# Patient Record
Sex: MALE | Employment: UNEMPLOYED | URBAN - METROPOLITAN AREA
[De-identification: names, ages, dates, MRNs, and addresses within clinical notes are randomized per-mention and may not be internally consistent; named-entity substitution may affect disease eponyms.]

---

## 2017-10-16 ENCOUNTER — TRANSFERRED RECORDS (OUTPATIENT)
Dept: HEALTH INFORMATION MANAGEMENT | Facility: CLINIC | Age: 62
End: 2017-10-16

## 2017-10-23 ENCOUNTER — TELEPHONE (OUTPATIENT)
Dept: ONCOLOGY | Facility: CLINIC | Age: 62
End: 2017-10-23

## 2017-10-23 NOTE — TELEPHONE ENCOUNTER
Called patient regarding his appointment with Dr. Bueno tomorrow. Patient stated that he thought our clinic was in [place]. Patient was given the directions and phone number of our clinic. Patient thought he also had an appointment in [place], he will call clinic if he is going to cancel. Awilda Mendoza RN

## 2017-10-24 ENCOUNTER — ONCOLOGY VISIT (OUTPATIENT)
Dept: ONCOLOGY | Facility: CLINIC | Age: 62
End: 2017-10-24
Attending: INTERNAL MEDICINE
Payer: COMMERCIAL

## 2017-10-24 ENCOUNTER — HOSPITAL ENCOUNTER (OUTPATIENT)
Facility: CLINIC | Age: 62
Setting detail: SPECIMEN
Discharge: HOME | End: 2017-10-24
Attending: INTERNAL MEDICINE | Admitting: INTERNAL MEDICINE
Payer: COMMERCIAL

## 2017-10-24 VITALS
WEIGHT: 229 LBS | OXYGEN SATURATION: 98 % | SYSTOLIC BLOOD PRESSURE: 121 MMHG | HEART RATE: 73 BPM | TEMPERATURE: 98.8 F | DIASTOLIC BLOOD PRESSURE: 78 MMHG

## 2017-10-24 DIAGNOSIS — D50.8 OTHER IRON DEFICIENCY ANEMIA: Primary | ICD-10-CM

## 2017-10-24 PROBLEM — D50.9 ANEMIA, IRON DEFICIENCY: Status: ACTIVE | Noted: 2017-10-24

## 2017-10-24 LAB
ALBUMIN SERPL-MCNC: 3.4 G/DL (ref 3.4–5)
ALP SERPL-CCNC: 88 U/L (ref 40–150)
ALT SERPL W P-5'-P-CCNC: 71 U/L (ref 0–70)
ANION GAP SERPL CALCULATED.3IONS-SCNC: 11 MMOL/L (ref 3–14)
AST SERPL W P-5'-P-CCNC: 240 U/L (ref 0–45)
BASOPHILS # BLD AUTO: 0.1 10E9/L (ref 0–0.2)
BASOPHILS NFR BLD AUTO: 1.3 %
BILIRUB SERPL-MCNC: 0.3 MG/DL (ref 0.2–1.3)
BUN SERPL-MCNC: 6 MG/DL (ref 7–30)
CALCIUM SERPL-MCNC: 8.4 MG/DL (ref 8.5–10.1)
CHLORIDE SERPL-SCNC: 102 MMOL/L (ref 94–109)
CO2 SERPL-SCNC: 22 MMOL/L (ref 20–32)
CREAT SERPL-MCNC: 0.72 MG/DL (ref 0.66–1.25)
DIFFERENTIAL METHOD BLD: ABNORMAL
EOSINOPHIL # BLD AUTO: 0.1 10E9/L (ref 0–0.7)
EOSINOPHIL NFR BLD AUTO: 1.9 %
ERYTHROCYTE [DISTWIDTH] IN BLOOD BY AUTOMATED COUNT: 19.5 % (ref 10–15)
FERRITIN SERPL-MCNC: 56 NG/ML (ref 26–388)
FOLATE SERPL-MCNC: 16.2 NG/ML
GFR SERPL CREATININE-BSD FRML MDRD: >90 ML/MIN/1.7M2
GLUCOSE SERPL-MCNC: 91 MG/DL (ref 70–99)
HCT VFR BLD AUTO: 29.9 % (ref 40–53)
HGB BLD-MCNC: 9.4 G/DL (ref 13.3–17.7)
IMM GRANULOCYTES # BLD: 0 10E9/L (ref 0–0.4)
IMM GRANULOCYTES NFR BLD: 0.3 %
IRON SATN MFR SERPL: 4 % (ref 15–46)
IRON SERPL-MCNC: 15 UG/DL (ref 35–180)
LYMPHOCYTES # BLD AUTO: 2.4 10E9/L (ref 0.8–5.3)
LYMPHOCYTES NFR BLD AUTO: 37.8 %
MCH RBC QN AUTO: 23.4 PG (ref 26.5–33)
MCHC RBC AUTO-ENTMCNC: 31.4 G/DL (ref 31.5–36.5)
MCV RBC AUTO: 74 FL (ref 78–100)
MONOCYTES # BLD AUTO: 0.4 10E9/L (ref 0–1.3)
MONOCYTES NFR BLD AUTO: 6.5 %
NEUTROPHILS # BLD AUTO: 3.3 10E9/L (ref 1.6–8.3)
NEUTROPHILS NFR BLD AUTO: 52.2 %
NRBC # BLD AUTO: 0 10*3/UL
NRBC BLD AUTO-RTO: 0 /100
PLATELET # BLD AUTO: 267 10E9/L (ref 150–450)
POTASSIUM SERPL-SCNC: 4.2 MMOL/L (ref 3.4–5.3)
PROT SERPL-MCNC: 7.9 G/DL (ref 6.8–8.8)
RBC # BLD AUTO: 4.02 10E12/L (ref 4.4–5.9)
RETICS # AUTO: 46.6 10E9/L (ref 25–95)
RETICS/RBC NFR AUTO: 1.2 % (ref 0.5–2)
SODIUM SERPL-SCNC: 135 MMOL/L (ref 133–144)
TIBC SERPL-MCNC: 413 UG/DL (ref 240–430)
VIT B12 SERPL-MCNC: 602 PG/ML (ref 193–986)
WBC # BLD AUTO: 6.4 10E9/L (ref 4–11)

## 2017-10-24 PROCEDURE — 82607 VITAMIN B-12: CPT | Performed by: INTERNAL MEDICINE

## 2017-10-24 PROCEDURE — 84165 PROTEIN E-PHORESIS SERUM: CPT | Performed by: INTERNAL MEDICINE

## 2017-10-24 PROCEDURE — 85060 BLOOD SMEAR INTERPRETATION: CPT | Performed by: INTERNAL MEDICINE

## 2017-10-24 PROCEDURE — 82746 ASSAY OF FOLIC ACID SERUM: CPT | Performed by: INTERNAL MEDICINE

## 2017-10-24 PROCEDURE — 82728 ASSAY OF FERRITIN: CPT | Performed by: INTERNAL MEDICINE

## 2017-10-24 PROCEDURE — 85045 AUTOMATED RETICULOCYTE COUNT: CPT | Performed by: INTERNAL MEDICINE

## 2017-10-24 PROCEDURE — 80053 COMPREHEN METABOLIC PANEL: CPT | Performed by: INTERNAL MEDICINE

## 2017-10-24 PROCEDURE — 36415 COLL VENOUS BLD VENIPUNCTURE: CPT

## 2017-10-24 PROCEDURE — 99211 OFF/OP EST MAY X REQ PHY/QHP: CPT

## 2017-10-24 PROCEDURE — 83540 ASSAY OF IRON: CPT | Performed by: INTERNAL MEDICINE

## 2017-10-24 PROCEDURE — 40000847 ZZHCL STATISTIC MORPHOLOGY W/INTERP HISTOLOGY TC 85060: Performed by: INTERNAL MEDICINE

## 2017-10-24 PROCEDURE — 00000402 ZZHCL STATISTIC TOTAL PROTEIN: Performed by: INTERNAL MEDICINE

## 2017-10-24 PROCEDURE — 99203 OFFICE O/P NEW LOW 30 MIN: CPT | Performed by: INTERNAL MEDICINE

## 2017-10-24 PROCEDURE — 83550 IRON BINDING TEST: CPT | Performed by: INTERNAL MEDICINE

## 2017-10-24 PROCEDURE — 85025 COMPLETE CBC W/AUTO DIFF WBC: CPT | Performed by: INTERNAL MEDICINE

## 2017-10-24 RX ORDER — CITALOPRAM HYDROBROMIDE 20 MG/1
20 TABLET ORAL DAILY
Status: ON HOLD | COMMUNITY
End: 2019-06-28

## 2017-10-24 ASSESSMENT — PAIN SCALES - GENERAL: PAINLEVEL: NO PAIN (0)

## 2017-10-24 NOTE — PATIENT INSTRUCTIONS
-labs today  -schedule colonoscopy at [facility]  will call you. [phone]  -take oral iron pills 325 mg twice a day  -return to clinic in 2 months with labs a few days prior - at the [facility] Scheduled/diana Begum printed & given to patient/diana

## 2017-10-24 NOTE — PROGRESS NOTES
[facility]    Hematology/Oncology New Patient Note      Today's Date: 10/24/17    Reason for Consult: iron-deficiency anemia      HISTORY OF PRESENT ILLNESS: Pramod Harris is a 62 year old male who presents with anemia.  On 10/3/17, hemoglobin was 10.4.  On 10/16/17, hemoglobin was 9.3.  WBC and platelets are normal.  Iron saturation was 6%.  TIBC was 414.  Ferritin level is 21.  He currently denies any bleeding symptoms or dizziness, lightheadedness, or fatigue.  He says that he feels fine.  He denies pain.  He notes history of bleeding ulcer in June 2017, and underwent EGD at the time.  He does not take NSAID's.  He has never had a colonoscopy.  He started taking oral iron a couple of days ago, but only takes it every other day.          REVIEW OF SYSTEMS:   14 point ROS was reviewed and is negative other than as noted above in HPI.       HOME MEDICATIONS:  No current outpatient prescriptions on file.         ALLERGIES:  No Known Allergies      PAST MEDICAL HISTORY:  GERD  Anemia  HLD  Impaired fasting glucose  Osteoarthritis      PAST SURGICAL HISTORY:  Tonsillectomy/adenoidectomy      SOCIAL HISTORY:  Social History     Social History     Marital status:      Spouse name: N/A     Number of children: N/A     Years of education: N/A     Occupational History     Not on file.     Social History Main Topics     Smoking status: Not on file     Smokeless tobacco: Not on file     Alcohol use Not on file     Drug use: Not on file     Sexual activity: Not on file     Other Topics Concern     Not on file     Social History Narrative   He lives in [place], [place].  He is retired.  He has 4 children.  He does not smoke.  He drinks 3-4 times a week, but drinks a 6-pack of beer each time.        FAMILY HISTORY:  He denies family history of cancer.  Mother had seizure disorder.        PHYSICAL EXAM:  Vital signs:  /78 (BP Location: Right arm, Patient Position: Chair, Cuff Size: Adult  Large)  Pulse 73  Temp 98.8  F (37.1  C) (Oral)  Wt 103.9 kg (229 lb)  SpO2 98%   GENERAL/CONSTITUTIONAL: No acute distress. Accompanied by girlfriend.  EYES: No scleral icterus.  RESPIRATORY: Clear to auscultation bilaterally. No crackles or wheezing.   CARDIOVASCULAR: Regular rate and rhythm without murmurs, gallops, or rubs.  GASTROINTESTINAL: No hepatosplenomegaly, masses, or tenderness. The patient has normal bowel sounds. No guarding.  No distention.  MUSCULOSKELETAL: Warm and well-perfused.  NEUROLOGIC: Alert, oriented, answers questions appropriately.      LABS:  Outside labs reviewed.      ASSESSMENT/PLAN:  Pramod Harris is a 62 year old male with:    1) Iron-deficiency anemia: He has history of bleeding ulcer, but currently has no symptoms.  He had an EGD done in June 2017.  He has never had a colonoscopy, so I advised him to have that done.  He will take oral iron.  If he gets intolerable side effects or if not effective, he can try IV iron.  He agrees to try oral iron first.    -he know not to take NSAID's  -labs today: CBC, CMP, vitamin B12, folate, reticulocyte count, ferritin, iron panel, peripheral smear, SPEP  -start oral iron ferrous sulfate 325 mg po BID  -referral for colonoscopy made  -RTC in 2 months with repeat labs - he prefers the [place] location      I spent a total of 30 minutes with the patient, with over >50% of the time in counseling and/or coordination of care.       Mariama Bueno MD  Hematology/Oncology  [facility]

## 2017-10-24 NOTE — MR AVS SNAPSHOT
After Visit Summary   10/24/2017    Pramod Harris    MRN: [id number]           Patient Information     Date Of Birth          1955        Visit Information        Provider Department      10/24/2017 2:30 PM Mariama Bueno MD [facility]        Today's Diagnoses     Other iron deficiency anemia    -  1      Care Instructions    -labs today  -schedule colonoscopy at [facility]  will call you. [phone]  -take oral iron pills 325 mg twice a day  -return to clinic in 2 months with labs a few days prior - at the [facility]          Follow-ups after your visit        Additional Services     GASTROENTEROLOGY ADULT REF PROCEDURE ONLY       Last Lab Result: No results found for: CR  There is no height or weight on file to calculate BMI.     Needed:  No  Language:  English    Patient will be contacted to schedule procedure.     Please be aware that coverage of these services is subject to the terms and limitations of your health insurance plan.  Call member services at your health plan with any benefit or coverage questions.  Any procedures must be performed at a [place] facility OR coordinated by your clinic's referral office.    Please bring the following with you to your appointment:    (1) Any X-Rays, CTs or MRIs which have been performed.  Contact the facility where they were done to arrange for  prior to your scheduled appointment.    (2) List of current medications   (3) This referral request   (4) Any documents/labs given to you for this referral                  Your next 10 appointments already scheduled     Dec 19, 2017  1:30 PM CST   LAB with  [facility]   [facility]    [address] [place] [address], [address]  [facility] [address] [phone]           Patient must bring picture ID. Patient should be prepared to give a urine specimen  Please do not eat 10-12 hours before your  appointment if you are coming in fasting for labs on lipids, cholesterol, or glucose (sugar). Pregnant women should follow their Care Team instructions. Water with medications is okay. Do not drink coffee or other fluids. If you have concerns about taking  your medications, please ask at office or if scheduling via BlazeMeter, send a message by clicking on Secure Messaging, Message Your Care Team.            Dec 21, 2017  2:15 PM CST   Return Visit with Mariama Bueno MD   [facility]    [facility]  [address]  [address]  [facility] [id number] [phone]              Future tests that were ordered for you today     Open Future Orders        Priority Expected Expires Ordered    CBC with platelets differential Routine 12/24/2017 10/24/2018 10/24/2017    Ferritin Routine 12/24/2017 10/24/2018 10/24/2017    Iron and iron binding capacity Routine 12/24/2017 10/24/2018 10/24/2017            Who to contact     If you have questions or need follow up information about today's clinic visit or your schedule please contact [facility] directly at [phone].  Normal or non-critical lab and imaging results will be communicated to you by ReferralCandyhart, letter or phone within 4 business days after the clinic has received the results. If you do not hear from us within 7 days, please contact the clinic through ReferralCandyhart or phone. If you have a critical or abnormal lab result, we will notify you by phone as soon as possible.  Submit refill requests through BlazeMeter or call your pharmacy and they will forward the refill request to us. Please allow 3 business days for your refill to be completed.          Additional Information About Your Visit        BlazeMeter Information     BlazeMeter lets you send messages to your doctor, view your test results, renew your prescriptions, schedule appointments and more. To sign up, go to  [email or web address] . Click on \"Log in\" on the left side of the screen, which will take you to the Welcome page. Then click on \"Sign up Now\" on the right side of the page.     You will be asked to enter the access code listed below, as well as some personal information. Please follow the directions to create your username and password.     Your access code is: [email or web address]  Expires: 2018  3:20 PM     Your access code will  in 90 days. If you need help or a new code, please call your [place] clinic or [phone].        Care EveryWhere ID     This is your Care EveryWhere ID. This could be used by other organizations to access your [facility] medical records  [id number]        Your Vitals Were     Pulse Temperature Pulse Oximetry             73 98.8  F (37.1  C) (Oral) 98%          Blood Pressure from Last 3 Encounters:   10/24/17 121/78    Weight from Last 3 Encounters:   10/24/17 103.9 kg (229 lb)              We Performed the Following     Blood Morphology Pathologist Review     CBC with platelets differential     Comprehensive metabolic panel     Ferritin     Folate     GASTROENTEROLOGY ADULT REF PROCEDURE ONLY     Iron and iron binding capacity     Protein electrophoresis     Reticulocyte count     Vitamin B12        Primary Care Provider Office Phone # Fax #    Pierce Govea [phone]       [facility] [address] [facility] [address]        Equal Access to Services     THANIA MEZA : Hadii maxx coleo Sokerri, waaxda luqadaha, qaybta kaalmada adecedricda, nunu vaca . So [facility] [phone].    ATENCIÓN: Si habla español, tiene a martini disposición servicios gratuitos de asistencia lingüística. Michael al [phone].    We comply with applicable federal civil rights laws and [place] laws. We do not discriminate on the basis of race, color, national origin, age, disability, sex, sexual orientation, or gender " identity.            Thank you!     Thank you for choosing [facility]  for your care. Our goal is always to provide you with excellent care. Hearing back from our patients is one way we can continue to improve our services. Please take a few minutes to complete the written survey that you may receive in the mail after your visit with us. Thank you!             Your Updated Medication List - Protect others around you: Learn how to safely use, store and throw away your medicines at www.disposemymeds.org.          This list is accurate as of: 10/24/17  3:22 PM.  Always use your most recent med list.                   Brand Name Dispense Instructions for use Diagnosis    celeXA 20 MG tablet   Generic drug:  citalopram      Take 20 mg by mouth daily    Other iron deficiency anemia       metFORMIN 500 MG tablet    GLUCOPHAGE     Take 500 mg by mouth 2 times daily (with meals)    Other iron deficiency anemia       OMEPRAZOLE PO       Other iron deficiency anemia

## 2017-10-24 NOTE — LETTER
10/24/2017         RE: Pramod Harris  [address]  [facility][1] [address]        Dear Colleague,    Thank you for referring your patient, Pramod Harris, to the [facility]. Please see a copy of my visit note below.    [facility]    Hematology/Oncology New Patient Note      Today's Date: 10/24/17    Reason for Consult: iron-deficiency anemia      HISTORY OF PRESENT ILLNESS: Pramod Harris is a 62 year old male who presents with anemia.  On 10/3/17, hemoglobin was 10.4.  On 10/16/17, hemoglobin was 9.3.  WBC and platelets are normal.  Iron saturation was 6%.  TIBC was 414.  Ferritin level is 21.  He currently denies any bleeding symptoms or dizziness, lightheadedness, or fatigue.  He says that he feels fine.  He denies pain.  He notes history of bleeding ulcer in June 2017, and underwent EGD at the time.  He does not take NSAID's.  He has never had a colonoscopy.  He started taking oral iron a couple of days ago, but only takes it every other day.          REVIEW OF SYSTEMS:   14 point ROS was reviewed and is negative other than as noted above in HPI.       HOME MEDICATIONS:  No current outpatient prescriptions on file.         ALLERGIES:  No Known Allergies      PAST MEDICAL HISTORY:  GERD  Anemia  HLD  Impaired fasting glucose  Osteoarthritis      PAST SURGICAL HISTORY:  Tonsillectomy/adenoidectomy      SOCIAL HISTORY:  Social History     Social History     Marital status:      Spouse name: N/A     Number of children: N/A     Years of education: N/A     Occupational History     Not on file.     Social History Main Topics     Smoking status: Not on file     Smokeless tobacco: Not on file     Alcohol use Not on file     Drug use: Not on file     Sexual activity: Not on file     Other Topics Concern     Not on file     Social History Narrative   He lives in [place], [place].  He is retired.  He has 4 children.  He does not smoke.  He drinks 3-4 times  a week, but drinks a 6-pack of beer each time.        FAMILY HISTORY:  He denies family history of cancer.  Mother had seizure disorder.        PHYSICAL EXAM:  Vital signs:  /78 (BP Location: Right arm, Patient Position: Chair, Cuff Size: Adult Large)  Pulse 73  Temp 98.8  F (37.1  C) (Oral)  Wt 103.9 kg (229 lb)  SpO2 98%   GENERAL/CONSTITUTIONAL: No acute distress. Accompanied by girlfriend.  EYES: No scleral icterus.  RESPIRATORY: Clear to auscultation bilaterally. No crackles or wheezing.   CARDIOVASCULAR: Regular rate and rhythm without murmurs, gallops, or rubs.  GASTROINTESTINAL: No hepatosplenomegaly, masses, or tenderness. The patient has normal bowel sounds. No guarding.  No distention.  MUSCULOSKELETAL: Warm and well-perfused.  NEUROLOGIC: Alert, oriented, answers questions appropriately.      LABS:  Outside labs reviewed.      ASSESSMENT/PLAN:  Pramod Harris is a 62 year old male with:    1) Iron-deficiency anemia: He has history of bleeding ulcer, but currently has no symptoms.  He had an EGD done in June 2017.  He has never had a colonoscopy, so I advised him to have that done.  He will take oral iron.  If he gets intolerable side effects or if not effective, he can try IV iron.  He agrees to try oral iron first.    -he know not to take NSAID's  -labs today: CBC, CMP, vitamin B12, folate, reticulocyte count, ferritin, iron panel, peripheral smear, SPEP  -start oral iron ferrous sulfate 325 mg po BID  -referral for colonoscopy made  -RTC in 2 months with repeat labs - he prefers the [place] location      I spent a total of 30 minutes with the patient, with over >50% of the time in counseling and/or coordination of care.       Mariama Bueno MD  Hematology/Oncology  [facility]      Oncology Rooming Note    October 24, 2017 2:35 PM   Pramod Harris is a 62 year old male who presents for:    Chief Complaint   Patient presents with     Oncology Clinic  Visit     Initial Vitals: /78 (BP Location: Right arm, Patient Position: Chair, Cuff Size: Adult Large)  Pulse 73  Temp 98.8  F (37.1  C) (Oral)  Wt 103.9 kg (229 lb)  SpO2 98% There is no height or weight on file to calculate BMI. There is no height or weight on file to calculate BSA.  No Pain (0) Comment: Data Unavailable   No LMP for male patient.  Allergies reviewed: Yes  Medications reviewed: Yes    Medications: Medication refills not needed today.  Pharmacy name entered into EPIC: Data Unavailable    Clinical concerns: None     5 minutes for nursing intake (face to face time)     Abida Giron CMA          Medical Assistant Note:  Pramod Guzmankedar Harris presents today for labs.    Patient seen by provider today: Yes: .   present during visit today: Not Applicable.    Concerns: No Concerns.    Procedure:  Lab draw site: RAC, Needle type: BF, Gauge: 21.    Post Assessment:  Labs drawn without difficulty: Yes.    Discharge Plan:  Departure Mode: Ambulatory.    Face to Face Time: 5 minutes.    Abida Giron MA          DISCHARGE PLAN:  Next appointments: See patient instruction section. Pt instructions reviewed with pt. Labs drawn by Abida. [facility]. Pt brought to lobby to schedule lab and follow up at [facility].   Departure Mode: Ambulatory  Accompanied by: self  5 minutes for nursing discharge (face to face time)   Abida Giron CMA                  Again, thank you for allowing me to participate in the care of your patient.        Sincerely,        Mariama Bueno MD

## 2017-10-24 NOTE — PROGRESS NOTES
Oncology Rooming Note    October 24, 2017 2:35 PM   Pramod Harris is a 62 year old male who presents for:    Chief Complaint   Patient presents with     Oncology Clinic Visit     Initial Vitals: /78 (BP Location: Right arm, Patient Position: Chair, Cuff Size: Adult Large)  Pulse 73  Temp 98.8  F (37.1  C) (Oral)  Wt 103.9 kg (229 lb)  SpO2 98% There is no height or weight on file to calculate BMI. There is no height or weight on file to calculate BSA.  No Pain (0) Comment: Data Unavailable   No LMP for male patient.  Allergies reviewed: Yes  Medications reviewed: Yes    Medications: Medication refills not needed today.  Pharmacy name entered into EPIC: Data Unavailable    Clinical concerns: None     5 minutes for nursing intake (face to face time)     Abida Giron CMA          Medical Assistant Note:  Pramod Harris presents today for labs.    Patient seen by provider today: Yes: .   present during visit today: Not Applicable.    Concerns: No Concerns.    Procedure:  Lab draw site: RAC, Needle type: BF, Gauge: 21.    Post Assessment:  Labs drawn without difficulty: Yes.    Discharge Plan:  Departure Mode: Ambulatory.    Face to Face Time: 5 minutes.    Abida Giron MA          DISCHARGE PLAN:  Next appointments: See patient instruction section. Pt instructions reviewed with pt. Labs drawn by Abida. Pt notified that Naveen López will call him to set up colonoscopy. Pt brought to [facility] to schedule lab and follow up at [facility].   Departure Mode: Ambulatory  Accompanied by: self  5 minutes for nursing discharge (face to face time)   Abida Giron CMA

## 2017-10-25 LAB
ALBUMIN SERPL ELPH-MCNC: 4.1 G/DL (ref 3.7–5.1)
ALPHA1 GLOB SERPL ELPH-MCNC: 0.3 G/DL (ref 0.2–0.4)
ALPHA2 GLOB SERPL ELPH-MCNC: 0.7 G/DL (ref 0.5–0.9)
B-GLOBULIN SERPL ELPH-MCNC: 1 G/DL (ref 0.6–1)
COPATH REPORT: NORMAL
GAMMA GLOB SERPL ELPH-MCNC: 1.8 G/DL (ref 0.7–1.6)
M PROTEIN SERPL ELPH-MCNC: 0 G/DL
PROT PATTERN SERPL ELPH-IMP: ABNORMAL

## 2017-11-06 ENCOUNTER — HOSPITAL ENCOUNTER (OUTPATIENT)
Facility: CLINIC | Age: 62
Discharge: HOME | End: 2017-11-06
Attending: INTERNAL MEDICINE | Admitting: INTERNAL MEDICINE
Payer: COMMERCIAL

## 2017-11-06 VITALS
WEIGHT: 229 LBS | SYSTOLIC BLOOD PRESSURE: 121 MMHG | OXYGEN SATURATION: 98 % | HEART RATE: 81 BPM | BODY MASS INDEX: 36.8 KG/M2 | HEIGHT: 66 IN | RESPIRATION RATE: 16 BRPM | DIASTOLIC BLOOD PRESSURE: 89 MMHG

## 2017-11-06 LAB — COLONOSCOPY: NORMAL

## 2017-11-06 PROCEDURE — 88305 TISSUE EXAM BY PATHOLOGIST: CPT | Mod: 26 | Performed by: INTERNAL MEDICINE

## 2017-11-06 PROCEDURE — G0500 MOD SEDAT ENDO SERVICE >5YRS: HCPCS | Performed by: INTERNAL MEDICINE

## 2017-11-06 PROCEDURE — 45385 COLONOSCOPY W/LESION REMOVAL: CPT | Performed by: INTERNAL MEDICINE

## 2017-11-06 PROCEDURE — 25000128 H RX IP 250 OP 636: Performed by: INTERNAL MEDICINE

## 2017-11-06 PROCEDURE — 88305 TISSUE EXAM BY PATHOLOGIST: CPT | Performed by: INTERNAL MEDICINE

## 2017-11-06 RX ORDER — ONDANSETRON 2 MG/ML
4 INJECTION INTRAMUSCULAR; INTRAVENOUS EVERY 6 HOURS PRN
Status: DISCONTINUED | OUTPATIENT
Start: 2017-11-06 | End: 2017-11-06 | Stop reason: HOSPADM

## 2017-11-06 RX ORDER — LIDOCAINE 40 MG/G
CREAM TOPICAL
Status: DISCONTINUED | OUTPATIENT
Start: 2017-11-06 | End: 2017-11-06 | Stop reason: HOSPADM

## 2017-11-06 RX ORDER — ONDANSETRON 4 MG/1
4 TABLET, ORALLY DISINTEGRATING ORAL EVERY 6 HOURS PRN
Status: DISCONTINUED | OUTPATIENT
Start: 2017-11-06 | End: 2017-11-06 | Stop reason: HOSPADM

## 2017-11-06 RX ORDER — FLUMAZENIL 0.1 MG/ML
0.2 INJECTION, SOLUTION INTRAVENOUS
Status: DISCONTINUED | OUTPATIENT
Start: 2017-11-06 | End: 2017-11-06 | Stop reason: HOSPADM

## 2017-11-06 RX ORDER — ONDANSETRON 2 MG/ML
4 INJECTION INTRAMUSCULAR; INTRAVENOUS
Status: DISCONTINUED | OUTPATIENT
Start: 2017-11-06 | End: 2017-11-06 | Stop reason: HOSPADM

## 2017-11-06 RX ORDER — FENTANYL CITRATE 50 UG/ML
INJECTION, SOLUTION INTRAMUSCULAR; INTRAVENOUS PRN
Status: DISCONTINUED | OUTPATIENT
Start: 2017-11-06 | End: 2017-11-06 | Stop reason: HOSPADM

## 2017-11-06 RX ORDER — NALOXONE HYDROCHLORIDE 0.4 MG/ML
.1-.4 INJECTION, SOLUTION INTRAMUSCULAR; INTRAVENOUS; SUBCUTANEOUS
Status: DISCONTINUED | OUTPATIENT
Start: 2017-11-06 | End: 2017-11-06 | Stop reason: HOSPADM

## 2017-11-06 NOTE — H&P
Pre-Endoscopy History and Physical     Pramod Harris MRN# [id number]   YOB: 1955 Age: 62 year old     Date of Procedure: 11/6/2017  Primary care provider: Pierce Govea  Type of Endoscopy: Colonoscopy with possible biopsy, possible polypectomy  Reason for Procedure: anemia  Type of Anesthesia Anticipated: Conscious Sedation    HPI:    Pramod is a 62 year old male who will be undergoing the above procedure.      A history and physical has been performed. The patient's medications and allergies have been reviewed. The risks and benefits of the procedure and the sedation options and risks were discussed with the patient.  All questions were answered and informed consent was obtained.      He denies a personal or family history of anesthesia complications or bleeding disorders.     Patient Active Problem List   Diagnosis     Anemia, iron deficiency        History reviewed. No pertinent past medical history.     Past Surgical History:   Procedure Laterality Date     COLONOSCOPY  11/06/2017    Dr. Mick TAO     ENT SURGERY      T&A as a child     ESOPHAGOSCOPY, GASTROSCOPY, DUODENOSCOPY (EGD), COMBINED  06/2017    [facility]       Social History   Substance Use Topics     Smoking status: Never Smoker     Smokeless tobacco: Never Used     Alcohol use Yes      Comment: 4-5 beers/ 2-3 times per week       Family History   Problem Relation Age of Onset     Colon Cancer No family hx of        Prior to Admission medications    Medication Sig Start Date End Date Taking? Authorizing Provider   Ferrous Sulfate (IRON SUPPLEMENT PO)    Yes Reported, Patient   metFORMIN (GLUCOPHAGE) 500 MG tablet Take 500 mg by mouth 2 times daily (with meals)   Yes Reported, Patient   citalopram (CELEXA) 20 MG tablet Take 20 mg by mouth daily   Yes Reported, Patient   OMEPRAZOLE PO    Yes Reported, Patient       No Known Allergies     REVIEW OF SYSTEMS:   5 point ROS negative except as noted above in HPI, including  "Gen., Resp., CV, GI &  system review.    PHYSICAL EXAM:   Ht 1.676 m (5' 6\")  Wt 103.9 kg (229 lb)  BMI 36.96 kg/m2 Estimated body mass index is 36.96 kg/(m^2) as calculated from the following:    Height as of this encounter: 1.676 m (5' 6\").    Weight as of this encounter: 103.9 kg (229 lb).   GENERAL APPEARANCE: alert, and oriented  MENTAL STATUS: alert  AIRWAY EXAM: Mallampatti Class I (visualization of the soft palate, fauces, uvula, anterior and posterior pillars)  RESP: lungs clear to auscultation - no rales, rhonchi or wheezes  CV: regular rates and rhythm  DIAGNOSTICS:    Not indicated    IMPRESSION   ASA Class 2 - Mild systemic disease    PLAN:   Plan for Colonoscopy with possible biopsy, possible polypectomy. We discussed the risks, benefits and alternatives and the patient wished to proceed.    The above has been forwarded to the consulting provider.      Signed Electronically by: Austyn Barton  November 6, 2017          "

## 2017-11-06 NOTE — DISCHARGE INSTRUCTIONS
Understanding Colon and Rectal Polyps     The colon has a smooth lining composed of millions of cells.     The colon (also called the large intestine) is a muscular tube that forms the last part of the digestive tract. It absorbs water and stores food waste. The colon is about 4 to 6 feet long. The rectum is the last 6 inches of the colon. The colon and rectum have a smooth lining composed of millions of cells. Changes in these cells can lead to growths in the colon that can become cancerous and should be removed.     When the Colon Lining Changes  Changes that occur in the cells that line the colon or rectum can lead to growths called polyps. Over a period of years, polyps can turn cancerous. Removing polyps early may prevent cancer from ever forming.      Polyps  Polyps are fleshy clumps of tissue that form on the lining of the colon or rectum. Small polyps are usually benign (not cancerous). However, over time, cells in a polyp can change and become cancerous. The larger a polyp grows, the more likely this is to happen. Also, certain types of polyps known as adenomatous polyps are considered premalignant. This means that they will almost always become cancerous if they re not removed.          Cancer  Almost all colorectal cancers start when polyp cells begin growing abnormally. As a cancerous tumor grows, it may involve more and more of the colon or rectum. In time, cancer can also grow beyond the colon or rectum and spread to nearby organs or to glands called lymph nodes. The cells can also travel to other parts of the body. This is known as metastasis. The earlier a cancerous tumor is removed, the better the chance of preventing its spread.        0653-3067 [facility], [address] [place] [address], [place] [address]. All rights reserved. This information is not intended as a substitute for professional medical care. Always follow your healthcare professional's instructions.

## 2017-11-06 NOTE — IP AVS SNAPSHOT
MRN:[id number]                      After Visit Summary   11/6/2017    Pramod Harris    MRN: [id number]           Thank you!     Thank you for choosing [facility] for your care. Our goal is always to provide you with excellent care. Hearing back from our patients is one way we can continue to improve our services. Please take a few minutes to complete the written survey that you may receive in the mail after you visit. If you would like to speak to someone directly about your visit please contact Patient Relations at [phone]. Thank you!          Patient Information     Date Of Birth          1955        About your hospital stay     You were admitted on:  November 6, 2017 You last received care in the:  [facility]    You were discharged on:  November 6, 2017       Who to Call     For medical emergencies, please call 911.  For non-urgent questions about your medical care, please call your primary care provider or clinic, [phone]  For questions related to your surgery, please call your surgery clinic        Attending Provider     Provider Specialty    Austyn Barton MD Gastroenterology       Primary Care Provider Office Phone # Fax #    Pierce Govea [phone]      Your next 10 appointments already scheduled     Dec 19, 2017  1:30 PM CST   LAB with  LAB   [facility]    [address] [place] [address], [address]  [facility] [address] [phone]           Please do not eat 10-12 hours before your appointment if you are coming in fasting for labs on lipids, cholesterol, or glucose (sugar). This does not apply to pregnant women. Water, hot tea and black coffee (with nothing added) are okay. Do not drink other fluids, diet soda or chew gum.            Dec 21, 2017  2:15 PM CST   Return Visit with Mariama Bueno MD   [facility] ([facility]  [facility])    [facility]  [address] [place] [address]   [phone]              Further instructions from your care team         Understanding Colon and Rectal Polyps     The colon has a smooth lining composed of millions of cells.     The colon (also called the large intestine) is a muscular tube that forms the last part of the digestive tract. It absorbs water and stores food waste. The colon is about 4 to 6 feet long. The rectum is the last 6 inches of the colon. The colon and rectum have a smooth lining composed of millions of cells. Changes in these cells can lead to growths in the colon that can become cancerous and should be removed.     When the Colon Lining Changes  Changes that occur in the cells that line the colon or rectum can lead to growths called polyps. Over a period of years, polyps can turn cancerous. Removing polyps early may prevent cancer from ever forming.      Polyps  Polyps are fleshy clumps of tissue that form on the lining of the colon or rectum. Small polyps are usually benign (not cancerous). However, over time, cells in a polyp can change and become cancerous. The larger a polyp grows, the more likely this is to happen. Also, certain types of polyps known as adenomatous polyps are considered premalignant. This means that they will almost always become cancerous if they re not removed.          Cancer  Almost all colorectal cancers start when polyp cells begin growing abnormally. As a cancerous tumor grows, it may involve more and more of the colon or rectum. In time, cancer can also grow beyond the colon or rectum and spread to nearby organs or to glands called lymph nodes. The cells can also travel to other parts of the body. This is known as metastasis. The earlier a cancerous tumor is removed, the better the chance of preventing its spread.        4841-8655 Gillian Henderson, [address] [place] [address], [place] [address]. All rights reserved.  "This information is not intended as a substitute for professional medical care. Always follow your healthcare professional's instructions.    Pending Results     No orders found from 2017 to 2017.            Admission Information     Date & Time Provider Department Dept. Phone    2017 Austyn Barton MD [facility] [phone]      Your Vitals Were     Blood Pressure Pulse Respirations Height Weight Pulse Oximetry    115/83 81 15 1.676 m (5' 6\") 103.9 kg (229 lb) 95%    BMI (Body Mass Index)                   36.96 kg/m2           My[employer]enda Information     [employer] lets you send messages to your doctor, view your test results, renew your prescriptions, schedule appointments and more. To sign up, go to [email or web address] . Click on \"Log in\" on the left side of the screen, which will take you to the Welcome page. Then click on \"Sign up Now\" on the right side of the page.     You will be asked to enter the access code listed below, as well as some personal information. Please follow the directions to create your username and password.     Your access code is: [email or web address]  Expires: 2018  2:20 PM     Your access code will  in 90 days. If you need help or a new code, please call your [place] clinic or [phone].        Care EveryWhere ID     This is your Care EveryWhere ID. This could be used by other organizations to access your [place] medical records  [id number]        Equal Access to Services     [facility] : Hadii aad ku hadasho Soomaali, waaxda luqadaha, qaybta kaalmada adeegyada, nunu vaca . So [facility] [phone].    ATENCIÓN: Si habla español, tiene a martini disposición servicios gratuitos de asistencia lingüística. Llame al [phone].    We comply with applicable federal civil rights laws and [place] laws. We do not discriminate on the basis of race, color, national origin, age, disability, sex, sexual orientation, or gender " identity.               Review of your medicines      CONTINUE these medicines which have NOT CHANGED        Dose / Directions    celeXA 20 MG tablet   Used for:  Other iron deficiency anemia   Generic drug:  citalopram        Dose:  20 mg   Take 20 mg by mouth daily   Refills:  0       IRON SUPPLEMENT PO        Refills:  0       metFORMIN 500 MG tablet   Commonly known as:  GLUCOPHAGE   Used for:  Other iron deficiency anemia        Dose:  500 mg   Take 500 mg by mouth 2 times daily (with meals)   Refills:  0       OMEPRAZOLE PO   Used for:  Other iron deficiency anemia        Refills:  0                Protect others around you: Learn how to safely use, store and throw away your medicines at www.disposemymeds.org.             Medication List: This is a list of all your medications and when to take them. Check marks below indicate your daily home schedule. Keep this list as a reference.      Medications           Morning Afternoon Evening Bedtime As Needed    celeXA 20 MG tablet   Take 20 mg by mouth daily   Generic drug:  citalopram                                IRON SUPPLEMENT PO                                metFORMIN 500 MG tablet   Commonly known as:  GLUCOPHAGE   Take 500 mg by mouth 2 times daily (with meals)                                OMEPRAZOLE PO

## 2017-11-06 NOTE — LETTER
October 31, 2017      Pramodrosy Guzmankedar Harris  [address] [facility] [address]        Thank you for choosing [facility]. You are scheduled for the following service.     Date:  11/17/2017 Friday             Procedure:  COLONOSCOPY  Doctor:        Dr. Austyn Barton   Arrival Time:  7:30 AM  *check in at Emergency/Endoscopy desk*  Procedure Time:  8:00 AM    Location:   [facility]        Endoscopy Department, First Floor (Enter through ER Doors) *        [address] [place], [place] [address]      [phone] or [phone] () to reschedule      MIRALAX -GATORADE  PREP  Colonoscopy is the most accurate test to detect colon polyps and colon cancer; and the only test where polyps can be removed. During this procedure, a doctor examines the lining of your large intestine and rectum through a flexible tube.       Transportation  Arrange for a ride for the day of your procedure with a responsible adult.  A taxi ride is not an option unless you are accompanied by a responsible adult. If you fail to arrange transportation with a responsible adult, your procedure will be cancelled and rescheduled.    Purchase the  following supplies at your local pharmacy:  - 2 (two) bisacodyl tablets: each tablet contains 5 mg.  (Dulcolax  laxative NOT Dulcolax  stool softener)   - 1 (one) 8.3 oz bottle of Polyethylene Glycol (PEG) 3350 Powder   (MiraLAX , Smooth LAX , ClearLAX  or equivalent)  - 64 oz Gatorade    Regular Gatorade, Gatorade G2 , Powerade , Powerade Zero  or Pedialyte  is acceptable. Red colored flavors are not allowed; all other colors (yellow, green, orange, purple and blue) are okay. It is also okay to buy two 2.12 oz packets of powdered Gatorade that can be mixed with water to a total volume of 64 oz of liquid.  - 1 (one) 10 oz bottle of Magnesium Citrate (Red colored flavors are not allowed)  It is also okay for you to use a 0.5 oz package of  powdered magnesium citrate (17 g) mixed with 10 oz of water.    PREPARATION FOR COLONOSCOPY    7 days before:    Discontinue fiber supplements and medications containing iron. This includes Metamucil  and Fibercon ; and multivitamins with iron.  3 days before:    Begin a low-fiber diet. A low-fiber diet helps making the cleanout more effective.     Examples of a low-fiber diet include (but are not limited to): white bread, white rice, pasta, crackers, fish, chicken, eggs, ground beef, creamy peanut butter, cooked/steamed/boiled vegetables, canned fruit, bananas, melons, milk, plain yogurt cheese, salad dressing and other condiments.     The following are not allowed on a low-fiber diet: seeds, nuts, popcorn, bran, whole wheat, corn, quinoa, raw fruits and vegetables, berries and dried fruit, beans and lentils.    For additional details on low-fiber diet, please refer to the table on the last page.  2 days before:    Continue the low-fiber diet.     Drink at least 8 glasses of water throughout the day.     Stop eating solid foods at 11:45 pm.  1 day before:    In the morning: begin a clear liquid diet (liquids you can see through).     Examples of a clear liquid diet include: water, clear broth or bouillon, Gatorade, Pedialyte or Powerade, carbonated and non-carbonated soft drinks (Sprite , 7-Up , ginger ale), strained fruit juices without pulp (apple, white grape, white cranberry), Jell-O  and popsicles.     The following are not allowed on a clear liquid diet: red liquids, alcoholic beverages, coffee, dairy products (milk, creamer, and yogurt), protein shakes, creamy broths, juice with pulp and chewing tobacco.    At noon: take 2 (two) bisacodyl tablets     At 4 (and no later than 6pm): start drinking the Miralax-Gatorade preparation (8.3 oz of Miralax mixed with 64 oz of Gatorade in a large pitcher). Drink 1(one) 8 oz glass every 15 minutes thereafter, until the mixture is gone.    COLON CLEANSING TIPS: drink  adequate amounts of fluids before and after your colon cleansing to prevent dehydration. Stay near a toilet because you will have diarrhea. Even if you are sitting on the toilet, continue to drink the cleansing solution every 15 minutes. If you feel nauseous or vomit, rinse your mouth with water, take a 15 to 30-minute-break and then continue drinking the solution. You will be uncomfortable until the stool has flushed from your colon (in about 2 to 4 hours). You may feel chilled.              Day of your procedure  You may take all of your morning medications including blood pressure medications, blood thinners (if you have not been instructed to stop these by our office), methadone, anti-seizure medications with sips of water 3 hours prior to your procedure or earlier. Do not take insulin or vitamins prior to your procedure. Continue the clear liquid diet.   4 hours prior: drink 10 oz of magnesium citrate. It may be easier to drink it with a straw.    STOP consuming all liquids after that.     Do not take anything by mouth during this time.     Allow extra time to travel to your procedure as you may need to stop and use a restroom along the way.  You are ready for the procedure, if you followed all instructions and your stool is no longer formed, but clear or yellow liquid. If you are unsure whether your colon is clean, please call our office at [phone] before you leave for your appointment.  Bring the following to your procedure:  - Insurance Card/Photo ID.   - List of current medications including over-the-counter medications and supplements.   - Your rescue inhaler if you currently use one to control asthma.      Canceling or rescheduling your appointment:   If you must cancel or reschedule your appointment, please call [phone] as soon as possible.      COLONOSCOPY PRE-PROCEDURE CHECKLIST  If you have diabetes, ask your regular doctor for diet and medication restrictions.  If you take an anticoagulant  or anti-platelet medication (such as Coumadin , Lovenox , Pradaxa , Xarelto , Eliquis , etc.), please call your primary doctor for advice on holding this medication.  If you take aspirin you may continue to do so.  If you are or may be pregnant, please discuss the risks and benefits of this procedure with your doctor.          What happens during a colonoscopy?    Plan to spend up to two hours, starting at registration time, at the endoscopy center the day of your procedure. The colonoscopy takes an average of 15 to 30 minutes. Recovery time is about 30 minutes.    Before the exam:    You will change into a gown.    Your medical history and medication list will be reviewed with you, unless that has been done over the phone prior to the procedure.     A nurse will insert an intravenous (IV) line into your hand or arm.    The doctor will meet with you and will give you a consent form to sign.    During the exam:     Medicine will be given through the IV line to help you relax.     Your heart rate and oxygen levels will be monitored. If your blood pressure is low, you may be given fluids through the IV line.     The doctor will insert a flexible hollow tube, called a colonoscope, into your rectum. The scope will be advanced slowly through the large intestine (colon).    You may have a feeling of fullness or pressure.     If an abnormal tissue or a polyp is found, the doctor may remove it through the endoscope for closer examination, or biopsy. Tissue removal is painless    After the exam:           Any tissue samples removed during the exam will be sent to a lab for evaluation. It may take 5-7 working days for you to be notified of the results.     A nurse will provide you with complete discharge instructions before you leave the endoscopy center. Be sure to ask the nurse for specific instructions if you take blood thinners such as Aspirin, Coumadin or Plavix.     The doctor will prepare a full report for you and for  the physician who referred you for the procedure.     Your doctor will talk with you about the initial results of your exam.      Medication given during the exam will prohibit you from driving for the rest of the day.     Following the exam, you may resume your normal diet. Your first meal should be light, no greasy foods. Avoid alcohol until the next day.     You may resume your regular activities the day after the procedure.     LOW-FIBER DIET    Foods RECOMMENDED Foods to AVOID   Breads, Cereal, Rice and Pasta:   White bread, rolls, biscuits, croissant and france toast.   Waffles, Slovenian toast and pancakes.   White rice, noodles, pasta, macaroni and peeled cooked potatoes.   Plain crackers and saltines.   Cooked cereals: farina, cream of rice.   Cold cereals: Puffed Rice , Rice Krispies , Corn Flakes  and Special K    Breads, Cereal, Rice and Pasta:   Breads or rolls with nuts, seeds or fruit.   Whole wheat, pumpernickel, rye breads and cornbread.   Potatoes with skin, brown or wild rice, and kasha (buckwheat).     Vegetables:   Tender cooked and canned vegetables without seeds: carrots, asparagus tips, green or wax beans, pumpkin, spinach, lima beans. Vegetables:   Raw or steamed vegetables.   Vegetables with seeds.   Sauerkraut.   Winter squash, peas, broccoli, Brussel sprouts, cabbage, onions, cauliflower, baked beans, peas and corn.   Fruits:   Strained fruit juice.   Canned fruit, except pineapple.   Ripe bananas and melon. Fruits:   Prunes and prune juice.   Raw fruits.   Dried fruits: figs, dates and raisins.   Milk/Dairy:   Milk: plain or flavored.   Yogurt, custard and ice cream.   Cheese and cottage cheese Milk/Dairy:     Meat and other proteins:   ground, well-cooked tender beef, lamb, ham, veal, pork, fish, poultry and organ meats.   Eggs.   Peanut butter without nuts. Meat and other proteins:   Tough, fibrous meats with gristle.   Dry beans, peas and lentils.   Peanut butter with nuts.   Tofu.    Fats, Snack, Sweets, Condiments and Beverages:   Margarine, butter, oils, mayonnaise, sour cream and salad dressing, plain gravy.   Sugar, hard candy, clear jelly, honey and syrup.   Spices, cooked herbs, bouillon, broth and soups made with allowed vegetable, ketchup and mustard.   Coffee, tea and carbonated drinks.   Plain cakes, cookies and pretzels.   Gelatin, plain puddings, custard, ice cream, sherbet and popsicles. Fats, Snack, Sweets, Condiments and Beverages:   Nuts, seeds and coconut.   Jam, marmalade and preserves.   Pickles, olives, relish and horseradish.   All desserts containing nuts, seeds, dried fruit and coconut; or made from whole grains or bran.   Candy made with nuts or seeds.   Popcorn.                     DIRECTIONS TO THE ENDOSCOPY DEPARTMENT     From the north ([facility])  Take [address] South, exit on [address] [address]. Get into the left hand pema, turn left (east), go one-half mile to [address] and turn left. Go north to the first stoplight, take a right on [address] and follow it to the Emergency entrance.    From the south ([facility])  Take [address]N to the [address] split and exit on [address] [address]. On [address] [address], turn left (west) to [address]. Turn right (north) on [address]. Go north to the first stoplight, take a right on [address] and follow it to the Emergency entrance.    From the east via 35E ([facility])  Take [address] south to [address] [address] exit. Turn right on [address] . Go west to [address]. Turn right (north) on [address]. Go to the first stoplight, take a right and follow on [address] to the Emergency entrance.    From the east via [address] ([facility])  Take [address] to [address]. Turn left (south) on [address] to [address]. Turn left (east) on [address] and follow it to the Emergency entrance.    From the west via [address] ([place], [place])  Take [address] east  to [address]. Turn right (south) on [address] to [address]. Turn left (east) on [address] and follow it to the Emergency entrance.

## 2017-11-06 NOTE — IP AVS SNAPSHOT
[facility]    [address]    [facility] [address]    Phone:  [phone]    Fax:  [phone]                                       After Visit Summary   11/6/2017    Pramod Harris    MRN: [id number]           After Visit Summary Signature Page     I have received my discharge instructions, and my questions have been answered. I have discussed any challenges I see with this plan with the nurse or doctor.    ..........................................................................................................................................  Patient/Patient Representative Signature      ..........................................................................................................................................  Patient Representative Print Name and Relationship to Patient    ..................................................               ................................................  Date                                            Time    ..........................................................................................................................................  Reviewed by Signature/Title    ...................................................              ..............................................  Date                                                            Time

## 2017-11-07 LAB — COPATH REPORT: NORMAL

## 2017-12-12 DIAGNOSIS — D50.8 OTHER IRON DEFICIENCY ANEMIA: ICD-10-CM

## 2017-12-12 LAB
ANISOCYTOSIS BLD QL SMEAR: SLIGHT
BASOPHILS # BLD AUTO: 0.1 10E9/L (ref 0–0.2)
BASOPHILS NFR BLD AUTO: 1 %
DIFFERENTIAL METHOD BLD: ABNORMAL
EOSINOPHIL # BLD AUTO: 0.2 10E9/L (ref 0–0.7)
EOSINOPHIL NFR BLD AUTO: 4 %
ERYTHROCYTE [DISTWIDTH] IN BLOOD BY AUTOMATED COUNT: 24.2 % (ref 10–15)
FERRITIN SERPL-MCNC: 73 NG/ML (ref 26–388)
HCT VFR BLD AUTO: 39.5 % (ref 40–53)
HGB BLD-MCNC: 12.9 G/DL (ref 13.3–17.7)
IRON SATN MFR SERPL: 73 % (ref 15–46)
IRON SERPL-MCNC: 247 UG/DL (ref 35–180)
LYMPHOCYTES # BLD AUTO: 3.1 10E9/L (ref 0.8–5.3)
LYMPHOCYTES NFR BLD AUTO: 55 %
MCH RBC QN AUTO: 26.7 PG (ref 26.5–33)
MCHC RBC AUTO-ENTMCNC: 32.7 G/DL (ref 31.5–36.5)
MCV RBC AUTO: 82 FL (ref 78–100)
MONOCYTES # BLD AUTO: 0.4 10E9/L (ref 0–1.3)
MONOCYTES NFR BLD AUTO: 8 %
NEUTROPHILS # BLD AUTO: 1.8 10E9/L (ref 1.6–8.3)
NEUTROPHILS NFR BLD AUTO: 32 %
PLATELET # BLD AUTO: 266 10E9/L (ref 150–450)
RBC # BLD AUTO: 4.83 10E12/L (ref 4.4–5.9)
TIBC SERPL-MCNC: 339 UG/DL (ref 240–430)
WBC # BLD AUTO: 5.6 10E9/L (ref 4–11)

## 2017-12-12 PROCEDURE — 85025 COMPLETE CBC W/AUTO DIFF WBC: CPT | Performed by: INTERNAL MEDICINE

## 2017-12-12 PROCEDURE — 83540 ASSAY OF IRON: CPT | Performed by: INTERNAL MEDICINE

## 2017-12-12 PROCEDURE — 36415 COLL VENOUS BLD VENIPUNCTURE: CPT | Performed by: INTERNAL MEDICINE

## 2017-12-12 PROCEDURE — 83550 IRON BINDING TEST: CPT | Performed by: INTERNAL MEDICINE

## 2017-12-12 PROCEDURE — 82728 ASSAY OF FERRITIN: CPT | Performed by: INTERNAL MEDICINE

## 2017-12-21 ENCOUNTER — ONCOLOGY VISIT (OUTPATIENT)
Dept: ONCOLOGY | Facility: CLINIC | Age: 62
End: 2017-12-21
Attending: INTERNAL MEDICINE
Payer: COMMERCIAL

## 2017-12-21 VITALS
RESPIRATION RATE: 16 BRPM | HEIGHT: 66 IN | TEMPERATURE: 98.4 F | BODY MASS INDEX: 35.55 KG/M2 | OXYGEN SATURATION: 94 % | DIASTOLIC BLOOD PRESSURE: 85 MMHG | WEIGHT: 221.2 LBS | SYSTOLIC BLOOD PRESSURE: 138 MMHG | HEART RATE: 85 BPM

## 2017-12-21 DIAGNOSIS — D50.8 OTHER IRON DEFICIENCY ANEMIA: Primary | ICD-10-CM

## 2017-12-21 PROCEDURE — 99211 OFF/OP EST MAY X REQ PHY/QHP: CPT

## 2017-12-21 PROCEDURE — 99213 OFFICE O/P EST LOW 20 MIN: CPT | Performed by: INTERNAL MEDICINE

## 2017-12-21 NOTE — PROGRESS NOTES
[facility]    Hematology/Oncology Established Patient Note      Today's Date: 12/21/17    Reason for Follow-up: iron-deficiency anemia      HISTORY OF PRESENT ILLNESS: Pramod Harris is a 62 year old male who presents with anemia.  On 10/3/17, hemoglobin was 10.4.  On 10/16/17, hemoglobin was 9.3.  WBC and platelets are normal.  Iron saturation was 6%.  TIBC was 414.  Ferritin level is 21.  He currently denies any bleeding symptoms or dizziness, lightheadedness, or fatigue.  He says that he feels fine.  He denies pain.  He notes history of bleeding ulcer in June 2017, and underwent EGD at the time.  He does not take NSAID's.      INTERIM HISTORY: Pramod comes in for follow-up today.  He says that he feels well, better than the last visit.  He takes oral iron at least once a day now.  Sometimes he takes it twice a day if he remembers, but often forgets to take the second dose.  He says that it gives him constipation sometimes.  He denies bleeding symptoms.        REVIEW OF SYSTEMS:   14 point ROS was reviewed and is negative other than as noted above in HPI.       HOME MEDICATIONS:  Current Outpatient Prescriptions   Medication Sig Dispense Refill     Ferrous Sulfate (IRON SUPPLEMENT PO)        metFORMIN (GLUCOPHAGE) 500 MG tablet Take 500 mg by mouth 2 times daily (with meals)       citalopram (CELEXA) 20 MG tablet Take 20 mg by mouth daily       OMEPRAZOLE PO            ALLERGIES:  No Known Allergies      PAST MEDICAL HISTORY:  GERD  Anemia  HLD  Impaired fasting glucose  Osteoarthritis      PAST SURGICAL HISTORY:  Tonsillectomy/adenoidectomy      SOCIAL HISTORY:  Social History     Social History     Marital status:      Spouse name: N/A     Number of children: N/A     Years of education: N/A     Occupational History     Not on file.     Social History Main Topics     Smoking status: Never Smoker     Smokeless tobacco: Never Used     Alcohol use Yes      Comment: 4-5 beers/  "2-3 times per week     Drug use: No     Sexual activity: Not on file     Other Topics Concern     Not on file     Social History Narrative   He lives in [place], [place].  He is retired.  He has 4 children.  He does not smoke.  He drinks 3-4 times a week, but drinks a 6-pack of beer each time.        FAMILY HISTORY:  He denies family history of cancer.  Mother had seizure disorder.        PHYSICAL EXAM:  Vital signs:  /85  Pulse 85  Temp 98.4  F (36.9  C) (Tympanic)  Resp 16  Ht 1.676 m (5' 6\")  Wt 100.3 kg (221 lb 3.2 oz)  SpO2 94%  BMI 35.7 kg/m2   GENERAL/CONSTITUTIONAL: No acute distress. Accompanied by girlfriend.  EYES: No scleral icterus.  MUSCULOSKELETAL: Warm and well-perfused.  NEUROLOGIC: Alert, oriented, answers questions appropriately.      LABS:  CBC RESULTS:   Recent Labs   Lab Test  12/12/17   1230   WBC  5.6   RBC  4.83   HGB  12.9*   HCT  39.5*   MCV  82   MCH  26.7   MCHC  32.7   RDW  24.2*   PLT  266     Component      Latest Ref Rng & Units 10/24/2017 10/24/2017 10/24/2017 10/24/2017           3:07 PM  3:07 PM  3:07 PM  3:07 PM   Iron      35 - 180 ug/dL  15 (L)     Iron Binding Cap      240 - 430 ug/dL  413     Iron Saturation Index      15 - 46 %  4 (L)     % Retic      0.5 - 2.0 %       Absolute Retic      25 - 95 10e9/L       Ferritin      26 - 388 ng/mL 56      Folate      >5.4 ng/mL    16.2   Vitamin B12      193 - 986 pg/mL   602      Component      Latest Ref Rng & Units 12/12/2017 12/12/2017          12:30 PM 12:30 PM   Iron      35 - 180 ug/dL  247 (H)   Iron Binding Cap      240 - 430 ug/dL  339   Iron Saturation Index      15 - 46 %  73 (H)   % Retic      0.5 - 2.0 %     Absolute Retic      25 - 95 10e9/L     Ferritin      26 - 388 ng/mL 73    Folate      >5.4 ng/mL     Vitamin B12      193 - 986 pg/mL           ASSESSMENT/PLAN:  Pramod Harris is a 62 year old male with:    1) Iron-deficiency anemia: He has history of bleeding ulcer, but currently has no symptoms.  " He had an EGD done in June 2017.  He is now taking oral iron more consistently, at least once a day.  He gets constipation sometimes if he takes it more often.    Labs reviewed with the patient.  His hemoglobin is improved to 12.9 and ferritin has improved as well.     -he know not to take NSAID's  -continue taking oral iron 325 mg po once a day.  He seems to tolerate once a day.  He can try stool softeners if he gets constipation.  If he cannot tolerate oral iron, can try IV iron if he is still iron deficient.  However, patient prefers to continue oral iron.    -he had a colonoscopy on 11/6/17, which found a polyp in the sigmoid colon; pathology showed a tubular adenoma.  Next colonoscopy was recommended to be in 5 years from then.  -RTC in 4 months with repeat labs       I spent a total of 15 minutes with the patient, with over >50% of the time in counseling and/or coordination of care.       Mariama Bueno MD  Hematology/Oncology  [facility]

## 2017-12-21 NOTE — NURSING NOTE
"Oncology Rooming Note    December 21, 2017 2:31 PM   Pramod Harris is a 62 year old male who presents for:    Chief Complaint   Patient presents with     Oncology Clinic Visit     Follow up     Initial Vitals: /85  Pulse 85  Temp 98.4  F (36.9  C) (Tympanic)  Resp 16  Ht 1.676 m (5' 6\")  Wt 100.3 kg (221 lb 3.2 oz)  SpO2 94%  BMI 35.7 kg/m2 Estimated body mass index is 35.7 kg/(m^2) as calculated from the following:    Height as of this encounter: 1.676 m (5' 6\").    Weight as of this encounter: 100.3 kg (221 lb 3.2 oz). Body surface area is 2.16 meters squared.  Data Unavailable Comment: Data Unavailable   No LMP for male patient.  Allergies reviewed: Yes  Medications reviewed: Yes    Medications: Medication refills not needed today.  Pharmacy name entered into [email or web address]: [id number] - [place], [place] - [address]  [address]    Clinical concerns: Follow up  8 minutes for nursing intake (face to face time)     Makayla Cornell CMA      DISCHARGE PLAN:  Next appointments: See patient instruction section  Departure Mode: Ambulatory  Accompanied by: wife  0 minutes for nursing discharge (face to face time)   Makayla Cornell CMA                "

## 2017-12-21 NOTE — LETTER
12/21/2017         RE: Pramod Harris  [address] [facility] [address]        Dear Colleague,    Thank you for referring your patient, Pramod Harris, to the [facility]. Please see a copy of my visit note below.    [facility]    Hematology/Oncology Established Patient Note      Today's Date: 12/21/17    Reason for Follow-up: iron-deficiency anemia      HISTORY OF PRESENT ILLNESS: Pramod Harris is a 62 year old male who presents with anemia.  On 10/3/17, hemoglobin was 10.4.  On 10/16/17, hemoglobin was 9.3.  WBC and platelets are normal.  Iron saturation was 6%.  TIBC was 414.  Ferritin level is 21.  He currently denies any bleeding symptoms or dizziness, lightheadedness, or fatigue.  He says that he feels fine.  He denies pain.  He notes history of bleeding ulcer in June 2017, and underwent EGD at the time.  He does not take NSAID's.      INTERIM HISTORY: Pramod comes in for follow-up today.  He says that he feels well, better than the last visit.  He takes oral iron at least once a day now.  Sometimes he takes it twice a day if he remembers, but often forgets to take the second dose.  He says that it gives him constipation sometimes.  He denies bleeding symptoms.        REVIEW OF SYSTEMS:   14 point ROS was reviewed and is negative other than as noted above in HPI.       HOME MEDICATIONS:  Current Outpatient Prescriptions   Medication Sig Dispense Refill     Ferrous Sulfate (IRON SUPPLEMENT PO)        metFORMIN (GLUCOPHAGE) 500 MG tablet Take 500 mg by mouth 2 times daily (with meals)       citalopram (CELEXA) 20 MG tablet Take 20 mg by mouth daily       OMEPRAZOLE PO            ALLERGIES:  No Known Allergies      PAST MEDICAL HISTORY:  GERD  Anemia  HLD  Impaired fasting glucose  Osteoarthritis      PAST SURGICAL HISTORY:  Tonsillectomy/adenoidectomy      SOCIAL HISTORY:  Social History     Social History     Marital status:   "    Spouse name: N/A     Number of children: N/A     Years of education: N/A     Occupational History     Not on file.     Social History Main Topics     Smoking status: Never Smoker     Smokeless tobacco: Never Used     Alcohol use Yes      Comment: 4-5 beers/ 2-3 times per week     Drug use: No     Sexual activity: Not on file     Other Topics Concern     Not on file     Social History Narrative   He lives in [place], [place].  He is retired.  He has 4 children.  He does not smoke.  He drinks 3-4 times a week, but drinks a 6-pack of beer each time.        FAMILY HISTORY:  He denies family history of cancer.  Mother had seizure disorder.        PHYSICAL EXAM:  Vital signs:  /85  Pulse 85  Temp 98.4  F (36.9  C) (Tympanic)  Resp 16  Ht 1.676 m (5' 6\")  Wt 100.3 kg (221 lb 3.2 oz)  SpO2 94%  BMI 35.7 kg/m2   GENERAL/CONSTITUTIONAL: No acute distress. Accompanied by girlfriend.  EYES: No scleral icterus.  MUSCULOSKELETAL: Warm and well-perfused.  NEUROLOGIC: Alert, oriented, answers questions appropriately.      LABS:  CBC RESULTS:   Recent Labs   Lab Test  12/12/17   1230   WBC  5.6   RBC  4.83   HGB  12.9*   HCT  39.5*   MCV  82   MCH  26.7   MCHC  32.7   RDW  24.2*   PLT  266     Component      Latest Ref Rng & Units 10/24/2017 10/24/2017 10/24/2017 10/24/2017           3:07 PM  3:07 PM  3:07 PM  3:07 PM   Iron      35 - 180 ug/dL  15 (L)     Iron Binding Cap      240 - 430 ug/dL  413     Iron Saturation Index      15 - 46 %  4 (L)     % Retic      0.5 - 2.0 %       Absolute Retic      25 - 95 10e9/L       Ferritin      26 - 388 ng/mL 56      Folate      >5.4 ng/mL    16.2   Vitamin B12      193 - 986 pg/mL   602      Component      Latest Ref Rng & Units 12/12/2017 12/12/2017          12:30 PM 12:30 PM   Iron      35 - 180 ug/dL  247 (H)   Iron Binding Cap      240 - 430 ug/dL  339   Iron Saturation Index      15 - 46 %  73 (H)   % Retic      0.5 - 2.0 %     Absolute Retic      25 - 95 10e9/L   "   Ferritin      26 - 388 ng/mL 73    Folate      >5.4 ng/mL     Vitamin B12      193 - 986 pg/mL           ASSESSMENT/PLAN:  Pramod Harris is a 62 year old male with:    1) Iron-deficiency anemia: He has history of bleeding ulcer, but currently has no symptoms.  He had an EGD done in June 2017.  He is now taking oral iron more consistently, at least once a day.  He gets constipation sometimes if he takes it more often.    Labs reviewed with the patient.  His hemoglobin is improved to 12.9 and ferritin has improved as well.     -he know not to take NSAID's  -continue taking oral iron 325 mg po once a day.  He seems to tolerate once a day.  He can try stool softeners if he gets constipation.  If he cannot tolerate oral iron, can try IV iron if he is still iron deficient.  However, patient prefers to continue oral iron.    -he had a colonoscopy on 11/6/17, which found a polyp in the sigmoid colon; pathology showed a tubular adenoma.  Next colonoscopy was recommended to be in 5 years from then.  -RTC in 4 months with repeat labs       I spent a total of 15 minutes with the patient, with over >50% of the time in counseling and/or coordination of care.       Mariama Bueno MD  Hematology/Oncology  [facility]      Again, thank you for allowing me to participate in the care of your patient.        Sincerely,        Mariama Bueno MD

## 2017-12-21 NOTE — MR AVS SNAPSHOT
After Visit Summary   12/21/2017    Pramod Harris    MRN: [id number]           Patient Information     Date Of Birth          1955        Visit Information        Provider Department      12/21/2017 2:15 PM Mariama Bueno MD [facility]        Today's Diagnoses     Other iron deficiency anemia    -  1      Care Instructions          -return to clinic in 4 months with labs a few days prior (okay to do labs at your home [facility] in [place]) Scheduled  Adri SORIANO given to patient            Follow-ups after your visit        Your next 10 appointments already scheduled     Apr 09, 2018 11:00 AM CDT   LAB with FM LAB   [facility]    [address] [place] [address], [address]  [facility] [address] [phone]           Please do not eat 10-12 hours before your appointment if you are coming in fasting for labs on lipids, cholesterol, or glucose (sugar). This does not apply to pregnant women. Water, hot tea and black coffee (with nothing added) are okay. Do not drink other fluids, diet soda or chew gum.            Apr 12, 2018  3:15 PM CDT   Return Visit with Mariama Bueno MD   [facility] ([facility])    [facility]  [address]  [address] [id number] [phone]              Future tests that were ordered for you today     Open Future Orders        Priority Expected Expires Ordered    CBC with platelets differential Routine 4/21/2018 12/21/2018 12/21/2017    Iron and iron binding capacity Routine 4/21/2018 12/21/2018 12/21/2017    Ferritin Routine 4/21/2018 12/21/2018 12/21/2017            Who to contact     If you have questions or need follow up information about today's clinic visit or your schedule please contact [facility] directly at [phone].  Normal or non-critical lab and imaging  "results will be communicated to you by MyChart, letter or phone within 4 business days after the clinic has received the results. If you do not hear from us within 7 days, please contact the clinic through [employer] or phone. If you have a critical or abnormal lab result, we will notify you by phone as soon as possible.  Submit refill requests through [facility] or call your pharmacy and they will forward the refill request to us. Please allow 3 business days for your refill to be completed.          Additional Information About Your Visit        [facility] Information     [facility] lets you send messages to your doctor, view your test results, renew your prescriptions, schedule appointments and more. To sign up, go to [email or web address] . Click on \"Log in\" on the left side of the screen, which will take you to the Welcome page. Then click on \"Sign up Now\" on the right side of the page.     You will be asked to enter the access code listed below, as well as some personal information. Please follow the directions to create your username and password.     Your access code is: [email or web address]  Expires: 2018  2:20 PM     Your access code will  in 90 days. If you need help or a new code, please call your [facility] clinic or [phone].        Care EveryWhere ID     This is your Care EveryWhere ID. This could be used by other organizations to access your [facility] medical records  [id number]        Your Vitals Were     Pulse Temperature Respirations Height Pulse Oximetry BMI (Body Mass Index)    85 98.4  F (36.9  C) (Tympanic) 16 1.676 m (5' 6\") 94% 35.7 kg/m2       Blood Pressure from Last 3 Encounters:   17 138/85   17 121/89   10/24/17 121/78    Weight from Last 3 Encounters:   17 100.3 kg (221 lb 3.2 oz)   17 103.9 kg (229 lb)   10/24/17 103.9 kg (229 lb)               Primary Care Provider Office Phone # Fax #    Pierec Govea [phone]       [facility] " [address] [facility] [address]        Equal Access to Services     OCHIEN SUSANA : Hadii aad ku hadmargaritatania Nevarez, ghazalada nirmala, qakasiefallon jeongsandovalmars neal, nunu griffinlynettechelly rose. So [facility] [phone].    ATENCIÓN: Si habla español, tiene a martini disposición servicios gratuitos de asistencia lingüística. Llame al [phone].    We comply with applicable federal civil rights laws and [place] laws. We do not discriminate on the basis of race, color, national origin, age, disability, sex, sexual orientation, or gender identity.            Thank you!     Thank you for choosing [facility]  for your care. Our goal is always to provide you with excellent care. Hearing back from our patients is one way we can continue to improve our services. Please take a few minutes to complete the written survey that you may receive in the mail after your visit with us. Thank you!             Your Updated Medication List - Protect others around you: Learn how to safely use, store and throw away your medicines at www.disposemymeds.org.          This list is accurate as of: 12/21/17  2:57 PM.  Always use your most recent med list.                   Brand Name Dispense Instructions for use Diagnosis    celeXA 20 MG tablet   Generic drug:  citalopram      Take 20 mg by mouth daily    Other iron deficiency anemia       IRON SUPPLEMENT PO           metFORMIN 500 MG tablet    GLUCOPHAGE     Take 500 mg by mouth 2 times daily (with meals)    Other iron deficiency anemia       OMEPRAZOLE PO       Other iron deficiency anemia

## 2017-12-21 NOTE — PATIENT INSTRUCTIONS
-return to clinic in 4 months with labs a few days prior (okay to do labs at your home [facility] in [place]) Scheduled  Adri SORIANO given to patient

## 2019-06-27 ENCOUNTER — APPOINTMENT (OUTPATIENT)
Dept: GENERAL RADIOLOGY | Facility: CLINIC | Age: 64
DRG: 087 | End: 2019-06-27
Attending: EMERGENCY MEDICINE
Payer: MEDICARE

## 2019-06-27 ENCOUNTER — APPOINTMENT (OUTPATIENT)
Dept: CT IMAGING | Facility: CLINIC | Age: 64
End: 2019-06-27
Attending: EMERGENCY MEDICINE
Payer: MEDICARE

## 2019-06-27 ENCOUNTER — HOSPITAL ENCOUNTER (INPATIENT)
Facility: CLINIC | Age: 64
LOS: 1 days | Discharge: HOME | DRG: 087 | End: 2019-06-28
Attending: EMERGENCY MEDICINE | Admitting: SURGERY
Payer: MEDICARE

## 2019-06-27 ENCOUNTER — HOSPITAL ENCOUNTER (EMERGENCY)
Facility: CLINIC | Age: 64
Discharge: ANOTHER HOSPITAL | End: 2019-06-27
Attending: EMERGENCY MEDICINE | Admitting: EMERGENCY MEDICINE
Payer: MEDICARE

## 2019-06-27 VITALS
OXYGEN SATURATION: 98 % | HEART RATE: 134 BPM | DIASTOLIC BLOOD PRESSURE: 97 MMHG | TEMPERATURE: 99.1 F | SYSTOLIC BLOOD PRESSURE: 132 MMHG | RESPIRATION RATE: 16 BRPM

## 2019-06-27 DIAGNOSIS — S01.81XA FACIAL LACERATION, INITIAL ENCOUNTER: ICD-10-CM

## 2019-06-27 DIAGNOSIS — S06.5XAA SUBDURAL HEMATOMA, ACUTE (H): ICD-10-CM

## 2019-06-27 DIAGNOSIS — F10.929 ALCOHOLIC INTOXICATION WITH COMPLICATION (H): ICD-10-CM

## 2019-06-27 DIAGNOSIS — S06.5XAA SUBDURAL HEMATOMA (H): ICD-10-CM

## 2019-06-27 LAB
ANION GAP SERPL CALCULATED.3IONS-SCNC: 9 MMOL/L (ref 3–14)
APTT PPP: 30 SEC (ref 22–37)
BASOPHILS # BLD AUTO: 0.1 10E9/L (ref 0–0.2)
BASOPHILS NFR BLD AUTO: 1.4 %
BUN SERPL-MCNC: 3 MG/DL (ref 7–30)
CALCIUM SERPL-MCNC: 8.1 MG/DL (ref 8.5–10.1)
CHLORIDE SERPL-SCNC: 100 MMOL/L (ref 94–109)
CO2 SERPL-SCNC: 24 MMOL/L (ref 20–32)
CREAT SERPL-MCNC: 0.62 MG/DL (ref 0.66–1.25)
DIFFERENTIAL METHOD BLD: ABNORMAL
EOSINOPHIL # BLD AUTO: 0 10E9/L (ref 0–0.7)
EOSINOPHIL NFR BLD AUTO: 0.2 %
ERYTHROCYTE [DISTWIDTH] IN BLOOD BY AUTOMATED COUNT: 13.1 % (ref 10–15)
ETHANOL SERPL-MCNC: 0.31 G/DL
GFR SERPL CREATININE-BSD FRML MDRD: >90 ML/MIN/{1.73_M2}
GLUCOSE SERPL-MCNC: 130 MG/DL (ref 70–99)
HCT VFR BLD AUTO: 40.1 % (ref 40–53)
HGB BLD-MCNC: 13.9 G/DL (ref 13.3–17.7)
IMM GRANULOCYTES # BLD: 0 10E9/L (ref 0–0.4)
IMM GRANULOCYTES NFR BLD: 0.2 %
INR PPP: 1.15 (ref 0.86–1.14)
LYMPHOCYTES # BLD AUTO: 1.3 10E9/L (ref 0.8–5.3)
LYMPHOCYTES NFR BLD AUTO: 30 %
MCH RBC QN AUTO: 34.2 PG (ref 26.5–33)
MCHC RBC AUTO-ENTMCNC: 34.7 G/DL (ref 31.5–36.5)
MCV RBC AUTO: 99 FL (ref 78–100)
MONOCYTES # BLD AUTO: 0.3 10E9/L (ref 0–1.3)
MONOCYTES NFR BLD AUTO: 7.1 %
NEUTROPHILS # BLD AUTO: 2.6 10E9/L (ref 1.6–8.3)
NEUTROPHILS NFR BLD AUTO: 61.1 %
NRBC # BLD AUTO: 0 10*3/UL
NRBC BLD AUTO-RTO: 0 /100
PLATELET # BLD AUTO: 173 10E9/L (ref 150–450)
POTASSIUM SERPL-SCNC: 3.9 MMOL/L (ref 3.4–5.3)
RADIOLOGIST FLAGS: ABNORMAL
RBC # BLD AUTO: 4.06 10E12/L (ref 4.4–5.9)
SODIUM SERPL-SCNC: 133 MMOL/L (ref 133–144)
WBC # BLD AUTO: 4.2 10E9/L (ref 4–11)

## 2019-06-27 PROCEDURE — 80320 DRUG SCREEN QUANTALCOHOLS: CPT | Performed by: EMERGENCY MEDICINE

## 2019-06-27 PROCEDURE — 99285 EMERGENCY DEPT VISIT HI MDM: CPT | Mod: 25 | Performed by: EMERGENCY MEDICINE

## 2019-06-27 PROCEDURE — 70486 CT MAXILLOFACIAL W/O DYE: CPT

## 2019-06-27 PROCEDURE — 85018 HEMOGLOBIN: CPT | Performed by: EMERGENCY MEDICINE

## 2019-06-27 PROCEDURE — 99285 EMERGENCY DEPT VISIT HI MDM: CPT | Mod: 25

## 2019-06-27 PROCEDURE — 72125 CT NECK SPINE W/O DYE: CPT

## 2019-06-27 PROCEDURE — HZ2ZZZZ DETOXIFICATION SERVICES FOR SUBSTANCE ABUSE TREATMENT: ICD-10-PCS | Performed by: NEUROLOGICAL SURGERY

## 2019-06-27 PROCEDURE — 85025 COMPLETE CBC W/AUTO DIFF WBC: CPT | Performed by: EMERGENCY MEDICINE

## 2019-06-27 PROCEDURE — 70450 CT HEAD/BRAIN W/O DYE: CPT

## 2019-06-27 PROCEDURE — 85610 PROTHROMBIN TIME: CPT | Performed by: EMERGENCY MEDICINE

## 2019-06-27 PROCEDURE — 71045 X-RAY EXAM CHEST 1 VIEW: CPT

## 2019-06-27 PROCEDURE — 12000001 ZZH R&B MED SURG/OB UMMC

## 2019-06-27 PROCEDURE — 68200001 ZZH PARTIAL TRAUMA W/O CC LEVEL II

## 2019-06-27 PROCEDURE — 80048 BASIC METABOLIC PNL TOTAL CA: CPT | Performed by: EMERGENCY MEDICINE

## 2019-06-27 PROCEDURE — 85730 THROMBOPLASTIN TIME PARTIAL: CPT | Performed by: EMERGENCY MEDICINE

## 2019-06-27 PROCEDURE — 76705 ECHO EXAM OF ABDOMEN: CPT | Mod: 26 | Performed by: EMERGENCY MEDICINE

## 2019-06-27 PROCEDURE — 76705 ECHO EXAM OF ABDOMEN: CPT | Performed by: EMERGENCY MEDICINE

## 2019-06-27 RX ORDER — OXYCODONE HYDROCHLORIDE 5 MG/1
5-10 TABLET ORAL
Status: DISCONTINUED | OUTPATIENT
Start: 2019-06-27 | End: 2019-06-28

## 2019-06-27 RX ORDER — HYDROMORPHONE HCL/0.9% NACL/PF 0.2MG/0.2
0.2 SYRINGE (ML) INTRAVENOUS
Status: DISCONTINUED | OUTPATIENT
Start: 2019-06-27 | End: 2019-06-28

## 2019-06-27 RX ORDER — ONDANSETRON 2 MG/ML
4 INJECTION INTRAMUSCULAR; INTRAVENOUS EVERY 6 HOURS PRN
Status: DISCONTINUED | OUTPATIENT
Start: 2019-06-27 | End: 2019-06-28

## 2019-06-27 RX ORDER — ONDANSETRON 4 MG/1
4 TABLET, ORALLY DISINTEGRATING ORAL EVERY 6 HOURS PRN
Status: DISCONTINUED | OUTPATIENT
Start: 2019-06-27 | End: 2019-06-28

## 2019-06-27 RX ORDER — NALOXONE HYDROCHLORIDE 0.4 MG/ML
.1-.4 INJECTION, SOLUTION INTRAMUSCULAR; INTRAVENOUS; SUBCUTANEOUS
Status: DISCONTINUED | OUTPATIENT
Start: 2019-06-27 | End: 2019-06-28

## 2019-06-27 RX ORDER — LEVETIRACETAM 100 MG/ML
2000 SOLUTION ORAL ONCE
Status: DISCONTINUED | OUTPATIENT
Start: 2019-06-27 | End: 2019-06-27

## 2019-06-27 RX ORDER — SODIUM CHLORIDE, SODIUM LACTATE, POTASSIUM CHLORIDE, CALCIUM CHLORIDE 600; 310; 30; 20 MG/100ML; MG/100ML; MG/100ML; MG/100ML
1000 INJECTION, SOLUTION INTRAVENOUS CONTINUOUS
Status: DISCONTINUED | OUTPATIENT
Start: 2019-06-28 | End: 2019-06-28

## 2019-06-27 ASSESSMENT — ENCOUNTER SYMPTOMS
WOUND: 1
FACIAL SWELLING: 1

## 2019-06-28 ENCOUNTER — APPOINTMENT (OUTPATIENT)
Dept: CT IMAGING | Facility: CLINIC | Age: 64
DRG: 087 | End: 2019-06-28
Attending: STUDENT IN AN ORGANIZED HEALTH CARE EDUCATION/TRAINING PROGRAM
Payer: MEDICARE

## 2019-06-28 VITALS
HEIGHT: 66 IN | BODY MASS INDEX: 32.17 KG/M2 | OXYGEN SATURATION: 98 % | DIASTOLIC BLOOD PRESSURE: 84 MMHG | WEIGHT: 200.2 LBS | RESPIRATION RATE: 16 BRPM | SYSTOLIC BLOOD PRESSURE: 142 MMHG | TEMPERATURE: 98 F | HEART RATE: 92 BPM

## 2019-06-28 LAB
ETHANOL SERPL-MCNC: 0.25 G/DL
GLUCOSE BLDC GLUCOMTR-MCNC: 107 MG/DL (ref 70–99)
HGB BLD-MCNC: 13.5 G/DL (ref 13.3–17.7)

## 2019-06-28 PROCEDURE — 25000132 ZZH RX MED GY IP 250 OP 250 PS 637: Mod: GY | Performed by: NURSE PRACTITIONER

## 2019-06-28 PROCEDURE — 25800030 ZZH RX IP 258 OP 636: Performed by: EMERGENCY MEDICINE

## 2019-06-28 PROCEDURE — 25000132 ZZH RX MED GY IP 250 OP 250 PS 637: Mod: GY | Performed by: STUDENT IN AN ORGANIZED HEALTH CARE EDUCATION/TRAINING PROGRAM

## 2019-06-28 PROCEDURE — 25800030 ZZH RX IP 258 OP 636: Performed by: STUDENT IN AN ORGANIZED HEALTH CARE EDUCATION/TRAINING PROGRAM

## 2019-06-28 PROCEDURE — 12000001 ZZH R&B MED SURG/OB UMMC

## 2019-06-28 PROCEDURE — 96365 THER/PROPH/DIAG IV INF INIT: CPT | Performed by: EMERGENCY MEDICINE

## 2019-06-28 PROCEDURE — 25000128 H RX IP 250 OP 636: Performed by: EMERGENCY MEDICINE

## 2019-06-28 PROCEDURE — 00000146 ZZHCL STATISTIC GLUCOSE BY METER IP

## 2019-06-28 PROCEDURE — 70450 CT HEAD/BRAIN W/O DYE: CPT

## 2019-06-28 RX ORDER — LEVETIRACETAM 500 MG/1
500 TABLET ORAL 2 TIMES DAILY
Qty: 14 TABLET | Refills: 0 | Status: SHIPPED | OUTPATIENT
Start: 2019-06-28 | End: 2019-08-01

## 2019-06-28 RX ORDER — LANOLIN ALCOHOL/MO/W.PET/CERES
100 CREAM (GRAM) TOPICAL DAILY
Status: DISCONTINUED | OUTPATIENT
Start: 2019-06-28 | End: 2019-06-28 | Stop reason: HOSPADM

## 2019-06-28 RX ORDER — DEXTROSE MONOHYDRATE 25 G/50ML
25-50 INJECTION, SOLUTION INTRAVENOUS
Status: DISCONTINUED | OUTPATIENT
Start: 2019-06-28 | End: 2019-06-28 | Stop reason: HOSPADM

## 2019-06-28 RX ORDER — ESCITALOPRAM OXALATE 10 MG/1
10-20 TABLET ORAL DAILY
Refills: 1 | COMMUNITY
Start: 2019-06-11

## 2019-06-28 RX ORDER — FOLIC ACID 1 MG/1
1 TABLET ORAL DAILY
Status: DISCONTINUED | OUTPATIENT
Start: 2019-06-28 | End: 2019-06-28 | Stop reason: HOSPADM

## 2019-06-28 RX ORDER — HYDRALAZINE HYDROCHLORIDE 20 MG/ML
10 INJECTION INTRAMUSCULAR; INTRAVENOUS EVERY 4 HOURS PRN
Status: DISCONTINUED | OUTPATIENT
Start: 2019-06-28 | End: 2019-06-28 | Stop reason: HOSPADM

## 2019-06-28 RX ORDER — ACETAMINOPHEN 325 MG/1
650 TABLET ORAL EVERY 4 HOURS PRN
Status: DISCONTINUED | OUTPATIENT
Start: 2019-06-28 | End: 2019-06-28 | Stop reason: HOSPADM

## 2019-06-28 RX ORDER — MULTIPLE VITAMINS W/ MINERALS TAB 9MG-400MCG
1 TAB ORAL DAILY
Status: DISCONTINUED | OUTPATIENT
Start: 2019-06-28 | End: 2019-06-28 | Stop reason: HOSPADM

## 2019-06-28 RX ORDER — LABETALOL 20 MG/4 ML (5 MG/ML) INTRAVENOUS SYRINGE
10-20 EVERY 4 HOURS PRN
Status: DISCONTINUED | OUTPATIENT
Start: 2019-06-28 | End: 2019-06-28 | Stop reason: HOSPADM

## 2019-06-28 RX ORDER — LEVETIRACETAM 500 MG/1
500 TABLET ORAL 2 TIMES DAILY
Status: DISCONTINUED | OUTPATIENT
Start: 2019-06-28 | End: 2019-06-28 | Stop reason: HOSPADM

## 2019-06-28 RX ORDER — ONDANSETRON 2 MG/ML
4-8 INJECTION INTRAMUSCULAR; INTRAVENOUS EVERY 6 HOURS PRN
Status: DISCONTINUED | OUTPATIENT
Start: 2019-06-28 | End: 2019-06-28 | Stop reason: HOSPADM

## 2019-06-28 RX ORDER — NICOTINE POLACRILEX 4 MG
15-30 LOZENGE BUCCAL
Status: DISCONTINUED | OUTPATIENT
Start: 2019-06-28 | End: 2019-06-28 | Stop reason: HOSPADM

## 2019-06-28 RX ADMIN — FOLIC ACID 1 MG: 1 TABLET ORAL at 08:16

## 2019-06-28 RX ADMIN — Medication 100 MG: at 08:17

## 2019-06-28 RX ADMIN — SODIUM CHLORIDE 2000 MG: 9 INJECTION, SOLUTION INTRAVENOUS at 00:41

## 2019-06-28 RX ADMIN — MULTIPLE VITAMINS W/ MINERALS TAB 1 TABLET: TAB at 08:17

## 2019-06-28 RX ADMIN — SODIUM CHLORIDE, POTASSIUM CHLORIDE, SODIUM LACTATE AND CALCIUM CHLORIDE 1000 ML: 600; 310; 30; 20 INJECTION, SOLUTION INTRAVENOUS at 00:07

## 2019-06-28 RX ADMIN — LEVETIRACETAM 500 MG: 500 TABLET ORAL at 08:17

## 2019-06-28 ASSESSMENT — ACTIVITIES OF DAILY LIVING (ADL)
BATHING: 0-->INDEPENDENT
TOILETING: 0-->INDEPENDENT
DRESS: 0-->INDEPENDENT
TRANSFERRING: 0-->INDEPENDENT
RETIRED_EATING: 0-->INDEPENDENT
SWALLOWING: 0-->SWALLOWS FOODS/LIQUIDS WITHOUT DIFFICULTY
FALL_HISTORY_WITHIN_LAST_SIX_MONTHS: NO
RETIRED_COMMUNICATION: 0-->UNDERSTANDS/COMMUNICATES WITHOUT DIFFICULTY
ADLS_ACUITY_SCORE: 12
AMBULATION: 0-->INDEPENDENT
ADLS_ACUITY_SCORE: 12
COGNITION: 0 - NO COGNITION ISSUES REPORTED

## 2019-06-28 ASSESSMENT — MIFFLIN-ST. JEOR: SCORE: 1645.85
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
## 2019-06-28 NOTE — PROGRESS NOTES
"Consult [id number]    Full note to follow     Recommendations:   1. Repeat head ct in 6 hours, 03:30 6/28/19 (ordered for you)   2. keppra bid  3. q2h neuro checks with floor admission  4. Admission to trauma service  5. ciwa protocol without benzodiazepine orders   6. normonatremia  7. Folate and thiamine  8. Cervical spine cleared  9. No neurosurgical intervention at this time  10. Npo at midnight until stability demonstrated on repeat scan   11. SBP < 140   12. Hematologic goals: Platelets > 100,000, INR < 1.5  SCDs while in bed; no chemoprophylaxis for DVT     Marinelli \"Garuav\" MD [place]   Neurosurgery, PGY-2    Please contact neurosurgery resident on call with questions.    Dial * * *[phone], enter [phone] when prompted.     "
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
## 2019-06-28 NOTE — ED NOTES
Bed: ED04  Expected date: 6/27/19  Expected time: 7:37 PM  Means of arrival: Ambulance  Comments:  Jhonathan Champagne
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
## 2019-06-28 NOTE — PLAN OF CARE
Status: Pt admitted to 6A d/t subdural hematoma multiple cranial fractures to right side of face.  Vitals: VSS on RA, Continuous on pulse ox.   Neuros: Intact. 5/5 strengths, PERRLA. Denies N/V, HA. CIWAA 2  IV: PIV SL  Resp/trach: LS clear, RA  Diet: NPO   Bowel status: Pt reported one BM this shift   : Voiding without difficulty   Skin: Intact, swelling and bruising to right side of face and eye. Right eye red.   Pain: Denies pain  Activity: SBA; seizure precautions   Social: significant other at bedside.   Plan: CT completed at 0330 Continue to monitor and follow POC
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
## 2019-07-26 DIAGNOSIS — S06.5XAA SUBDURAL HEMATOMA (H): Primary | ICD-10-CM

## 2019-08-01 ENCOUNTER — ANCILLARY PROCEDURE (OUTPATIENT)
Dept: CT IMAGING | Facility: CLINIC | Age: 64
End: 2019-08-01
Attending: NURSE PRACTITIONER
Payer: MEDICARE

## 2019-08-01 ENCOUNTER — OFFICE VISIT (OUTPATIENT)
Dept: NEUROSURGERY | Facility: CLINIC | Age: 64
End: 2019-08-01
Payer: MEDICARE

## 2019-08-01 ENCOUNTER — TELEPHONE (OUTPATIENT)
Dept: NEUROSURGERY | Facility: CLINIC | Age: 64
End: 2019-08-01

## 2019-08-01 VITALS
TEMPERATURE: 98 F | HEIGHT: 66 IN | HEART RATE: 86 BPM | OXYGEN SATURATION: 96 % | RESPIRATION RATE: 15 BRPM | SYSTOLIC BLOOD PRESSURE: 126 MMHG | DIASTOLIC BLOOD PRESSURE: 87 MMHG | WEIGHT: 201 LBS | BODY MASS INDEX: 32.3 KG/M2

## 2019-08-01 DIAGNOSIS — S06.5XAA SUBDURAL HEMATOMA (H): ICD-10-CM

## 2019-08-01 DIAGNOSIS — S06.9X0D TRAUMATIC BRAIN INJURY, WITHOUT LOSS OF CONSCIOUSNESS, SUBSEQUENT ENCOUNTER: ICD-10-CM

## 2019-08-01 PROBLEM — I60.9 SAH (SUBARACHNOID HEMORRHAGE) (H): Status: ACTIVE | Noted: 2019-05-04

## 2019-08-01 LAB — RADIOLOGIST FLAGS: ABNORMAL

## 2019-08-01 ASSESSMENT — MIFFLIN-ST. JEOR: SCORE: 1649.48

## 2019-08-01 ASSESSMENT — PAIN SCALES - GENERAL: PAINLEVEL: NO PAIN (0)

## 2019-08-01 NOTE — PATIENT INSTRUCTIONS
Follow up in clinic with a head CT prior in 2-3 weeks.    Call [phone] for concerns or questions.

## 2019-08-01 NOTE — TELEPHONE ENCOUNTER
[facility] calling with abnormal CT Head without contrast results from 8/1/19.  Patient seen by provider post imaging and told of phone call.

## 2019-08-01 NOTE — NURSING NOTE
Chief Complaint   Patient presents with     RECHECK     [facility] RETURN 2 WK F/U CT PRIOR        Johnathon Harry, EMT
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
## 2019-08-16 ENCOUNTER — OFFICE VISIT (OUTPATIENT)
Dept: NEUROSURGERY | Facility: CLINIC | Age: 64
End: 2019-08-16
Payer: MEDICARE

## 2019-08-16 ENCOUNTER — ANCILLARY PROCEDURE (OUTPATIENT)
Dept: CT IMAGING | Facility: CLINIC | Age: 64
End: 2019-08-16
Attending: NEUROLOGICAL SURGERY
Payer: MEDICARE

## 2019-08-16 VITALS
SYSTOLIC BLOOD PRESSURE: 142 MMHG | DIASTOLIC BLOOD PRESSURE: 96 MMHG | WEIGHT: 201 LBS | BODY MASS INDEX: 32.44 KG/M2 | OXYGEN SATURATION: 95 % | HEART RATE: 94 BPM

## 2019-08-16 DIAGNOSIS — S06.5XAA SUBDURAL HEMATOMA (H): ICD-10-CM

## 2019-08-16 DIAGNOSIS — S06.9X0D TRAUMATIC BRAIN INJURY, WITHOUT LOSS OF CONSCIOUSNESS, SUBSEQUENT ENCOUNTER: ICD-10-CM

## 2019-08-16 ASSESSMENT — PAIN SCALES - GENERAL: PAINLEVEL: NO PAIN (0)
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
## 2019-08-16 NOTE — NURSING NOTE
Chief Complaint   Patient presents with     RECHECK     [facility] RETURN 2-3 WK FOLLOW UP WITH CT       Nikole Quach, EMT

## 2019-08-16 NOTE — PATIENT INSTRUCTIONS
Call [phone] or go to the ED if you develop unusual headache, seizures, memory change, vision change, balance disturbance and/or any other symptoms that raise your concerns.    Follow up with Neurosurgery as needed.
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
## 2019-08-29 ENCOUNTER — TELEPHONE (OUTPATIENT)
Dept: NEUROSURGERY | Facility: CLINIC | Age: 64
End: 2019-08-29

## 2019-08-29 NOTE — TELEPHONE ENCOUNTER
Phone call to patient to relay information as below.  Patint greetful for call.  Patient verbalized understanding/agreement with current plan.     ----- Message from Ivory Zabala RN sent at 8/29/2019  9:11 AM CDT -----  Regarding: FW: No further f/u is needed for his subdural and subarachonid hematoma  FYI    ----- Message -----    Ivory,    I saw him this am. Please let him know that I reviewed his head CT today with Dr. Grigsby. His head CT shows slight improvement overall.  We will see him on PRN basis.    Thanks,  ALANNAH Daily

## 2020-01-01 ENCOUNTER — APPOINTMENT (OUTPATIENT)
Dept: GENERAL RADIOLOGY | Facility: CLINIC | Age: 65
End: 2020-01-01
Attending: EMERGENCY MEDICINE
Payer: MEDICARE

## 2020-01-01 ENCOUNTER — HOSPITAL ENCOUNTER (EMERGENCY)
Facility: CLINIC | Age: 65
Discharge: HOME | End: 2020-07-27
Attending: EMERGENCY MEDICINE | Admitting: EMERGENCY MEDICINE
Payer: MEDICARE

## 2020-01-01 VITALS
RESPIRATION RATE: 18 BRPM | DIASTOLIC BLOOD PRESSURE: 79 MMHG | OXYGEN SATURATION: 100 % | HEART RATE: 76 BPM | SYSTOLIC BLOOD PRESSURE: 114 MMHG | TEMPERATURE: 98.2 F

## 2020-01-01 DIAGNOSIS — S50.812A ABRASION OF LEFT FOREARM, INITIAL ENCOUNTER: ICD-10-CM

## 2020-01-01 DIAGNOSIS — S42.202A CLOSED FRACTURE OF PROXIMAL END OF LEFT HUMERUS, UNSPECIFIED FRACTURE MORPHOLOGY, INITIAL ENCOUNTER: ICD-10-CM

## 2020-01-01 PROCEDURE — 73060 X-RAY EXAM OF HUMERUS: CPT | Mod: LT

## 2020-01-01 PROCEDURE — 25000132 ZZH RX MED GY IP 250 OP 250 PS 637: Mod: GY | Performed by: EMERGENCY MEDICINE

## 2020-01-01 PROCEDURE — 29105 APPLICATION LONG ARM SPLINT: CPT | Mod: LT

## 2020-01-01 PROCEDURE — 73030 X-RAY EXAM OF SHOULDER: CPT | Mod: LT

## 2020-01-01 PROCEDURE — 99284 EMERGENCY DEPT VISIT MOD MDM: CPT | Mod: 25

## 2020-01-01 RX ORDER — ACETAMINOPHEN 500 MG
1000 TABLET ORAL ONCE
Status: COMPLETED | OUTPATIENT
Start: 2020-01-01 | End: 2020-01-01

## 2020-01-01 RX ORDER — OXYCODONE HYDROCHLORIDE 5 MG/1
10 TABLET ORAL ONCE
Status: COMPLETED | OUTPATIENT
Start: 2020-01-01 | End: 2020-01-01

## 2020-01-01 RX ORDER — SENNA AND DOCUSATE SODIUM 50; 8.6 MG/1; MG/1
1-2 TABLET, FILM COATED ORAL 2 TIMES DAILY PRN
Qty: 30 TABLET | Refills: 0 | Status: SHIPPED | OUTPATIENT
Start: 2020-01-01 | End: 2020-01-01

## 2020-01-01 RX ORDER — OXYCODONE HYDROCHLORIDE 5 MG/1
5-10 TABLET ORAL EVERY 6 HOURS PRN
Qty: 20 TABLET | Refills: 0 | Status: SHIPPED | OUTPATIENT
Start: 2020-01-01 | End: 2021-01-01

## 2020-01-01 RX ADMIN — ACETAMINOPHEN 1000 MG: 500 TABLET, FILM COATED ORAL at 14:19

## 2020-01-01 RX ADMIN — OXYCODONE HYDROCHLORIDE 10 MG: 5 TABLET ORAL at 14:19

## 2020-01-01 ASSESSMENT — ENCOUNTER SYMPTOMS
HEADACHES: 0
ARTHRALGIAS: 1
SHORTNESS OF BREATH: 0
BACK PAIN: 0

## 2020-07-27 NOTE — ED AVS SNAPSHOT
[facility]  [address]  [facility] [address]  Phone:  [phone]  Fax:  [phone]                                    Pramod Harris   MRN: [id number]    Department:  [facility]   Date of Visit:  7/27/2020           After Visit Summary Signature Page    I have received my discharge instructions, and my questions have been answered. I have discussed any challenges I see with this plan with the nurse or doctor.    ..........................................................................................................................................  Patient/Patient Representative Signature      ..........................................................................................................................................  Patient Representative Print Name and Relationship to Patient    ..................................................               ................................................  Date                                   Time    ..........................................................................................................................................  Reviewed by Signature/Title    ...................................................              ..............................................  Date                                               Time          22EPIC Rev 08/18

## 2020-07-27 NOTE — DISCHARGE INSTRUCTIONS
1. -Take acetaminophen 500 to 1000 mg by mouth every 4 to 6 hours as needed for pain or fever.  Do not take more than 4000 mg in 24 hours.  Do not take within 6 hours of another acetaminophen containing medication such as norco (vicodin) or percocet.  - Take ibuprofen 600 to 800 mg by mouth every 6 to 8 hours as needed for pain or fever  2. Use ice as needed for swelling.  3. Follow-up with [facility].  4. You may return to the ED as needed for new or worsening symptoms such as reinjury, severe and uncontrollable pain, focal weakness, severe numbness and tingling, any other concerning symptoms.

## 2021-01-01 ENCOUNTER — APPOINTMENT (OUTPATIENT)
Dept: OCCUPATIONAL THERAPY | Facility: CLINIC | Age: 66
DRG: 432 | End: 2021-01-01
Payer: MEDICARE

## 2021-01-01 ENCOUNTER — APPOINTMENT (OUTPATIENT)
Dept: ULTRASOUND IMAGING | Facility: CLINIC | Age: 66
DRG: 432 | End: 2021-01-01
Attending: EMERGENCY MEDICINE
Payer: MEDICARE

## 2021-01-01 ENCOUNTER — APPOINTMENT (OUTPATIENT)
Dept: CT IMAGING | Facility: CLINIC | Age: 66
DRG: 432 | End: 2021-01-01
Attending: EMERGENCY MEDICINE
Payer: MEDICARE

## 2021-01-01 ENCOUNTER — APPOINTMENT (OUTPATIENT)
Dept: SPEECH THERAPY | Facility: CLINIC | Age: 66
DRG: 432 | End: 2021-01-01
Payer: MEDICARE

## 2021-01-01 ENCOUNTER — APPOINTMENT (OUTPATIENT)
Dept: GENERAL RADIOLOGY | Facility: CLINIC | Age: 66
DRG: 432 | End: 2021-01-01
Attending: INTERNAL MEDICINE
Payer: MEDICARE

## 2021-01-01 ENCOUNTER — APPOINTMENT (OUTPATIENT)
Dept: OCCUPATIONAL THERAPY | Facility: CLINIC | Age: 66
DRG: 432 | End: 2021-01-01
Attending: INTERNAL MEDICINE
Payer: MEDICARE

## 2021-01-01 ENCOUNTER — APPOINTMENT (OUTPATIENT)
Dept: ULTRASOUND IMAGING | Facility: CLINIC | Age: 66
DRG: 432 | End: 2021-01-01
Attending: INTERNAL MEDICINE
Payer: MEDICARE

## 2021-01-01 ENCOUNTER — APPOINTMENT (OUTPATIENT)
Dept: PHYSICAL THERAPY | Facility: CLINIC | Age: 66
DRG: 432 | End: 2021-01-01
Payer: MEDICARE

## 2021-01-01 ENCOUNTER — APPOINTMENT (OUTPATIENT)
Dept: INTERPRETER SERVICES | Facility: CLINIC | Age: 66
End: 2021-01-01
Payer: MEDICARE

## 2021-01-01 ENCOUNTER — HOSPITAL ENCOUNTER (INPATIENT)
Facility: CLINIC | Age: 66
LOS: 18 days | DRG: 432 | End: 2021-01-21
Attending: EMERGENCY MEDICINE | Admitting: HOSPITALIST
Payer: MEDICARE

## 2021-01-01 ENCOUNTER — APPOINTMENT (OUTPATIENT)
Dept: PHYSICAL THERAPY | Facility: CLINIC | Age: 66
DRG: 432 | End: 2021-01-01
Attending: INTERNAL MEDICINE
Payer: MEDICARE

## 2021-01-01 ENCOUNTER — APPOINTMENT (OUTPATIENT)
Dept: CT IMAGING | Facility: CLINIC | Age: 66
DRG: 432 | End: 2021-01-01
Attending: INTERNAL MEDICINE
Payer: MEDICARE

## 2021-01-01 ENCOUNTER — APPOINTMENT (OUTPATIENT)
Dept: ULTRASOUND IMAGING | Facility: CLINIC | Age: 66
DRG: 432 | End: 2021-01-01
Attending: HOSPITALIST
Payer: MEDICARE

## 2021-01-01 ENCOUNTER — APPOINTMENT (OUTPATIENT)
Dept: SPEECH THERAPY | Facility: CLINIC | Age: 66
DRG: 432 | End: 2021-01-01
Attending: INTERNAL MEDICINE
Payer: MEDICARE

## 2021-01-01 VITALS
HEIGHT: 66 IN | SYSTOLIC BLOOD PRESSURE: 118 MMHG | RESPIRATION RATE: 23 BRPM | HEART RATE: 88 BPM | OXYGEN SATURATION: 93 % | WEIGHT: 220.7 LBS | TEMPERATURE: 96.6 F | BODY MASS INDEX: 35.47 KG/M2 | DIASTOLIC BLOOD PRESSURE: 48 MMHG

## 2021-01-01 DIAGNOSIS — D64.9 ANEMIA, UNSPECIFIED TYPE: ICD-10-CM

## 2021-01-01 DIAGNOSIS — E80.6 HYPERBILIRUBINEMIA: ICD-10-CM

## 2021-01-01 DIAGNOSIS — E87.1 HYPONATREMIA: ICD-10-CM

## 2021-01-01 DIAGNOSIS — M62.81 GENERALIZED MUSCLE WEAKNESS: ICD-10-CM

## 2021-01-01 DIAGNOSIS — N28.9 RENAL INSUFFICIENCY: ICD-10-CM

## 2021-01-01 DIAGNOSIS — F10.10 ALCOHOL ABUSE: ICD-10-CM

## 2021-01-01 LAB
ABO + RH BLD: NORMAL
ABO + RH BLD: NORMAL
ALBUMIN FLD-MCNC: 0.2 G/DL
ALBUMIN SERPL-MCNC: 1.2 G/DL (ref 3.4–5)
ALBUMIN SERPL-MCNC: 1.4 G/DL (ref 3.4–5)
ALBUMIN SERPL-MCNC: 1.5 G/DL (ref 3.4–5)
ALBUMIN SERPL-MCNC: 1.5 G/DL (ref 3.4–5)
ALBUMIN SERPL-MCNC: 1.6 G/DL (ref 3.4–5)
ALBUMIN SERPL-MCNC: 2.5 G/DL (ref 3.4–5)
ALBUMIN SERPL-MCNC: 2.5 G/DL (ref 3.4–5)
ALBUMIN SERPL-MCNC: 2.6 G/DL (ref 3.4–5)
ALBUMIN SERPL-MCNC: 2.8 G/DL (ref 3.4–5)
ALBUMIN SERPL-MCNC: 3 G/DL (ref 3.4–5)
ALBUMIN SERPL-MCNC: 3.1 G/DL (ref 3.4–5)
ALBUMIN SERPL-MCNC: 3.2 G/DL (ref 3.4–5)
ALBUMIN SERPL-MCNC: 3.3 G/DL (ref 3.4–5)
ALBUMIN SERPL-MCNC: 3.4 G/DL (ref 3.4–5)
ALBUMIN SERPL-MCNC: 3.6 G/DL (ref 3.4–5)
ALBUMIN UR-MCNC: 10 MG/DL
ALBUMIN UR-MCNC: 10 MG/DL
ALBUMIN UR-MCNC: 20 MG/DL
ALP SERPL-CCNC: 101 U/L (ref 40–150)
ALP SERPL-CCNC: 109 U/L (ref 40–150)
ALP SERPL-CCNC: 112 U/L (ref 40–150)
ALP SERPL-CCNC: 113 U/L (ref 40–150)
ALP SERPL-CCNC: 116 U/L (ref 40–150)
ALP SERPL-CCNC: 121 U/L (ref 40–150)
ALP SERPL-CCNC: 123 U/L (ref 40–150)
ALP SERPL-CCNC: 151 U/L (ref 40–150)
ALP SERPL-CCNC: 161 U/L (ref 40–150)
ALP SERPL-CCNC: 168 U/L (ref 40–150)
ALP SERPL-CCNC: 170 U/L (ref 40–150)
ALP SERPL-CCNC: 174 U/L (ref 40–150)
ALP SERPL-CCNC: 180 U/L (ref 40–150)
ALP SERPL-CCNC: 183 U/L (ref 40–150)
ALP SERPL-CCNC: 88 U/L (ref 40–150)
ALP SERPL-CCNC: 91 U/L (ref 40–150)
ALP SERPL-CCNC: 98 U/L (ref 40–150)
ALT SERPL W P-5'-P-CCNC: 38 U/L (ref 0–70)
ALT SERPL W P-5'-P-CCNC: 42 U/L (ref 0–70)
ALT SERPL W P-5'-P-CCNC: 43 U/L (ref 0–70)
ALT SERPL W P-5'-P-CCNC: 48 U/L (ref 0–70)
ALT SERPL W P-5'-P-CCNC: 53 U/L (ref 0–70)
ALT SERPL W P-5'-P-CCNC: 54 U/L (ref 0–70)
ALT SERPL W P-5'-P-CCNC: 55 U/L (ref 0–70)
ALT SERPL W P-5'-P-CCNC: 56 U/L (ref 0–70)
ALT SERPL W P-5'-P-CCNC: 59 U/L (ref 0–70)
ALT SERPL W P-5'-P-CCNC: 60 U/L (ref 0–70)
ALT SERPL W P-5'-P-CCNC: 69 U/L (ref 0–70)
ALT SERPL W P-5'-P-CCNC: 71 U/L (ref 0–70)
ALT SERPL W P-5'-P-CCNC: 72 U/L (ref 0–70)
ALT SERPL W P-5'-P-CCNC: 78 U/L (ref 0–70)
ALT SERPL W P-5'-P-CCNC: 80 U/L (ref 0–70)
ALT SERPL W P-5'-P-CCNC: 81 U/L (ref 0–70)
ALT SERPL W P-5'-P-CCNC: 83 U/L (ref 0–70)
AMMONIA PLAS-SCNC: 25 UMOL/L (ref 10–50)
AMMONIA PLAS-SCNC: 43 UMOL/L (ref 10–50)
AMMONIA PLAS-SCNC: 48 UMOL/L (ref 10–50)
AMMONIA PLAS-SCNC: 62 UMOL/L (ref 10–50)
AMMONIA PLAS-SCNC: 82 UMOL/L (ref 10–50)
AMORPH CRY #/AREA URNS HPF: ABNORMAL /HPF
ANION GAP SERPL CALCULATED.3IONS-SCNC: 10 MMOL/L (ref 3–14)
ANION GAP SERPL CALCULATED.3IONS-SCNC: 14 MMOL/L (ref 3–14)
ANION GAP SERPL CALCULATED.3IONS-SCNC: 6 MMOL/L (ref 3–14)
ANION GAP SERPL CALCULATED.3IONS-SCNC: 7 MMOL/L (ref 3–14)
ANION GAP SERPL CALCULATED.3IONS-SCNC: 8 MMOL/L (ref 3–14)
ANION GAP SERPL CALCULATED.3IONS-SCNC: 8 MMOL/L (ref 3–14)
ANION GAP SERPL CALCULATED.3IONS-SCNC: 9 MMOL/L (ref 3–14)
APPEARANCE FLD: CLEAR
APPEARANCE FLD: NORMAL
APPEARANCE FLD: NORMAL
APPEARANCE UR: ABNORMAL
AST SERPL W P-5'-P-CCNC: 105 U/L (ref 0–45)
AST SERPL W P-5'-P-CCNC: 105 U/L (ref 0–45)
AST SERPL W P-5'-P-CCNC: 110 U/L (ref 0–45)
AST SERPL W P-5'-P-CCNC: 116 U/L (ref 0–45)
AST SERPL W P-5'-P-CCNC: 117 U/L (ref 0–45)
AST SERPL W P-5'-P-CCNC: 117 U/L (ref 0–45)
AST SERPL W P-5'-P-CCNC: 120 U/L (ref 0–45)
AST SERPL W P-5'-P-CCNC: 121 U/L (ref 0–45)
AST SERPL W P-5'-P-CCNC: 125 U/L (ref 0–45)
AST SERPL W P-5'-P-CCNC: 132 U/L (ref 0–45)
AST SERPL W P-5'-P-CCNC: 134 U/L (ref 0–45)
AST SERPL W P-5'-P-CCNC: 136 U/L (ref 0–45)
AST SERPL W P-5'-P-CCNC: 148 U/L (ref 0–45)
AST SERPL W P-5'-P-CCNC: 149 U/L (ref 0–45)
AST SERPL W P-5'-P-CCNC: 149 U/L (ref 0–45)
AST SERPL W P-5'-P-CCNC: 82 U/L (ref 0–45)
AST SERPL W P-5'-P-CCNC: ABNORMAL U/L (ref 0–45)
BACTERIA #/AREA URNS HPF: ABNORMAL /HPF
BACTERIA SPEC CULT: NO GROWTH
BASE DEFICIT BLDA-SCNC: 7.5 MMOL/L
BASOPHILS # BLD AUTO: 0 10E9/L (ref 0–0.2)
BASOPHILS # BLD AUTO: 0.1 10E9/L (ref 0–0.2)
BASOPHILS NFR BLD AUTO: 0.1 %
BASOPHILS NFR BLD AUTO: 0.2 %
BASOPHILS NFR BLD AUTO: 0.2 %
BASOPHILS NFR BLD AUTO: 0.4 %
BILIRUB DIRECT SERPL-MCNC: 14.4 MG/DL (ref 0–0.2)
BILIRUB DIRECT SERPL-MCNC: 14.8 MG/DL (ref 0–0.2)
BILIRUB SERPL-MCNC: 19.1 MG/DL (ref 0.2–1.3)
BILIRUB SERPL-MCNC: 19.6 MG/DL (ref 0.2–1.3)
BILIRUB SERPL-MCNC: 20 MG/DL (ref 0.2–1.3)
BILIRUB SERPL-MCNC: 20.7 MG/DL (ref 0.2–1.3)
BILIRUB SERPL-MCNC: 20.7 MG/DL (ref 0.2–1.3)
BILIRUB SERPL-MCNC: 21 MG/DL (ref 0.2–1.3)
BILIRUB SERPL-MCNC: 21.2 MG/DL (ref 0.2–1.3)
BILIRUB SERPL-MCNC: 21.5 MG/DL (ref 0.2–1.3)
BILIRUB SERPL-MCNC: 21.6 MG/DL (ref 0.2–1.3)
BILIRUB SERPL-MCNC: 21.7 MG/DL (ref 0.2–1.3)
BILIRUB SERPL-MCNC: 21.8 MG/DL (ref 0.2–1.3)
BILIRUB SERPL-MCNC: 22 MG/DL (ref 0.2–1.3)
BILIRUB SERPL-MCNC: 22.1 MG/DL (ref 0.2–1.3)
BILIRUB SERPL-MCNC: 22.7 MG/DL (ref 0.2–1.3)
BILIRUB SERPL-MCNC: 22.9 MG/DL (ref 0.2–1.3)
BILIRUB SERPL-MCNC: 23.1 MG/DL (ref 0.2–1.3)
BILIRUB SERPL-MCNC: 24.9 MG/DL (ref 0.2–1.3)
BILIRUB UR QL STRIP: ABNORMAL
BLD GP AB SCN SERPL QL: NORMAL
BLOOD BANK CMNT PATIENT-IMP: NORMAL
BUN SERPL-MCNC: 102 MG/DL (ref 7–30)
BUN SERPL-MCNC: 109 MG/DL (ref 7–30)
BUN SERPL-MCNC: 110 MG/DL (ref 7–30)
BUN SERPL-MCNC: 111 MG/DL (ref 7–30)
BUN SERPL-MCNC: 120 MG/DL (ref 7–30)
BUN SERPL-MCNC: 28 MG/DL (ref 7–30)
BUN SERPL-MCNC: 32 MG/DL (ref 7–30)
BUN SERPL-MCNC: 38 MG/DL (ref 7–30)
BUN SERPL-MCNC: 42 MG/DL (ref 7–30)
BUN SERPL-MCNC: 48 MG/DL (ref 7–30)
BUN SERPL-MCNC: 51 MG/DL (ref 7–30)
BUN SERPL-MCNC: 64 MG/DL (ref 7–30)
BUN SERPL-MCNC: 75 MG/DL (ref 7–30)
BUN SERPL-MCNC: 77 MG/DL (ref 7–30)
BUN SERPL-MCNC: 79 MG/DL (ref 7–30)
BUN SERPL-MCNC: 83 MG/DL (ref 7–30)
BUN SERPL-MCNC: 86 MG/DL (ref 7–30)
BUN SERPL-MCNC: 90 MG/DL (ref 7–30)
BUN SERPL-MCNC: 90 MG/DL (ref 7–30)
CALCIUM SERPL-MCNC: 7.9 MG/DL (ref 8.5–10.1)
CALCIUM SERPL-MCNC: 8.1 MG/DL (ref 8.5–10.1)
CALCIUM SERPL-MCNC: 8.2 MG/DL (ref 8.5–10.1)
CALCIUM SERPL-MCNC: 8.3 MG/DL (ref 8.5–10.1)
CALCIUM SERPL-MCNC: 8.3 MG/DL (ref 8.5–10.1)
CALCIUM SERPL-MCNC: 8.4 MG/DL (ref 8.5–10.1)
CALCIUM SERPL-MCNC: 8.4 MG/DL (ref 8.5–10.1)
CALCIUM SERPL-MCNC: 8.5 MG/DL (ref 8.5–10.1)
CALCIUM SERPL-MCNC: 8.6 MG/DL (ref 8.5–10.1)
CALCIUM SERPL-MCNC: 8.6 MG/DL (ref 8.5–10.1)
CALCIUM SERPL-MCNC: 8.8 MG/DL (ref 8.5–10.1)
CHLORIDE SERPL-SCNC: 101 MMOL/L (ref 94–109)
CHLORIDE SERPL-SCNC: 89 MMOL/L (ref 94–109)
CHLORIDE SERPL-SCNC: 93 MMOL/L (ref 94–109)
CHLORIDE SERPL-SCNC: 94 MMOL/L (ref 94–109)
CHLORIDE SERPL-SCNC: 94 MMOL/L (ref 94–109)
CHLORIDE SERPL-SCNC: 95 MMOL/L (ref 94–109)
CHLORIDE SERPL-SCNC: 96 MMOL/L (ref 94–109)
CHLORIDE SERPL-SCNC: 97 MMOL/L (ref 94–109)
CHLORIDE SERPL-SCNC: 97 MMOL/L (ref 94–109)
CHLORIDE SERPL-SCNC: 98 MMOL/L (ref 94–109)
CHLORIDE SERPL-SCNC: 99 MMOL/L (ref 94–109)
CHLORIDE SERPL-SCNC: 99 MMOL/L (ref 94–109)
CO2 SERPL-SCNC: 15 MMOL/L (ref 20–32)
CO2 SERPL-SCNC: 16 MMOL/L (ref 20–32)
CO2 SERPL-SCNC: 16 MMOL/L (ref 20–32)
CO2 SERPL-SCNC: 18 MMOL/L (ref 20–32)
CO2 SERPL-SCNC: 19 MMOL/L (ref 20–32)
CO2 SERPL-SCNC: 19 MMOL/L (ref 20–32)
CO2 SERPL-SCNC: 20 MMOL/L (ref 20–32)
CO2 SERPL-SCNC: 20 MMOL/L (ref 20–32)
CO2 SERPL-SCNC: 21 MMOL/L (ref 20–32)
CO2 SERPL-SCNC: 22 MMOL/L (ref 20–32)
CO2 SERPL-SCNC: 23 MMOL/L (ref 20–32)
CO2 SERPL-SCNC: 23 MMOL/L (ref 20–32)
COLOR FLD: YELLOW
COLOR UR AUTO: ABNORMAL
COPATH REPORT: NORMAL
CREAT BLD-MCNC: 1.5 MG/DL (ref 0.66–1.25)
CREAT SERPL-MCNC: 1.34 MG/DL (ref 0.66–1.25)
CREAT SERPL-MCNC: 1.41 MG/DL (ref 0.66–1.25)
CREAT SERPL-MCNC: 1.61 MG/DL (ref 0.66–1.25)
CREAT SERPL-MCNC: 1.62 MG/DL (ref 0.66–1.25)
CREAT SERPL-MCNC: 1.68 MG/DL (ref 0.66–1.25)
CREAT SERPL-MCNC: 1.76 MG/DL (ref 0.66–1.25)
CREAT SERPL-MCNC: 1.92 MG/DL (ref 0.66–1.25)
CREAT SERPL-MCNC: 1.96 MG/DL (ref 0.66–1.25)
CREAT SERPL-MCNC: 1.98 MG/DL (ref 0.66–1.25)
CREAT SERPL-MCNC: 2.03 MG/DL (ref 0.66–1.25)
CREAT SERPL-MCNC: 2.35 MG/DL (ref 0.66–1.25)
CREAT SERPL-MCNC: 2.36 MG/DL (ref 0.66–1.25)
CREAT SERPL-MCNC: 2.41 MG/DL (ref 0.66–1.25)
CREAT SERPL-MCNC: 2.55 MG/DL (ref 0.66–1.25)
CREAT SERPL-MCNC: 2.58 MG/DL (ref 0.66–1.25)
CREAT SERPL-MCNC: 2.71 MG/DL (ref 0.66–1.25)
CREAT SERPL-MCNC: 2.83 MG/DL (ref 0.66–1.25)
CREAT SERPL-MCNC: 3.13 MG/DL (ref 0.66–1.25)
CREAT SERPL-MCNC: 3.14 MG/DL (ref 0.66–1.25)
CREAT UR-MCNC: 156 MG/DL
CREAT UR-MCNC: 173 MG/DL
CREAT UR-MCNC: 179 MG/DL
DIFFERENTIAL METHOD BLD: ABNORMAL
EOSINOPHIL # BLD AUTO: 0 10E9/L (ref 0–0.7)
EOSINOPHIL # BLD AUTO: 0.1 10E9/L (ref 0–0.7)
EOSINOPHIL NFR BLD AUTO: 0 %
EOSINOPHIL NFR BLD AUTO: 0.4 %
EOSINOPHIL NFR BLD AUTO: 0.4 %
EOSINOPHIL NFR BLD AUTO: 0.5 %
ERYTHROCYTE [DISTWIDTH] IN BLOOD BY AUTOMATED COUNT: 13.7 % (ref 10–15)
ERYTHROCYTE [DISTWIDTH] IN BLOOD BY AUTOMATED COUNT: 13.9 % (ref 10–15)
ERYTHROCYTE [DISTWIDTH] IN BLOOD BY AUTOMATED COUNT: 14 % (ref 10–15)
ERYTHROCYTE [DISTWIDTH] IN BLOOD BY AUTOMATED COUNT: 14.1 % (ref 10–15)
ERYTHROCYTE [DISTWIDTH] IN BLOOD BY AUTOMATED COUNT: 14.2 % (ref 10–15)
ERYTHROCYTE [DISTWIDTH] IN BLOOD BY AUTOMATED COUNT: 14.3 % (ref 10–15)
ERYTHROCYTE [DISTWIDTH] IN BLOOD BY AUTOMATED COUNT: 14.4 % (ref 10–15)
ERYTHROCYTE [DISTWIDTH] IN BLOOD BY AUTOMATED COUNT: 14.5 % (ref 10–15)
ERYTHROCYTE [DISTWIDTH] IN BLOOD BY AUTOMATED COUNT: 14.6 % (ref 10–15)
ERYTHROCYTE [DISTWIDTH] IN BLOOD BY AUTOMATED COUNT: 14.7 % (ref 10–15)
ERYTHROCYTE [DISTWIDTH] IN BLOOD BY AUTOMATED COUNT: 14.9 % (ref 10–15)
ERYTHROCYTE [DISTWIDTH] IN BLOOD BY AUTOMATED COUNT: 14.9 % (ref 10–15)
ERYTHROCYTE [DISTWIDTH] IN BLOOD BY AUTOMATED COUNT: 15 % (ref 10–15)
ETHANOL SERPL-MCNC: <0.01 G/DL
FRACT EXCRET NA UR+SERPL-RTO: <0.1 %
FRACT EXCRET NA UR+SERPL-RTO: <0.1 %
GFR SERPL CREATININE-BSD FRML MDRD: 20 ML/MIN/{1.73_M2}
GFR SERPL CREATININE-BSD FRML MDRD: 20 ML/MIN/{1.73_M2}
GFR SERPL CREATININE-BSD FRML MDRD: 22 ML/MIN/{1.73_M2}
GFR SERPL CREATININE-BSD FRML MDRD: 24 ML/MIN/{1.73_M2}
GFR SERPL CREATININE-BSD FRML MDRD: 25 ML/MIN/{1.73_M2}
GFR SERPL CREATININE-BSD FRML MDRD: 25 ML/MIN/{1.73_M2}
GFR SERPL CREATININE-BSD FRML MDRD: 27 ML/MIN/{1.73_M2}
GFR SERPL CREATININE-BSD FRML MDRD: 28 ML/MIN/{1.73_M2}
GFR SERPL CREATININE-BSD FRML MDRD: 28 ML/MIN/{1.73_M2}
GFR SERPL CREATININE-BSD FRML MDRD: 33 ML/MIN/{1.73_M2}
GFR SERPL CREATININE-BSD FRML MDRD: 34 ML/MIN/{1.73_M2}
GFR SERPL CREATININE-BSD FRML MDRD: 35 ML/MIN/{1.73_M2}
GFR SERPL CREATININE-BSD FRML MDRD: 36 ML/MIN/{1.73_M2}
GFR SERPL CREATININE-BSD FRML MDRD: 40 ML/MIN/{1.73_M2}
GFR SERPL CREATININE-BSD FRML MDRD: 42 ML/MIN/{1.73_M2}
GFR SERPL CREATININE-BSD FRML MDRD: 44 ML/MIN/{1.73_M2}
GFR SERPL CREATININE-BSD FRML MDRD: 44 ML/MIN/{1.73_M2}
GFR SERPL CREATININE-BSD FRML MDRD: 47 ML/MIN/{1.73_M2}
GFR SERPL CREATININE-BSD FRML MDRD: 52 ML/MIN/{1.73_M2}
GFR SERPL CREATININE-BSD FRML MDRD: 55 ML/MIN/{1.73_M2}
GGT SERPL-CCNC: 255 U/L (ref 0–75)
GLUCOSE BLDC GLUCOMTR-MCNC: 106 MG/DL (ref 70–99)
GLUCOSE BLDC GLUCOMTR-MCNC: 122 MG/DL (ref 70–99)
GLUCOSE BLDC GLUCOMTR-MCNC: 123 MG/DL (ref 70–99)
GLUCOSE BLDC GLUCOMTR-MCNC: 125 MG/DL (ref 70–99)
GLUCOSE BLDC GLUCOMTR-MCNC: 126 MG/DL (ref 70–99)
GLUCOSE BLDC GLUCOMTR-MCNC: 128 MG/DL (ref 70–99)
GLUCOSE BLDC GLUCOMTR-MCNC: 130 MG/DL (ref 70–99)
GLUCOSE BLDC GLUCOMTR-MCNC: 131 MG/DL (ref 70–99)
GLUCOSE BLDC GLUCOMTR-MCNC: 132 MG/DL (ref 70–99)
GLUCOSE BLDC GLUCOMTR-MCNC: 133 MG/DL (ref 70–99)
GLUCOSE BLDC GLUCOMTR-MCNC: 133 MG/DL (ref 70–99)
GLUCOSE BLDC GLUCOMTR-MCNC: 134 MG/DL (ref 70–99)
GLUCOSE BLDC GLUCOMTR-MCNC: 135 MG/DL (ref 70–99)
GLUCOSE BLDC GLUCOMTR-MCNC: 135 MG/DL (ref 70–99)
GLUCOSE BLDC GLUCOMTR-MCNC: 141 MG/DL (ref 70–99)
GLUCOSE BLDC GLUCOMTR-MCNC: 142 MG/DL (ref 70–99)
GLUCOSE BLDC GLUCOMTR-MCNC: 143 MG/DL (ref 70–99)
GLUCOSE BLDC GLUCOMTR-MCNC: 143 MG/DL (ref 70–99)
GLUCOSE BLDC GLUCOMTR-MCNC: 144 MG/DL (ref 70–99)
GLUCOSE BLDC GLUCOMTR-MCNC: 145 MG/DL (ref 70–99)
GLUCOSE BLDC GLUCOMTR-MCNC: 147 MG/DL (ref 70–99)
GLUCOSE BLDC GLUCOMTR-MCNC: 149 MG/DL (ref 70–99)
GLUCOSE BLDC GLUCOMTR-MCNC: 149 MG/DL (ref 70–99)
GLUCOSE BLDC GLUCOMTR-MCNC: 152 MG/DL (ref 70–99)
GLUCOSE BLDC GLUCOMTR-MCNC: 153 MG/DL (ref 70–99)
GLUCOSE BLDC GLUCOMTR-MCNC: 154 MG/DL (ref 70–99)
GLUCOSE BLDC GLUCOMTR-MCNC: 159 MG/DL (ref 70–99)
GLUCOSE BLDC GLUCOMTR-MCNC: 160 MG/DL (ref 70–99)
GLUCOSE BLDC GLUCOMTR-MCNC: 160 MG/DL (ref 70–99)
GLUCOSE BLDC GLUCOMTR-MCNC: 161 MG/DL (ref 70–99)
GLUCOSE BLDC GLUCOMTR-MCNC: 166 MG/DL (ref 70–99)
GLUCOSE SERPL-MCNC: 107 MG/DL (ref 70–99)
GLUCOSE SERPL-MCNC: 109 MG/DL (ref 70–99)
GLUCOSE SERPL-MCNC: 109 MG/DL (ref 70–99)
GLUCOSE SERPL-MCNC: 110 MG/DL (ref 70–99)
GLUCOSE SERPL-MCNC: 110 MG/DL (ref 70–99)
GLUCOSE SERPL-MCNC: 115 MG/DL (ref 70–99)
GLUCOSE SERPL-MCNC: 121 MG/DL (ref 70–99)
GLUCOSE SERPL-MCNC: 126 MG/DL (ref 70–99)
GLUCOSE SERPL-MCNC: 126 MG/DL (ref 70–99)
GLUCOSE SERPL-MCNC: 129 MG/DL (ref 70–99)
GLUCOSE SERPL-MCNC: 130 MG/DL (ref 70–99)
GLUCOSE SERPL-MCNC: 132 MG/DL (ref 70–99)
GLUCOSE SERPL-MCNC: 133 MG/DL (ref 70–99)
GLUCOSE SERPL-MCNC: 136 MG/DL (ref 70–99)
GLUCOSE SERPL-MCNC: 137 MG/DL (ref 70–99)
GLUCOSE SERPL-MCNC: 146 MG/DL (ref 70–99)
GLUCOSE SERPL-MCNC: 161 MG/DL (ref 70–99)
GLUCOSE SERPL-MCNC: 169 MG/DL (ref 70–99)
GLUCOSE SERPL-MCNC: 90 MG/DL (ref 70–99)
GLUCOSE UR STRIP-MCNC: NEGATIVE MG/DL
GRAM STN SPEC: NORMAL
GRAN CASTS #/AREA URNS LPF: 8 /LPF
HAV IGM SERPL QL IA: NONREACTIVE
HBV CORE AB SERPL QL IA: NONREACTIVE
HBV SURFACE AG SERPL QL IA: NONREACTIVE
HCO3 BLD-SCNC: 15 MMOL/L (ref 21–28)
HCT VFR BLD AUTO: 20.1 % (ref 40–53)
HCT VFR BLD AUTO: 20.8 % (ref 40–53)
HCT VFR BLD AUTO: 21.7 % (ref 40–53)
HCT VFR BLD AUTO: 23.1 % (ref 40–53)
HCT VFR BLD AUTO: 24.6 % (ref 40–53)
HCT VFR BLD AUTO: 25.5 % (ref 40–53)
HCT VFR BLD AUTO: 25.5 % (ref 40–53)
HCT VFR BLD AUTO: 26 % (ref 40–53)
HCT VFR BLD AUTO: 26.2 % (ref 40–53)
HCT VFR BLD AUTO: 26.3 % (ref 40–53)
HCT VFR BLD AUTO: 26.4 % (ref 40–53)
HCT VFR BLD AUTO: 26.4 % (ref 40–53)
HCT VFR BLD AUTO: 26.5 % (ref 40–53)
HCT VFR BLD AUTO: 28.5 % (ref 40–53)
HCT VFR BLD AUTO: 28.5 % (ref 40–53)
HCT VFR BLD AUTO: 28.9 % (ref 40–53)
HCT VFR BLD AUTO: 29.7 % (ref 40–53)
HCT VFR BLD AUTO: 30 % (ref 40–53)
HCT VFR BLD AUTO: 30.4 % (ref 40–53)
HCV AB SERPL QL IA: NONREACTIVE
HGB BLD-MCNC: 10.1 G/DL (ref 13.3–17.7)
HGB BLD-MCNC: 10.4 G/DL (ref 13.3–17.7)
HGB BLD-MCNC: 10.5 G/DL (ref 13.3–17.7)
HGB BLD-MCNC: 10.8 G/DL (ref 13.3–17.7)
HGB BLD-MCNC: 10.8 G/DL (ref 13.3–17.7)
HGB BLD-MCNC: 11 G/DL (ref 13.3–17.7)
HGB BLD-MCNC: 11.3 G/DL (ref 13.3–17.7)
HGB BLD-MCNC: 7.1 G/DL (ref 13.3–17.7)
HGB BLD-MCNC: 7.7 G/DL (ref 13.3–17.7)
HGB BLD-MCNC: 7.9 G/DL (ref 13.3–17.7)
HGB BLD-MCNC: 8.3 G/DL (ref 13.3–17.7)
HGB BLD-MCNC: 8.8 G/DL (ref 13.3–17.7)
HGB BLD-MCNC: 9 G/DL (ref 13.3–17.7)
HGB BLD-MCNC: 9.1 G/DL (ref 13.3–17.7)
HGB BLD-MCNC: 9.2 G/DL (ref 13.3–17.7)
HGB BLD-MCNC: 9.4 G/DL (ref 13.3–17.7)
HGB BLD-MCNC: 9.6 G/DL (ref 13.3–17.7)
HGB BLD-MCNC: 9.8 G/DL (ref 13.3–17.7)
HGB UR QL STRIP: ABNORMAL
HGB UR QL STRIP: NEGATIVE
HGB UR QL STRIP: NEGATIVE
HYALINE CASTS #/AREA URNS LPF: 31 /LPF (ref 0–2)
HYALINE CASTS #/AREA URNS LPF: 8 /LPF (ref 0–2)
IMM GRANULOCYTES # BLD: 0.1 10E9/L (ref 0–0.4)
IMM GRANULOCYTES # BLD: 0.1 10E9/L (ref 0–0.4)
IMM GRANULOCYTES # BLD: 0.2 10E9/L (ref 0–0.4)
IMM GRANULOCYTES # BLD: 0.2 10E9/L (ref 0–0.4)
IMM GRANULOCYTES # BLD: 0.3 10E9/L (ref 0–0.4)
IMM GRANULOCYTES # BLD: 0.3 10E9/L (ref 0–0.4)
IMM GRANULOCYTES # BLD: 0.7 10E9/L (ref 0–0.4)
IMM GRANULOCYTES # BLD: 0.8 10E9/L (ref 0–0.4)
IMM GRANULOCYTES NFR BLD: 0.9 %
IMM GRANULOCYTES NFR BLD: 0.9 %
IMM GRANULOCYTES NFR BLD: 1 %
IMM GRANULOCYTES NFR BLD: 1.1 %
IMM GRANULOCYTES NFR BLD: 1.2 %
IMM GRANULOCYTES NFR BLD: 1.2 %
IMM GRANULOCYTES NFR BLD: 2.7 %
IMM GRANULOCYTES NFR BLD: 3.1 %
INR PPP: 2.61 (ref 0.86–1.14)
INR PPP: 2.76 (ref 0.86–1.14)
INR PPP: 2.97 (ref 0.86–1.14)
INR PPP: 3.22 (ref 0.86–1.14)
INR PPP: 3.43 (ref 0.86–1.14)
INR PPP: 3.82 (ref 0.86–1.14)
INR PPP: 4.01 (ref 0.86–1.14)
INR PPP: 4.6 (ref 0.86–1.14)
INTERPRETATION ECG - MUSE: NORMAL
KETONES UR STRIP-MCNC: ABNORMAL MG/DL
KETONES UR STRIP-MCNC: ABNORMAL MG/DL
KETONES UR STRIP-MCNC: NEGATIVE MG/DL
LABORATORY COMMENT REPORT: NORMAL
LACTATE BLD-SCNC: 1.7 MMOL/L (ref 0.7–2)
LACTATE BLD-SCNC: 1.9 MMOL/L (ref 0.7–2)
LACTATE BLD-SCNC: 2.1 MMOL/L (ref 0.7–2)
LACTATE BLD-SCNC: 2.3 MMOL/L (ref 0.7–2)
LACTATE BLD-SCNC: 2.7 MMOL/L (ref 0.7–2)
LACTATE BLD-SCNC: 3.4 MMOL/L (ref 0.7–2)
LACTATE BLD-SCNC: 3.7 MMOL/L (ref 0.7–2)
LACTATE BLD-SCNC: 3.9 MMOL/L (ref 0.7–2)
LACTATE BLD-SCNC: 4.2 MMOL/L (ref 0.7–2)
LEUKOCYTE ESTERASE UR QL STRIP: NEGATIVE
LIPASE SERPL-CCNC: 706 U/L (ref 73–393)
LYMPHOCYTES # BLD AUTO: 0.7 10E9/L (ref 0.8–5.3)
LYMPHOCYTES # BLD AUTO: 0.9 10E9/L (ref 0.8–5.3)
LYMPHOCYTES # BLD AUTO: 0.9 10E9/L (ref 0.8–5.3)
LYMPHOCYTES # BLD AUTO: 1 10E9/L (ref 0.8–5.3)
LYMPHOCYTES # BLD AUTO: 1 10E9/L (ref 0.8–5.3)
LYMPHOCYTES # BLD AUTO: 1.1 10E9/L (ref 0.8–5.3)
LYMPHOCYTES # BLD AUTO: 1.1 10E9/L (ref 0.8–5.3)
LYMPHOCYTES # BLD AUTO: 1.3 10E9/L (ref 0.8–5.3)
LYMPHOCYTES NFR BLD AUTO: 2.3 %
LYMPHOCYTES NFR BLD AUTO: 3.3 %
LYMPHOCYTES NFR BLD AUTO: 3.9 %
LYMPHOCYTES NFR BLD AUTO: 6.1 %
LYMPHOCYTES NFR BLD AUTO: 6.7 %
LYMPHOCYTES NFR BLD AUTO: 6.9 %
LYMPHOCYTES NFR BLD AUTO: 8.3 %
LYMPHOCYTES NFR BLD AUTO: 8.6 %
LYMPHOCYTES NFR FLD MANUAL: 16 %
LYMPHOCYTES NFR FLD MANUAL: 22 %
LYMPHOCYTES NFR FLD MANUAL: 3 %
Lab: NORMAL
MACROCYTES BLD QL SMEAR: PRESENT
MAGNESIUM SERPL-MCNC: 2.4 MG/DL (ref 1.6–2.3)
MAGNESIUM SERPL-MCNC: 3.4 MG/DL (ref 1.6–2.3)
MCH RBC QN AUTO: 38 PG (ref 26.5–33)
MCH RBC QN AUTO: 38.1 PG (ref 26.5–33)
MCH RBC QN AUTO: 38.2 PG (ref 26.5–33)
MCH RBC QN AUTO: 38.2 PG (ref 26.5–33)
MCH RBC QN AUTO: 38.4 PG (ref 26.5–33)
MCH RBC QN AUTO: 38.5 PG (ref 26.5–33)
MCH RBC QN AUTO: 38.5 PG (ref 26.5–33)
MCH RBC QN AUTO: 38.6 PG (ref 26.5–33)
MCH RBC QN AUTO: 38.7 PG (ref 26.5–33)
MCH RBC QN AUTO: 39 PG (ref 26.5–33)
MCH RBC QN AUTO: 39.2 PG (ref 26.5–33)
MCH RBC QN AUTO: 39.3 PG (ref 26.5–33)
MCH RBC QN AUTO: 39.5 PG (ref 26.5–33)
MCHC RBC AUTO-ENTMCNC: 34.6 G/DL (ref 31.5–36.5)
MCHC RBC AUTO-ENTMCNC: 34.7 G/DL (ref 31.5–36.5)
MCHC RBC AUTO-ENTMCNC: 34.8 G/DL (ref 31.5–36.5)
MCHC RBC AUTO-ENTMCNC: 35.3 G/DL (ref 31.5–36.5)
MCHC RBC AUTO-ENTMCNC: 35.3 G/DL (ref 31.5–36.5)
MCHC RBC AUTO-ENTMCNC: 35.4 G/DL (ref 31.5–36.5)
MCHC RBC AUTO-ENTMCNC: 35.8 G/DL (ref 31.5–36.5)
MCHC RBC AUTO-ENTMCNC: 35.9 G/DL (ref 31.5–36.5)
MCHC RBC AUTO-ENTMCNC: 35.9 G/DL (ref 31.5–36.5)
MCHC RBC AUTO-ENTMCNC: 36 G/DL (ref 31.5–36.5)
MCHC RBC AUTO-ENTMCNC: 36.1 G/DL (ref 31.5–36.5)
MCHC RBC AUTO-ENTMCNC: 36.4 G/DL (ref 31.5–36.5)
MCHC RBC AUTO-ENTMCNC: 36.5 G/DL (ref 31.5–36.5)
MCHC RBC AUTO-ENTMCNC: 36.9 G/DL (ref 31.5–36.5)
MCHC RBC AUTO-ENTMCNC: 37 G/DL (ref 31.5–36.5)
MCHC RBC AUTO-ENTMCNC: 37 G/DL (ref 31.5–36.5)
MCHC RBC AUTO-ENTMCNC: 37.1 G/DL (ref 31.5–36.5)
MCHC RBC AUTO-ENTMCNC: 37.2 G/DL (ref 31.5–36.5)
MCHC RBC AUTO-ENTMCNC: 37.4 G/DL (ref 31.5–36.5)
MCV RBC AUTO: 103 FL (ref 78–100)
MCV RBC AUTO: 103 FL (ref 78–100)
MCV RBC AUTO: 104 FL (ref 78–100)
MCV RBC AUTO: 104 FL (ref 78–100)
MCV RBC AUTO: 105 FL (ref 78–100)
MCV RBC AUTO: 106 FL (ref 78–100)
MCV RBC AUTO: 107 FL (ref 78–100)
MCV RBC AUTO: 108 FL (ref 78–100)
MCV RBC AUTO: 109 FL (ref 78–100)
MCV RBC AUTO: 109 FL (ref 78–100)
MCV RBC AUTO: 110 FL (ref 78–100)
MCV RBC AUTO: 110 FL (ref 78–100)
MCV RBC AUTO: 111 FL (ref 78–100)
MIXED CELL CASTS #/AREA URNS LPF: 2 /LPF
MONOCYTES # BLD AUTO: 0.8 10E9/L (ref 0–1.3)
MONOCYTES # BLD AUTO: 0.8 10E9/L (ref 0–1.3)
MONOCYTES # BLD AUTO: 1 10E9/L (ref 0–1.3)
MONOCYTES # BLD AUTO: 1 10E9/L (ref 0–1.3)
MONOCYTES # BLD AUTO: 1.2 10E9/L (ref 0–1.3)
MONOCYTES # BLD AUTO: 1.2 10E9/L (ref 0–1.3)
MONOCYTES # BLD AUTO: 1.4 10E9/L (ref 0–1.3)
MONOCYTES # BLD AUTO: 2 10E9/L (ref 0–1.3)
MONOCYTES NFR BLD AUTO: 3.1 %
MONOCYTES NFR BLD AUTO: 4 %
MONOCYTES NFR BLD AUTO: 4.4 %
MONOCYTES NFR BLD AUTO: 6.3 %
MONOCYTES NFR BLD AUTO: 7.2 %
MONOCYTES NFR BLD AUTO: 8.1 %
MONOCYTES NFR BLD AUTO: 8.9 %
MONOCYTES NFR BLD AUTO: 9.3 %
MONOS+MACROS NFR FLD MANUAL: 16 %
MONOS+MACROS NFR FLD MANUAL: 6 %
MONOS+MACROS NFR FLD MANUAL: 79 %
MUCOUS THREADS #/AREA URNS LPF: PRESENT /LPF
NEUTROPHILS # BLD AUTO: 10.6 10E9/L (ref 1.6–8.3)
NEUTROPHILS # BLD AUTO: 12.1 10E9/L (ref 1.6–8.3)
NEUTROPHILS # BLD AUTO: 13 10E9/L (ref 1.6–8.3)
NEUTROPHILS # BLD AUTO: 17.6 10E9/L (ref 1.6–8.3)
NEUTROPHILS # BLD AUTO: 19.6 10E9/L (ref 1.6–8.3)
NEUTROPHILS # BLD AUTO: 25.7 10E9/L (ref 1.6–8.3)
NEUTROPHILS # BLD AUTO: 29.3 10E9/L (ref 1.6–8.3)
NEUTROPHILS # BLD AUTO: 9.4 10E9/L (ref 1.6–8.3)
NEUTROPHILS NFR BLD AUTO: 82.7 %
NEUTROPHILS NFR BLD AUTO: 83 %
NEUTROPHILS NFR BLD AUTO: 83.3 %
NEUTROPHILS NFR BLD AUTO: 83.9 %
NEUTROPHILS NFR BLD AUTO: 84 %
NEUTROPHILS NFR BLD AUTO: 88 %
NEUTROPHILS NFR BLD AUTO: 89.4 %
NEUTROPHILS NFR BLD AUTO: 93.6 %
NEUTS BAND NFR FLD MANUAL: 1 %
NEUTS BAND NFR FLD MANUAL: 72 %
NEUTS BAND NFR FLD MANUAL: 81 %
NITRATE UR QL: NEGATIVE
NRBC # BLD AUTO: 0 10*3/UL
NRBC BLD AUTO-RTO: 0 /100
O2/TOTAL GAS SETTING VFR VENT: 5 %
OTHER CELLS FLD MANUAL: 4 %
OXYHGB MFR BLD: 97 % (ref 92–100)
PCO2 BLD: 21 MM HG (ref 35–45)
PH BLD: 7.47 PH (ref 7.35–7.45)
PH UR STRIP: 5 PH (ref 5–7)
PH UR STRIP: 5 PH (ref 5–7)
PH UR STRIP: 6 PH (ref 5–7)
PHOSPHATE SERPL-MCNC: 2.9 MG/DL (ref 2.5–4.5)
PHOSPHATE SERPL-MCNC: 3 MG/DL (ref 2.5–4.5)
PHOSPHATE SERPL-MCNC: 3.2 MG/DL (ref 2.5–4.5)
PHOSPHATE SERPL-MCNC: 3.2 MG/DL (ref 2.5–4.5)
PHOSPHATE SERPL-MCNC: 3.4 MG/DL (ref 2.5–4.5)
PHOSPHATE SERPL-MCNC: 3.5 MG/DL (ref 2.5–4.5)
PLATELET # BLD AUTO: 113 10E9/L (ref 150–450)
PLATELET # BLD AUTO: 114 10E9/L (ref 150–450)
PLATELET # BLD AUTO: 135 10E9/L (ref 150–450)
PLATELET # BLD AUTO: 166 10E9/L (ref 150–450)
PLATELET # BLD AUTO: 170 10E9/L (ref 150–450)
PLATELET # BLD AUTO: 173 10E9/L (ref 150–450)
PLATELET # BLD AUTO: 181 10E9/L (ref 150–450)
PLATELET # BLD AUTO: 191 10E9/L (ref 150–450)
PLATELET # BLD AUTO: 197 10E9/L (ref 150–450)
PLATELET # BLD AUTO: 206 10E9/L (ref 150–450)
PLATELET # BLD AUTO: 40 10E9/L (ref 150–450)
PLATELET # BLD AUTO: 41 10E9/L (ref 150–450)
PLATELET # BLD AUTO: 45 10E9/L (ref 150–450)
PLATELET # BLD AUTO: 45 10E9/L (ref 150–450)
PLATELET # BLD AUTO: 53 10E9/L (ref 150–450)
PLATELET # BLD AUTO: 60 10E9/L (ref 150–450)
PLATELET # BLD AUTO: 64 10E9/L (ref 150–450)
PLATELET # BLD AUTO: 67 10E9/L (ref 150–450)
PLATELET # BLD AUTO: 79 10E9/L (ref 150–450)
PLATELET # BLD EST: ABNORMAL 10*3/UL
PO2 BLD: 82 MM HG (ref 80–105)
POTASSIUM SERPL-SCNC: 3.3 MMOL/L (ref 3.4–5.3)
POTASSIUM SERPL-SCNC: 3.5 MMOL/L (ref 3.4–5.3)
POTASSIUM SERPL-SCNC: 3.6 MMOL/L (ref 3.4–5.3)
POTASSIUM SERPL-SCNC: 3.7 MMOL/L (ref 3.4–5.3)
POTASSIUM SERPL-SCNC: 3.7 MMOL/L (ref 3.4–5.3)
POTASSIUM SERPL-SCNC: 3.8 MMOL/L (ref 3.4–5.3)
POTASSIUM SERPL-SCNC: 3.9 MMOL/L (ref 3.4–5.3)
POTASSIUM SERPL-SCNC: 4 MMOL/L (ref 3.4–5.3)
POTASSIUM SERPL-SCNC: 4 MMOL/L (ref 3.4–5.3)
POTASSIUM SERPL-SCNC: 4.1 MMOL/L (ref 3.4–5.3)
POTASSIUM SERPL-SCNC: 4.2 MMOL/L (ref 3.4–5.3)
POTASSIUM SERPL-SCNC: 4.4 MMOL/L (ref 3.4–5.3)
POTASSIUM SERPL-SCNC: 4.4 MMOL/L (ref 3.4–5.3)
POTASSIUM SERPL-SCNC: 5.4 MMOL/L (ref 3.4–5.3)
PROCALCITONIN SERPL-MCNC: 0.9 NG/ML
PROT FLD-MCNC: 0.9 G/DL
PROT SERPL-MCNC: 5.3 G/DL (ref 6.8–8.8)
PROT SERPL-MCNC: 5.5 G/DL (ref 6.8–8.8)
PROT SERPL-MCNC: 5.7 G/DL (ref 6.8–8.8)
PROT SERPL-MCNC: 5.8 G/DL (ref 6.8–8.8)
PROT SERPL-MCNC: 6 G/DL (ref 6.8–8.8)
PROT SERPL-MCNC: 6.2 G/DL (ref 6.8–8.8)
PROT SERPL-MCNC: 6.5 G/DL (ref 6.8–8.8)
PROT SERPL-MCNC: 6.5 G/DL (ref 6.8–8.8)
PROT SERPL-MCNC: 6.7 G/DL (ref 6.8–8.8)
PROT SERPL-MCNC: 7 G/DL (ref 6.8–8.8)
PROT SERPL-MCNC: 7.1 G/DL (ref 6.8–8.8)
PROT SERPL-MCNC: 7.3 G/DL (ref 6.8–8.8)
PROT SERPL-MCNC: 7.5 G/DL (ref 6.8–8.8)
PROT UR-MCNC: 0.48 G/L
PROT/CREAT 24H UR: 0.31 G/G CR (ref 0–0.2)
RADIOLOGIST FLAGS: ABNORMAL
RBC # BLD AUTO: 1.87 10E12/L (ref 4.4–5.9)
RBC # BLD AUTO: 2.02 10E12/L (ref 4.4–5.9)
RBC # BLD AUTO: 2.04 10E12/L (ref 4.4–5.9)
RBC # BLD AUTO: 2.16 10E12/L (ref 4.4–5.9)
RBC # BLD AUTO: 2.28 10E12/L (ref 4.4–5.9)
RBC # BLD AUTO: 2.36 10E12/L (ref 4.4–5.9)
RBC # BLD AUTO: 2.39 10E12/L (ref 4.4–5.9)
RBC # BLD AUTO: 2.39 10E12/L (ref 4.4–5.9)
RBC # BLD AUTO: 2.4 10E12/L (ref 4.4–5.9)
RBC # BLD AUTO: 2.41 10E12/L (ref 4.4–5.9)
RBC # BLD AUTO: 2.41 10E12/L (ref 4.4–5.9)
RBC # BLD AUTO: 2.43 10E12/L (ref 4.4–5.9)
RBC # BLD AUTO: 2.55 10E12/L (ref 4.4–5.9)
RBC # BLD AUTO: 2.62 10E12/L (ref 4.4–5.9)
RBC # BLD AUTO: 2.71 10E12/L (ref 4.4–5.9)
RBC # BLD AUTO: 2.77 10E12/L (ref 4.4–5.9)
RBC # BLD AUTO: 2.8 10E12/L (ref 4.4–5.9)
RBC # BLD AUTO: 2.81 10E12/L (ref 4.4–5.9)
RBC # BLD AUTO: 2.94 10E12/L (ref 4.4–5.9)
RBC #/AREA URNS AUTO: 2 /HPF (ref 0–2)
RBC #/AREA URNS AUTO: <1 /HPF (ref 0–2)
RBC #/AREA URNS AUTO: <1 /HPF (ref 0–2)
SARS-COV-2 RNA SPEC QL NAA+PROBE: NEGATIVE
SODIUM SERPL-SCNC: 119 MMOL/L (ref 133–144)
SODIUM SERPL-SCNC: 121 MMOL/L (ref 133–144)
SODIUM SERPL-SCNC: 121 MMOL/L (ref 133–144)
SODIUM SERPL-SCNC: 122 MMOL/L (ref 133–144)
SODIUM SERPL-SCNC: 123 MMOL/L (ref 133–144)
SODIUM SERPL-SCNC: 124 MMOL/L (ref 133–144)
SODIUM SERPL-SCNC: 125 MMOL/L (ref 133–144)
SODIUM SERPL-SCNC: 126 MMOL/L (ref 133–144)
SODIUM SERPL-SCNC: 126 MMOL/L (ref 133–144)
SODIUM SERPL-SCNC: 127 MMOL/L (ref 133–144)
SODIUM UR-SCNC: <5 MMOL/L
SOURCE: ABNORMAL
SP GR UR STRIP: 1.02 (ref 1–1.03)
SPECIMEN EXP DATE BLD: NORMAL
SPECIMEN SOURCE FLD: NORMAL
SPECIMEN SOURCE: NORMAL
SQUAMOUS #/AREA URNS AUTO: 1 /HPF (ref 0–1)
SQUAMOUS #/AREA URNS AUTO: 1 /HPF (ref 0–1)
TOXIC GRANULES BLD QL SMEAR: PRESENT
UROBILINOGEN UR STRIP-MCNC: 2 MG/DL (ref 0–2)
UROBILINOGEN UR STRIP-MCNC: 3 MG/DL (ref 0–2)
UROBILINOGEN UR STRIP-MCNC: NORMAL MG/DL (ref 0–2)
WBC # BLD AUTO: 11.3 10E9/L (ref 4–11)
WBC # BLD AUTO: 12 10E9/L (ref 4–11)
WBC # BLD AUTO: 12.6 10E9/L (ref 4–11)
WBC # BLD AUTO: 14.4 10E9/L (ref 4–11)
WBC # BLD AUTO: 15.7 10E9/L (ref 4–11)
WBC # BLD AUTO: 18 10E9/L (ref 4–11)
WBC # BLD AUTO: 18.8 10E9/L (ref 4–11)
WBC # BLD AUTO: 19.3 10E9/L (ref 4–11)
WBC # BLD AUTO: 21.1 10E9/L (ref 4–11)
WBC # BLD AUTO: 21.7 10E9/L (ref 4–11)
WBC # BLD AUTO: 22.3 10E9/L (ref 4–11)
WBC # BLD AUTO: 23.2 10E9/L (ref 4–11)
WBC # BLD AUTO: 24.3 10E9/L (ref 4–11)
WBC # BLD AUTO: 24.5 10E9/L (ref 4–11)
WBC # BLD AUTO: 24.7 10E9/L (ref 4–11)
WBC # BLD AUTO: 25.8 10E9/L (ref 4–11)
WBC # BLD AUTO: 25.9 10E9/L (ref 4–11)
WBC # BLD AUTO: 28.8 10E9/L (ref 4–11)
WBC # BLD AUTO: 31.3 10E9/L (ref 4–11)
WBC # FLD AUTO: 1800 /UL
WBC # FLD AUTO: 207 /UL
WBC # FLD AUTO: 9245 /UL
WBC #/AREA URNS AUTO: 1 /HPF (ref 0–5)
WBC #/AREA URNS AUTO: 4 /HPF (ref 0–5)
WBC #/AREA URNS AUTO: 6 /HPF (ref 0–5)

## 2021-01-01 PROCEDURE — 36415 COLL VENOUS BLD VENIPUNCTURE: CPT | Performed by: INTERNAL MEDICINE

## 2021-01-01 PROCEDURE — 250N000013 HC RX MED GY IP 250 OP 250 PS 637: Performed by: PHYSICIAN ASSISTANT

## 2021-01-01 PROCEDURE — 250N000013 HC RX MED GY IP 250 OP 250 PS 637: Performed by: HOSPITALIST

## 2021-01-01 PROCEDURE — 99233 SBSQ HOSP IP/OBS HIGH 50: CPT | Performed by: INTERNAL MEDICINE

## 2021-01-01 PROCEDURE — 250N000013 HC RX MED GY IP 250 OP 250 PS 637: Performed by: INTERNAL MEDICINE

## 2021-01-01 PROCEDURE — 80048 BASIC METABOLIC PNL TOTAL CA: CPT | Performed by: INTERNAL MEDICINE

## 2021-01-01 PROCEDURE — 88112 CYTOPATH CELL ENHANCE TECH: CPT | Mod: TC | Performed by: HOSPITALIST

## 2021-01-01 PROCEDURE — 99232 SBSQ HOSP IP/OBS MODERATE 35: CPT | Performed by: INTERNAL MEDICINE

## 2021-01-01 PROCEDURE — 80053 COMPREHEN METABOLIC PANEL: CPT | Performed by: INTERNAL MEDICINE

## 2021-01-01 PROCEDURE — 250N000011 HC RX IP 250 OP 636: Performed by: INTERNAL MEDICINE

## 2021-01-01 PROCEDURE — 83735 ASSAY OF MAGNESIUM: CPT | Performed by: INTERNAL MEDICINE

## 2021-01-01 PROCEDURE — P9047 ALBUMIN (HUMAN), 25%, 50ML: HCPCS | Performed by: INTERNAL MEDICINE

## 2021-01-01 PROCEDURE — 250N000009 HC RX 250: Performed by: RADIOLOGY

## 2021-01-01 PROCEDURE — 99223 1ST HOSP IP/OBS HIGH 75: CPT | Performed by: CLINICAL NURSE SPECIALIST

## 2021-01-01 PROCEDURE — 83605 ASSAY OF LACTIC ACID: CPT | Performed by: INTERNAL MEDICINE

## 2021-01-01 PROCEDURE — 250N000012 HC RX MED GY IP 250 OP 636 PS 637: Performed by: PHYSICIAN ASSISTANT

## 2021-01-01 PROCEDURE — 85027 COMPLETE CBC AUTOMATED: CPT | Performed by: INTERNAL MEDICINE

## 2021-01-01 PROCEDURE — 999N000216 HC STATISTIC ADULT CD FACE TO FACE-NO CHRG

## 2021-01-01 PROCEDURE — 86803 HEPATITIS C AB TEST: CPT | Performed by: HOSPITALIST

## 2021-01-01 PROCEDURE — 999N001017 HC STATISTIC GLUCOSE BY METER IP

## 2021-01-01 PROCEDURE — 85018 HEMOGLOBIN: CPT | Performed by: INTERNAL MEDICINE

## 2021-01-01 PROCEDURE — 120N000001 HC R&B MED SURG/OB

## 2021-01-01 PROCEDURE — 84100 ASSAY OF PHOSPHORUS: CPT | Performed by: INTERNAL MEDICINE

## 2021-01-01 PROCEDURE — 84300 ASSAY OF URINE SODIUM: CPT | Performed by: INTERNAL MEDICINE

## 2021-01-01 PROCEDURE — 85049 AUTOMATED PLATELET COUNT: CPT | Performed by: RADIOLOGY

## 2021-01-01 PROCEDURE — 85025 COMPLETE CBC W/AUTO DIFF WBC: CPT | Performed by: INTERNAL MEDICINE

## 2021-01-01 PROCEDURE — 49083 ABD PARACENTESIS W/IMAGING: CPT

## 2021-01-01 PROCEDURE — 97530 THERAPEUTIC ACTIVITIES: CPT | Mod: GP | Performed by: PHYSICAL THERAPIST

## 2021-01-01 PROCEDURE — 85610 PROTHROMBIN TIME: CPT | Performed by: INTERNAL MEDICINE

## 2021-01-01 PROCEDURE — 0W9G3ZZ DRAINAGE OF PERITONEAL CAVITY, PERCUTANEOUS APPROACH: ICD-10-PCS | Performed by: RADIOLOGY

## 2021-01-01 PROCEDURE — 0DH97UZ INSERTION OF FEEDING DEVICE INTO DUODENUM, VIA NATURAL OR ARTIFICIAL OPENING: ICD-10-PCS | Performed by: RADIOLOGY

## 2021-01-01 PROCEDURE — 258N000003 HC RX IP 258 OP 636: Performed by: EMERGENCY MEDICINE

## 2021-01-01 PROCEDURE — 258N000003 HC RX IP 258 OP 636: Performed by: INTERNAL MEDICINE

## 2021-01-01 PROCEDURE — 36415 COLL VENOUS BLD VENIPUNCTURE: CPT | Performed by: HOSPITALIST

## 2021-01-01 PROCEDURE — 86709 HEPATITIS A IGM ANTIBODY: CPT | Performed by: HOSPITALIST

## 2021-01-01 PROCEDURE — 44500 INTRO GASTROINTESTINAL TUBE: CPT

## 2021-01-01 PROCEDURE — 82077 ASSAY SPEC XCP UR&BREATH IA: CPT | Performed by: EMERGENCY MEDICINE

## 2021-01-01 PROCEDURE — 80076 HEPATIC FUNCTION PANEL: CPT | Performed by: HOSPITALIST

## 2021-01-01 PROCEDURE — 89051 BODY FLUID CELL COUNT: CPT | Performed by: INTERNAL MEDICINE

## 2021-01-01 PROCEDURE — 81001 URINALYSIS AUTO W/SCOPE: CPT | Performed by: INTERNAL MEDICINE

## 2021-01-01 PROCEDURE — 74340 X-RAY GUIDE FOR GI TUBE: CPT

## 2021-01-01 PROCEDURE — 99233 SBSQ HOSP IP/OBS HIGH 50: CPT | Performed by: CLINICAL NURSE SPECIALIST

## 2021-01-01 PROCEDURE — 99223 1ST HOSP IP/OBS HIGH 75: CPT | Performed by: HOSPITALIST

## 2021-01-01 PROCEDURE — C9803 HOPD COVID-19 SPEC COLLECT: HCPCS

## 2021-01-01 PROCEDURE — 999N001014 HC STATISTIC CYTO WRIGHT STAIN TC: Performed by: HOSPITALIST

## 2021-01-01 PROCEDURE — 258N000001 HC RX 258: Performed by: INTERNAL MEDICINE

## 2021-01-01 PROCEDURE — 86901 BLOOD TYPING SEROLOGIC RH(D): CPT | Performed by: INTERNAL MEDICINE

## 2021-01-01 PROCEDURE — 99291 CRITICAL CARE FIRST HOUR: CPT | Mod: 25

## 2021-01-01 PROCEDURE — 250N000012 HC RX MED GY IP 250 OP 636 PS 637: Performed by: HOSPITALIST

## 2021-01-01 PROCEDURE — 97535 SELF CARE MNGMENT TRAINING: CPT | Mod: GO

## 2021-01-01 PROCEDURE — 999N000156 HC STATISTIC RCP CONSULT EA 30 MIN

## 2021-01-01 PROCEDURE — 82140 ASSAY OF AMMONIA: CPT | Performed by: HOSPITALIST

## 2021-01-01 PROCEDURE — 84156 ASSAY OF PROTEIN URINE: CPT | Performed by: INTERNAL MEDICINE

## 2021-01-01 PROCEDURE — 82565 ASSAY OF CREATININE: CPT

## 2021-01-01 PROCEDURE — 88305 TISSUE EXAM BY PATHOLOGIST: CPT | Mod: 26 | Performed by: PATHOLOGY

## 2021-01-01 PROCEDURE — 272N000042 US PARACENTESIS

## 2021-01-01 PROCEDURE — 82805 BLOOD GASES W/O2 SATURATION: CPT | Performed by: INTERNAL MEDICINE

## 2021-01-01 PROCEDURE — 99207 PR NO BILLABLE SERVICE THIS VISIT: CPT | Performed by: INTERNAL MEDICINE

## 2021-01-01 PROCEDURE — 99207 PR CDG-CODE CATEGORY CHANGED: CPT | Performed by: INTERNAL MEDICINE

## 2021-01-01 PROCEDURE — 83690 ASSAY OF LIPASE: CPT | Performed by: EMERGENCY MEDICINE

## 2021-01-01 PROCEDURE — 99223 1ST HOSP IP/OBS HIGH 75: CPT | Performed by: INTERNAL MEDICINE

## 2021-01-01 PROCEDURE — 92610 EVALUATE SWALLOWING FUNCTION: CPT | Mod: GN | Performed by: SPEECH-LANGUAGE PATHOLOGIST

## 2021-01-01 PROCEDURE — 250N000009 HC RX 250: Performed by: INTERNAL MEDICINE

## 2021-01-01 PROCEDURE — 85610 PROTHROMBIN TIME: CPT | Performed by: RADIOLOGY

## 2021-01-01 PROCEDURE — 97162 PT EVAL MOD COMPLEX 30 MIN: CPT | Mod: GP | Performed by: PHYSICAL THERAPIST

## 2021-01-01 PROCEDURE — 76770 US EXAM ABDO BACK WALL COMP: CPT

## 2021-01-01 PROCEDURE — 87340 HEPATITIS B SURFACE AG IA: CPT | Performed by: HOSPITALIST

## 2021-01-01 PROCEDURE — 82140 ASSAY OF AMMONIA: CPT | Performed by: INTERNAL MEDICINE

## 2021-01-01 PROCEDURE — 92526 ORAL FUNCTION THERAPY: CPT | Mod: GN

## 2021-01-01 PROCEDURE — 92526 ORAL FUNCTION THERAPY: CPT | Mod: GN | Performed by: SPEECH-LANGUAGE PATHOLOGIST

## 2021-01-01 PROCEDURE — 97530 THERAPEUTIC ACTIVITIES: CPT | Mod: GO | Performed by: REHABILITATION PRACTITIONER

## 2021-01-01 PROCEDURE — 97530 THERAPEUTIC ACTIVITIES: CPT | Mod: GP

## 2021-01-01 PROCEDURE — 82977 ASSAY OF GGT: CPT | Performed by: EMERGENCY MEDICINE

## 2021-01-01 PROCEDURE — 70450 CT HEAD/BRAIN W/O DYE: CPT | Mod: ME

## 2021-01-01 PROCEDURE — 272N000078 HC NUTRITION PRODUCT INTERMEDIATE LITER

## 2021-01-01 PROCEDURE — 84295 ASSAY OF SERUM SODIUM: CPT | Performed by: HOSPITALIST

## 2021-01-01 PROCEDURE — 87070 CULTURE OTHR SPECIMN AEROBIC: CPT | Performed by: HOSPITALIST

## 2021-01-01 PROCEDURE — 81001 URINALYSIS AUTO W/SCOPE: CPT | Performed by: EMERGENCY MEDICINE

## 2021-01-01 PROCEDURE — 87040 BLOOD CULTURE FOR BACTERIA: CPT | Performed by: INTERNAL MEDICINE

## 2021-01-01 PROCEDURE — 85610 PROTHROMBIN TIME: CPT | Performed by: EMERGENCY MEDICINE

## 2021-01-01 PROCEDURE — 87205 SMEAR GRAM STAIN: CPT | Performed by: INTERNAL MEDICINE

## 2021-01-01 PROCEDURE — 87205 SMEAR GRAM STAIN: CPT | Performed by: HOSPITALIST

## 2021-01-01 PROCEDURE — 88112 CYTOPATH CELL ENHANCE TECH: CPT | Mod: 26 | Performed by: PATHOLOGY

## 2021-01-01 PROCEDURE — 82248 BILIRUBIN DIRECT: CPT | Performed by: EMERGENCY MEDICINE

## 2021-01-01 PROCEDURE — 83735 ASSAY OF MAGNESIUM: CPT | Performed by: HOSPITALIST

## 2021-01-01 PROCEDURE — G0463 HOSPITAL OUTPT CLINIC VISIT: HCPCS

## 2021-01-01 PROCEDURE — 76705 ECHO EXAM OF ABDOMEN: CPT

## 2021-01-01 PROCEDURE — 71045 X-RAY EXAM CHEST 1 VIEW: CPT

## 2021-01-01 PROCEDURE — 86850 RBC ANTIBODY SCREEN: CPT | Performed by: INTERNAL MEDICINE

## 2021-01-01 PROCEDURE — 36416 COLLJ CAPILLARY BLOOD SPEC: CPT | Performed by: INTERNAL MEDICINE

## 2021-01-01 PROCEDURE — 87070 CULTURE OTHR SPECIMN AEROBIC: CPT | Performed by: INTERNAL MEDICINE

## 2021-01-01 PROCEDURE — 80048 BASIC METABOLIC PNL TOTAL CA: CPT | Performed by: HOSPITALIST

## 2021-01-01 PROCEDURE — 84100 ASSAY OF PHOSPHORUS: CPT | Performed by: HOSPITALIST

## 2021-01-01 PROCEDURE — 87015 SPECIMEN INFECT AGNT CONCNTJ: CPT | Performed by: HOSPITALIST

## 2021-01-01 PROCEDURE — 250N000009 HC RX 250

## 2021-01-01 PROCEDURE — 250N000013 HC RX MED GY IP 250 OP 250 PS 637: Performed by: CLINICAL NURSE SPECIALIST

## 2021-01-01 PROCEDURE — 82042 OTHER SOURCE ALBUMIN QUAN EA: CPT | Performed by: HOSPITALIST

## 2021-01-01 PROCEDURE — 97530 THERAPEUTIC ACTIVITIES: CPT | Mod: GO

## 2021-01-01 PROCEDURE — 87075 CULTR BACTERIA EXCEPT BLOOD: CPT | Performed by: INTERNAL MEDICINE

## 2021-01-01 PROCEDURE — 88305 TISSUE EXAM BY PATHOLOGIST: CPT | Mod: TC | Performed by: HOSPITALIST

## 2021-01-01 PROCEDURE — 86704 HEP B CORE ANTIBODY TOTAL: CPT | Performed by: HOSPITALIST

## 2021-01-01 PROCEDURE — 89051 BODY FLUID CELL COUNT: CPT | Performed by: HOSPITALIST

## 2021-01-01 PROCEDURE — 85025 COMPLETE CBC W/AUTO DIFF WBC: CPT | Performed by: EMERGENCY MEDICINE

## 2021-01-01 PROCEDURE — 83605 ASSAY OF LACTIC ACID: CPT | Performed by: HOSPITALIST

## 2021-01-01 PROCEDURE — 70450 CT HEAD/BRAIN W/O DYE: CPT | Mod: MG

## 2021-01-01 PROCEDURE — 999N001018 HC STATISTIC H-CELL BLOCK W/STAIN: Performed by: HOSPITALIST

## 2021-01-01 PROCEDURE — 99356 PR PROLONGED SERV,INPATIENT,1ST HR: CPT | Performed by: CLINICAL NURSE SPECIALIST

## 2021-01-01 PROCEDURE — 250N000009 HC RX 250: Performed by: CLINICAL NURSE SPECIALIST

## 2021-01-01 PROCEDURE — 36600 WITHDRAWAL OF ARTERIAL BLOOD: CPT

## 2021-01-01 PROCEDURE — 82570 ASSAY OF URINE CREATININE: CPT | Performed by: EMERGENCY MEDICINE

## 2021-01-01 PROCEDURE — 82140 ASSAY OF AMMONIA: CPT | Performed by: EMERGENCY MEDICINE

## 2021-01-01 PROCEDURE — 99239 HOSP IP/OBS DSCHRG MGMT >30: CPT | Performed by: INTERNAL MEDICINE

## 2021-01-01 PROCEDURE — 250N000012 HC RX MED GY IP 250 OP 636 PS 637: Performed by: INTERNAL MEDICINE

## 2021-01-01 PROCEDURE — 84295 ASSAY OF SERUM SODIUM: CPT | Performed by: INTERNAL MEDICINE

## 2021-01-01 PROCEDURE — 80053 COMPREHEN METABOLIC PANEL: CPT | Performed by: EMERGENCY MEDICINE

## 2021-01-01 PROCEDURE — 36415 COLL VENOUS BLD VENIPUNCTURE: CPT | Performed by: RADIOLOGY

## 2021-01-01 PROCEDURE — 96360 HYDRATION IV INFUSION INIT: CPT

## 2021-01-01 PROCEDURE — 99292 CRITICAL CARE ADDL 30 MIN: CPT

## 2021-01-01 PROCEDURE — 85027 COMPLETE CBC AUTOMATED: CPT | Performed by: HOSPITALIST

## 2021-01-01 PROCEDURE — 250N000011 HC RX IP 250 OP 636: Performed by: CLINICAL NURSE SPECIALIST

## 2021-01-01 PROCEDURE — 84157 ASSAY OF PROTEIN OTHER: CPT | Performed by: HOSPITALIST

## 2021-01-01 PROCEDURE — 93005 ELECTROCARDIOGRAM TRACING: CPT

## 2021-01-01 PROCEDURE — 97110 THERAPEUTIC EXERCISES: CPT | Mod: GP

## 2021-01-01 PROCEDURE — 84145 PROCALCITONIN (PCT): CPT | Performed by: INTERNAL MEDICINE

## 2021-01-01 PROCEDURE — 87635 SARS-COV-2 COVID-19 AMP PRB: CPT | Performed by: EMERGENCY MEDICINE

## 2021-01-01 PROCEDURE — 250N000009 HC RX 250: Performed by: HOSPITALIST

## 2021-01-01 PROCEDURE — 84300 ASSAY OF URINE SODIUM: CPT | Performed by: EMERGENCY MEDICINE

## 2021-01-01 PROCEDURE — 86900 BLOOD TYPING SEROLOGIC ABO: CPT | Performed by: INTERNAL MEDICINE

## 2021-01-01 PROCEDURE — 82570 ASSAY OF URINE CREATININE: CPT | Performed by: INTERNAL MEDICINE

## 2021-01-01 PROCEDURE — 97166 OT EVAL MOD COMPLEX 45 MIN: CPT | Mod: GO

## 2021-01-01 RX ORDER — PROCHLORPERAZINE MALEATE 5 MG
5 TABLET ORAL EVERY 6 HOURS PRN
Status: DISCONTINUED | OUTPATIENT
Start: 2021-01-01 | End: 2021-01-22 | Stop reason: HOSPADM

## 2021-01-01 RX ORDER — LACTULOSE 10 G/15ML
10 SOLUTION ORAL 3 TIMES DAILY
Status: DISCONTINUED | OUTPATIENT
Start: 2021-01-01 | End: 2021-01-01

## 2021-01-01 RX ORDER — POTASSIUM CHLORIDE 1.5 G/1.58G
20 POWDER, FOR SOLUTION ORAL 2 TIMES DAILY
Status: COMPLETED | OUTPATIENT
Start: 2021-01-01 | End: 2021-01-01

## 2021-01-01 RX ORDER — POLYETHYLENE GLYCOL 3350 17 G/17G
17 POWDER, FOR SOLUTION ORAL 2 TIMES DAILY PRN
Status: DISCONTINUED | OUTPATIENT
Start: 2021-01-01 | End: 2021-01-01

## 2021-01-01 RX ORDER — SALIVA STIMULANT COMB. NO.3
2 SPRAY, NON-AEROSOL (ML) MUCOUS MEMBRANE
Status: DISCONTINUED | OUTPATIENT
Start: 2021-01-01 | End: 2021-01-22 | Stop reason: HOSPADM

## 2021-01-01 RX ORDER — LORAZEPAM 2 MG/ML
1-2 INJECTION INTRAMUSCULAR EVERY 30 MIN PRN
Status: DISCONTINUED | OUTPATIENT
Start: 2021-01-01 | End: 2021-01-01

## 2021-01-01 RX ORDER — ALBUMIN (HUMAN) 12.5 G/50ML
25 SOLUTION INTRAVENOUS 2 TIMES DAILY
Status: DISCONTINUED | OUTPATIENT
Start: 2021-01-01 | End: 2021-01-01

## 2021-01-01 RX ORDER — LACTULOSE 10 G/15ML
20 SOLUTION ORAL 4 TIMES DAILY
Status: DISCONTINUED | OUTPATIENT
Start: 2021-01-01 | End: 2021-01-01

## 2021-01-01 RX ORDER — LIDOCAINE 40 MG/G
CREAM TOPICAL
Status: DISCONTINUED | OUTPATIENT
Start: 2021-01-01 | End: 2021-01-01

## 2021-01-01 RX ORDER — ALBUMIN (HUMAN) 12.5 G/50ML
25 SOLUTION INTRAVENOUS EVERY 8 HOURS
Status: DISCONTINUED | OUTPATIENT
Start: 2021-01-01 | End: 2021-01-01

## 2021-01-01 RX ORDER — SODIUM CHLORIDE 9 MG/ML
INJECTION, SOLUTION INTRAVENOUS CONTINUOUS
Status: DISCONTINUED | OUTPATIENT
Start: 2021-01-01 | End: 2021-01-01

## 2021-01-01 RX ORDER — B COMPLEX C NO.10/FOLIC ACID 900MCG/5ML
5 LIQUID (ML) ORAL DAILY
Status: DISCONTINUED | OUTPATIENT
Start: 2021-01-01 | End: 2021-01-01

## 2021-01-01 RX ORDER — HYDROMORPHONE HYDROCHLORIDE 1 MG/ML
0.5 INJECTION, SOLUTION INTRAMUSCULAR; INTRAVENOUS; SUBCUTANEOUS
Status: DISCONTINUED | OUTPATIENT
Start: 2021-01-01 | End: 2021-01-01

## 2021-01-01 RX ORDER — OCTREOTIDE ACETATE 100 UG/ML
100 INJECTION, SOLUTION INTRAVENOUS; SUBCUTANEOUS 3 TIMES DAILY
Status: DISCONTINUED | OUTPATIENT
Start: 2021-01-01 | End: 2021-01-01

## 2021-01-01 RX ORDER — LANOLIN ALCOHOL/MO/W.PET/CERES
100 CREAM (GRAM) TOPICAL DAILY
Status: DISCONTINUED | OUTPATIENT
Start: 2021-01-01 | End: 2021-01-01

## 2021-01-01 RX ORDER — NALOXONE HYDROCHLORIDE 0.4 MG/ML
0.4 INJECTION, SOLUTION INTRAMUSCULAR; INTRAVENOUS; SUBCUTANEOUS
Status: DISCONTINUED | OUTPATIENT
Start: 2021-01-01 | End: 2021-01-22 | Stop reason: HOSPADM

## 2021-01-01 RX ORDER — LANOLIN ALCOHOL/MO/W.PET/CERES
100 CREAM (GRAM) TOPICAL 3 TIMES DAILY
Status: COMPLETED | OUTPATIENT
Start: 2021-01-01 | End: 2021-01-01

## 2021-01-01 RX ORDER — SCOLOPAMINE TRANSDERMAL SYSTEM 1 MG/1
1 PATCH, EXTENDED RELEASE TRANSDERMAL
Status: DISCONTINUED | OUTPATIENT
Start: 2021-01-01 | End: 2021-01-22 | Stop reason: HOSPADM

## 2021-01-01 RX ORDER — LIDOCAINE HYDROCHLORIDE 10 MG/ML
10 INJECTION, SOLUTION EPIDURAL; INFILTRATION; INTRACAUDAL; PERINEURAL ONCE
Status: COMPLETED | OUTPATIENT
Start: 2021-01-01 | End: 2021-01-01

## 2021-01-01 RX ORDER — NALOXONE HYDROCHLORIDE 0.4 MG/ML
0.2 INJECTION, SOLUTION INTRAMUSCULAR; INTRAVENOUS; SUBCUTANEOUS
Status: DISCONTINUED | OUTPATIENT
Start: 2021-01-01 | End: 2021-01-22 | Stop reason: HOSPADM

## 2021-01-01 RX ORDER — MIDODRINE HYDROCHLORIDE 5 MG/1
10 TABLET ORAL
Status: DISCONTINUED | OUTPATIENT
Start: 2021-01-01 | End: 2021-01-01

## 2021-01-01 RX ORDER — SALIVA STIMULANT COMB. NO.3
2 SPRAY, NON-AEROSOL (ML) MUCOUS MEMBRANE 4 TIMES DAILY
Status: DISCONTINUED | OUTPATIENT
Start: 2021-01-01 | End: 2021-01-01

## 2021-01-01 RX ORDER — ACETAMINOPHEN 500 MG
500 TABLET ORAL ONCE
Status: COMPLETED | OUTPATIENT
Start: 2021-01-01 | End: 2021-01-01

## 2021-01-01 RX ORDER — FAMOTIDINE 20 MG/1
20 TABLET, FILM COATED ORAL 2 TIMES DAILY
Status: DISCONTINUED | OUTPATIENT
Start: 2021-01-01 | End: 2021-01-01

## 2021-01-01 RX ORDER — DEXTROSE MONOHYDRATE 100 MG/ML
INJECTION, SOLUTION INTRAVENOUS CONTINUOUS PRN
Status: DISCONTINUED | OUTPATIENT
Start: 2021-01-01 | End: 2021-01-01

## 2021-01-01 RX ORDER — GLYCOPYRROLATE 0.2 MG/ML
0.4 INJECTION INTRAMUSCULAR; INTRAVENOUS EVERY 4 HOURS
Status: DISCONTINUED | OUTPATIENT
Start: 2021-01-01 | End: 2021-01-22 | Stop reason: HOSPADM

## 2021-01-01 RX ORDER — LORAZEPAM 1 MG/1
1-2 TABLET ORAL EVERY 30 MIN PRN
Status: DISCONTINUED | OUTPATIENT
Start: 2021-01-01 | End: 2021-01-01

## 2021-01-01 RX ORDER — LIDOCAINE HYDROCHLORIDE 20 MG/ML
JELLY TOPICAL ONCE
Status: COMPLETED | OUTPATIENT
Start: 2021-01-01 | End: 2021-01-01

## 2021-01-01 RX ORDER — SODIUM CHLORIDE 9 MG/ML
INJECTION, SOLUTION INTRAVENOUS CONTINUOUS
Status: ACTIVE | OUTPATIENT
Start: 2021-01-01 | End: 2021-01-01

## 2021-01-01 RX ORDER — OLANZAPINE 5 MG/1
5-10 TABLET, ORALLY DISINTEGRATING ORAL EVERY 6 HOURS PRN
Status: DISCONTINUED | OUTPATIENT
Start: 2021-01-01 | End: 2021-01-01

## 2021-01-01 RX ORDER — BISACODYL 10 MG
10 SUPPOSITORY, RECTAL RECTAL ONCE
Status: DISCONTINUED | OUTPATIENT
Start: 2021-01-01 | End: 2021-01-01

## 2021-01-01 RX ORDER — CEFTRIAXONE 1 G/1
1 INJECTION, POWDER, FOR SOLUTION INTRAMUSCULAR; INTRAVENOUS EVERY 24 HOURS
Status: DISCONTINUED | OUTPATIENT
Start: 2021-01-01 | End: 2021-01-01

## 2021-01-01 RX ORDER — PREDNISONE 20 MG/1
40 TABLET ORAL DAILY
Status: DISCONTINUED | OUTPATIENT
Start: 2021-01-01 | End: 2021-01-01

## 2021-01-01 RX ORDER — LIDOCAINE HYDROCHLORIDE 20 MG/ML
JELLY TOPICAL
Status: COMPLETED
Start: 2021-01-01 | End: 2021-01-01

## 2021-01-01 RX ORDER — DEXTROSE MONOHYDRATE 25 G/50ML
25-50 INJECTION, SOLUTION INTRAVENOUS
Status: DISCONTINUED | OUTPATIENT
Start: 2021-01-01 | End: 2021-01-22 | Stop reason: HOSPADM

## 2021-01-01 RX ORDER — HYDROMORPHONE HYDROCHLORIDE 1 MG/ML
.5-1 INJECTION, SOLUTION INTRAMUSCULAR; INTRAVENOUS; SUBCUTANEOUS
Status: DISCONTINUED | OUTPATIENT
Start: 2021-01-01 | End: 2021-01-22 | Stop reason: HOSPADM

## 2021-01-01 RX ORDER — NICOTINE POLACRILEX 4 MG
15-30 LOZENGE BUCCAL
Status: DISCONTINUED | OUTPATIENT
Start: 2021-01-01 | End: 2021-01-22 | Stop reason: HOSPADM

## 2021-01-01 RX ORDER — ONDANSETRON 4 MG/1
4 TABLET, ORALLY DISINTEGRATING ORAL EVERY 6 HOURS PRN
Status: DISCONTINUED | OUTPATIENT
Start: 2021-01-01 | End: 2021-01-01

## 2021-01-01 RX ORDER — POTASSIUM CHLORIDE 1500 MG/1
20 TABLET, EXTENDED RELEASE ORAL ONCE
Status: COMPLETED | OUTPATIENT
Start: 2021-01-01 | End: 2021-01-01

## 2021-01-01 RX ORDER — LACTULOSE 10 G/15ML
10 SOLUTION ORAL 2 TIMES DAILY
Status: DISCONTINUED | OUTPATIENT
Start: 2021-01-01 | End: 2021-01-01

## 2021-01-01 RX ORDER — ALBUMIN (HUMAN) 12.5 G/50ML
25 SOLUTION INTRAVENOUS DAILY
Status: DISCONTINUED | OUTPATIENT
Start: 2021-01-01 | End: 2021-01-01

## 2021-01-01 RX ORDER — LACTULOSE 10 G/15ML
10 SOLUTION ORAL ONCE
Status: COMPLETED | OUTPATIENT
Start: 2021-01-01 | End: 2021-01-01

## 2021-01-01 RX ORDER — MORPHINE SULFATE 2 MG/ML
2 INJECTION, SOLUTION INTRAMUSCULAR; INTRAVENOUS
Status: DISCONTINUED | OUTPATIENT
Start: 2021-01-01 | End: 2021-01-01

## 2021-01-01 RX ORDER — ONDANSETRON 2 MG/ML
4 INJECTION INTRAMUSCULAR; INTRAVENOUS EVERY 6 HOURS PRN
Status: DISCONTINUED | OUTPATIENT
Start: 2021-01-01 | End: 2021-01-22 | Stop reason: HOSPADM

## 2021-01-01 RX ORDER — BISACODYL 10 MG
10 SUPPOSITORY, RECTAL RECTAL DAILY PRN
Status: DISCONTINUED | OUTPATIENT
Start: 2021-01-01 | End: 2021-01-22 | Stop reason: HOSPADM

## 2021-01-01 RX ORDER — HALOPERIDOL 5 MG/ML
2.5-5 INJECTION INTRAMUSCULAR EVERY 6 HOURS PRN
Status: DISCONTINUED | OUTPATIENT
Start: 2021-01-01 | End: 2021-01-01

## 2021-01-01 RX ORDER — OCTREOTIDE ACETATE 100 UG/ML
100 INJECTION, SOLUTION INTRAVENOUS; SUBCUTANEOUS EVERY 8 HOURS
Status: DISCONTINUED | OUTPATIENT
Start: 2021-01-01 | End: 2021-01-01

## 2021-01-01 RX ORDER — LANOLIN ALCOHOL/MO/W.PET/CERES
200 CREAM (GRAM) TOPICAL 3 TIMES DAILY
Status: COMPLETED | OUTPATIENT
Start: 2021-01-01 | End: 2021-01-01

## 2021-01-01 RX ORDER — OCTREOTIDE ACETATE 100 UG/ML
200 INJECTION, SOLUTION INTRAVENOUS; SUBCUTANEOUS EVERY 8 HOURS
Status: DISCONTINUED | OUTPATIENT
Start: 2021-01-01 | End: 2021-01-01

## 2021-01-01 RX ORDER — FLUMAZENIL 0.1 MG/ML
0.2 INJECTION, SOLUTION INTRAVENOUS
Status: DISCONTINUED | OUTPATIENT
Start: 2021-01-01 | End: 2021-01-22 | Stop reason: HOSPADM

## 2021-01-01 RX ORDER — PREDNISOLONE SODIUM PHOSPHATE 10 MG/1
40 TABLET, ORALLY DISINTEGRATING ORAL DAILY
Status: DISCONTINUED | OUTPATIENT
Start: 2021-01-01 | End: 2021-01-01

## 2021-01-01 RX ORDER — NICOTINE POLACRILEX 4 MG
15-30 LOZENGE BUCCAL
Status: DISCONTINUED | OUTPATIENT
Start: 2021-01-01 | End: 2021-01-01

## 2021-01-01 RX ORDER — FOLIC ACID 1 MG/1
1 TABLET ORAL DAILY
Status: DISCONTINUED | OUTPATIENT
Start: 2021-01-01 | End: 2021-01-01

## 2021-01-01 RX ORDER — ALBUMIN (HUMAN) 12.5 G/50ML
50 SOLUTION INTRAVENOUS 2 TIMES DAILY
Status: COMPLETED | OUTPATIENT
Start: 2021-01-01 | End: 2021-01-01

## 2021-01-01 RX ORDER — FAMOTIDINE 10 MG
10 TABLET ORAL 2 TIMES DAILY
Status: DISCONTINUED | OUTPATIENT
Start: 2021-01-01 | End: 2021-01-01

## 2021-01-01 RX ORDER — AMOXICILLIN 250 MG
1 CAPSULE ORAL 2 TIMES DAILY PRN
Status: DISCONTINUED | OUTPATIENT
Start: 2021-01-01 | End: 2021-01-01

## 2021-01-01 RX ORDER — LACTULOSE 10 G/15ML
20 SOLUTION ORAL 3 TIMES DAILY
Status: DISCONTINUED | OUTPATIENT
Start: 2021-01-01 | End: 2021-01-01

## 2021-01-01 RX ORDER — LANOLIN ALCOHOL/MO/W.PET/CERES
3 CREAM (GRAM) TOPICAL
Status: DISCONTINUED | OUTPATIENT
Start: 2021-01-01 | End: 2021-01-01

## 2021-01-01 RX ORDER — AMOXICILLIN 250 MG
2 CAPSULE ORAL 2 TIMES DAILY PRN
Status: DISCONTINUED | OUTPATIENT
Start: 2021-01-01 | End: 2021-01-01

## 2021-01-01 RX ORDER — PANTOPRAZOLE SODIUM 40 MG/1
40 TABLET, DELAYED RELEASE ORAL
Status: DISCONTINUED | OUTPATIENT
Start: 2021-01-01 | End: 2021-01-01

## 2021-01-01 RX ORDER — PROCHLORPERAZINE 25 MG
12.5 SUPPOSITORY, RECTAL RECTAL EVERY 12 HOURS PRN
Status: DISCONTINUED | OUTPATIENT
Start: 2021-01-01 | End: 2021-01-22 | Stop reason: HOSPADM

## 2021-01-01 RX ORDER — DEXTROSE MONOHYDRATE 25 G/50ML
25-50 INJECTION, SOLUTION INTRAVENOUS
Status: DISCONTINUED | OUTPATIENT
Start: 2021-01-01 | End: 2021-01-01

## 2021-01-01 RX ORDER — OCTREOTIDE ACETATE 200 UG/ML
200 INJECTION INTRAVENOUS EVERY 8 HOURS
Status: DISCONTINUED | OUTPATIENT
Start: 2021-01-01 | End: 2021-01-01 | Stop reason: ALTCHOICE

## 2021-01-01 RX ORDER — MULTIPLE VITAMINS W/ MINERALS TAB 9MG-400MCG
1 TAB ORAL DAILY
Status: DISCONTINUED | OUTPATIENT
Start: 2021-01-01 | End: 2021-01-01

## 2021-01-01 RX ORDER — PREDNISOLONE 5 MG/1
40 TABLET ORAL DAILY
Status: DISCONTINUED | OUTPATIENT
Start: 2021-01-01 | End: 2021-01-01

## 2021-01-01 RX ORDER — ALBUMIN (HUMAN) 12.5 G/50ML
50 SOLUTION INTRAVENOUS ONCE
Status: COMPLETED | OUTPATIENT
Start: 2021-01-01 | End: 2021-01-01

## 2021-01-01 RX ADMIN — TAZOBACTAM SODIUM AND PIPERACILLIN SODIUM 2.25 G: 250; 2 INJECTION, SOLUTION INTRAVENOUS at 09:32

## 2021-01-01 RX ADMIN — TAZOBACTAM SODIUM AND PIPERACILLIN SODIUM 2.25 G: 250; 2 INJECTION, SOLUTION INTRAVENOUS at 22:02

## 2021-01-01 RX ADMIN — FOLIC ACID 1 MG: 1 TABLET ORAL at 10:45

## 2021-01-01 RX ADMIN — THIAMINE HCL TAB 100 MG 200 MG: 100 TAB at 21:38

## 2021-01-01 RX ADMIN — RIFAXIMIN 550 MG: 550 TABLET ORAL at 22:02

## 2021-01-01 RX ADMIN — MIDODRINE HYDROCHLORIDE 10 MG: 5 TABLET ORAL at 13:30

## 2021-01-01 RX ADMIN — ALBUMIN HUMAN 25 G: 0.25 SOLUTION INTRAVENOUS at 17:03

## 2021-01-01 RX ADMIN — PANTOPRAZOLE SODIUM 40 MG: 40 TABLET, DELAYED RELEASE ORAL at 09:08

## 2021-01-01 RX ADMIN — Medication 12.5 MG: at 03:47

## 2021-01-01 RX ADMIN — OCTREOTIDE ACETATE 100 MCG: 100 INJECTION, SOLUTION INTRAVENOUS; SUBCUTANEOUS at 17:22

## 2021-01-01 RX ADMIN — OCTREOTIDE ACETATE 100 MCG: 100 INJECTION, SOLUTION INTRAVENOUS; SUBCUTANEOUS at 22:10

## 2021-01-01 RX ADMIN — SODIUM CHLORIDE 1000 ML: 9 INJECTION, SOLUTION INTRAVENOUS at 12:32

## 2021-01-01 RX ADMIN — THIAMINE HCL TAB 100 MG 200 MG: 100 TAB at 16:15

## 2021-01-01 RX ADMIN — PANTOPRAZOLE SODIUM 40 MG: 40 TABLET, DELAYED RELEASE ORAL at 09:04

## 2021-01-01 RX ADMIN — MIDODRINE HYDROCHLORIDE 10 MG: 5 TABLET ORAL at 19:03

## 2021-01-01 RX ADMIN — RIFAXIMIN 550 MG: 550 TABLET ORAL at 08:30

## 2021-01-01 RX ADMIN — LACTULOSE 10 G: 20 SOLUTION ORAL at 13:05

## 2021-01-01 RX ADMIN — RIFAXIMIN 550 MG: 550 TABLET ORAL at 09:04

## 2021-01-01 RX ADMIN — LACTULOSE 20 G: 20 SOLUTION ORAL at 21:28

## 2021-01-01 RX ADMIN — TAZOBACTAM SODIUM AND PIPERACILLIN SODIUM 2.25 G: 250; 2 INJECTION, SOLUTION INTRAVENOUS at 09:36

## 2021-01-01 RX ADMIN — OCTREOTIDE ACETATE 100 MCG: 100 INJECTION, SOLUTION INTRAVENOUS; SUBCUTANEOUS at 09:27

## 2021-01-01 RX ADMIN — MIDODRINE HYDROCHLORIDE 10 MG: 5 TABLET ORAL at 08:05

## 2021-01-01 RX ADMIN — OCTREOTIDE ACETATE 200 MCG: 100 INJECTION, SOLUTION INTRAVENOUS; SUBCUTANEOUS at 11:18

## 2021-01-01 RX ADMIN — FOLIC ACID 1 MG: 1 TABLET ORAL at 08:56

## 2021-01-01 RX ADMIN — LACTULOSE 20 G: 20 SOLUTION ORAL at 08:06

## 2021-01-01 RX ADMIN — THIAMINE HCL TAB 100 MG 200 MG: 100 TAB at 16:20

## 2021-01-01 RX ADMIN — MIDODRINE HYDROCHLORIDE 10 MG: 5 TABLET ORAL at 10:45

## 2021-01-01 RX ADMIN — FOLIC ACID 1 MG: 1 TABLET ORAL at 21:06

## 2021-01-01 RX ADMIN — FAMOTIDINE 10 MG: 10 TABLET, FILM COATED ORAL at 08:01

## 2021-01-01 RX ADMIN — PREDNISONE 40 MG: 20 TABLET ORAL at 11:44

## 2021-01-01 RX ADMIN — LACTULOSE 10 G: 20 SOLUTION ORAL at 21:40

## 2021-01-01 RX ADMIN — DIATRIZOATE MEGLUMINE AND DIATRIZOATE SODIUM 10 ML: 660; 100 SOLUTION ORAL; RECTAL at 14:29

## 2021-01-01 RX ADMIN — SODIUM CHLORIDE 500 ML: 9 INJECTION, SOLUTION INTRAVENOUS at 13:30

## 2021-01-01 RX ADMIN — ALBUMIN HUMAN 25 G: 0.25 SOLUTION INTRAVENOUS at 09:43

## 2021-01-01 RX ADMIN — THIAMINE HCL TAB 100 MG 100 MG: 100 TAB at 08:12

## 2021-01-01 RX ADMIN — RIFAXIMIN 550 MG: 550 TABLET ORAL at 08:05

## 2021-01-01 RX ADMIN — ALBUMIN HUMAN 25 G: 0.25 SOLUTION INTRAVENOUS at 17:01

## 2021-01-01 RX ADMIN — RIFAXIMIN 550 MG: 550 TABLET ORAL at 21:28

## 2021-01-01 RX ADMIN — ALBUMIN HUMAN 25 G: 0.25 SOLUTION INTRAVENOUS at 10:22

## 2021-01-01 RX ADMIN — THIAMINE HCL TAB 100 MG 100 MG: 100 TAB at 21:43

## 2021-01-01 RX ADMIN — SODIUM CHLORIDE 250 ML: 9 INJECTION, SOLUTION INTRAVENOUS at 19:14

## 2021-01-01 RX ADMIN — Medication 2 SPRAY: at 18:49

## 2021-01-01 RX ADMIN — MULTIPLE VITAMINS W/ MINERALS TAB 1 TABLET: TAB at 10:03

## 2021-01-01 RX ADMIN — TAZOBACTAM SODIUM AND PIPERACILLIN SODIUM 2.25 G: 250; 2 INJECTION, SOLUTION INTRAVENOUS at 22:05

## 2021-01-01 RX ADMIN — LACTULOSE 20 G: 20 SOLUTION ORAL at 16:37

## 2021-01-01 RX ADMIN — GLYCOPYRROLATE 0.4 MG: 0.2 INJECTION, SOLUTION INTRAMUSCULAR; INTRAVENOUS at 21:11

## 2021-01-01 RX ADMIN — ALBUMIN HUMAN 25 G: 0.25 SOLUTION INTRAVENOUS at 00:14

## 2021-01-01 RX ADMIN — INSULIN ASPART 1 UNITS: 100 INJECTION, SOLUTION INTRAVENOUS; SUBCUTANEOUS at 12:41

## 2021-01-01 RX ADMIN — LIDOCAINE HYDROCHLORIDE 10 ML: 10 INJECTION, SOLUTION EPIDURAL; INFILTRATION; INTRACAUDAL; PERINEURAL at 10:06

## 2021-01-01 RX ADMIN — LACTULOSE 20 G: 20 SOLUTION ORAL at 18:25

## 2021-01-01 RX ADMIN — OCTREOTIDE ACETATE 100 MCG: 100 INJECTION, SOLUTION INTRAVENOUS; SUBCUTANEOUS at 22:05

## 2021-01-01 RX ADMIN — THIAMINE HCL TAB 100 MG 200 MG: 100 TAB at 08:01

## 2021-01-01 RX ADMIN — RIFAXIMIN 550 MG: 550 TABLET ORAL at 21:02

## 2021-01-01 RX ADMIN — MULTIPLE VITAMINS W/ MINERALS TAB 1 TABLET: TAB at 10:11

## 2021-01-01 RX ADMIN — CEFTRIAXONE 1 G: 1 INJECTION, POWDER, FOR SOLUTION INTRAMUSCULAR; INTRAVENOUS at 13:36

## 2021-01-01 RX ADMIN — ACETAMINOPHEN 500 MG: 500 TABLET, FILM COATED ORAL at 12:41

## 2021-01-01 RX ADMIN — ALBUMIN HUMAN 50 G: 0.25 SOLUTION INTRAVENOUS at 22:02

## 2021-01-01 RX ADMIN — THIAMINE HCL TAB 100 MG 100 MG: 100 TAB at 22:23

## 2021-01-01 RX ADMIN — LACTULOSE 20 G: 20 SOLUTION ORAL at 11:40

## 2021-01-01 RX ADMIN — ALBUMIN HUMAN 50 G: 0.25 SOLUTION INTRAVENOUS at 15:01

## 2021-01-01 RX ADMIN — MULTIPLE VITAMINS W/ MINERALS TAB 1 TABLET: TAB at 09:09

## 2021-01-01 RX ADMIN — LACTULOSE 10 G: 20 SOLUTION ORAL at 10:45

## 2021-01-01 RX ADMIN — MULTIPLE VITAMINS W/ MINERALS TAB 1 TABLET: TAB at 10:46

## 2021-01-01 RX ADMIN — LACTULOSE 20 G: 20 SOLUTION ORAL at 08:30

## 2021-01-01 RX ADMIN — MORPHINE SULFATE 2 MG: 2 INJECTION, SOLUTION INTRAMUSCULAR; INTRAVENOUS at 10:02

## 2021-01-01 RX ADMIN — MIDODRINE HYDROCHLORIDE 10 MG: 5 TABLET ORAL at 18:32

## 2021-01-01 RX ADMIN — TAZOBACTAM SODIUM AND PIPERACILLIN SODIUM 2.25 G: 250; 2 INJECTION, SOLUTION INTRAVENOUS at 15:56

## 2021-01-01 RX ADMIN — PHYTONADIONE 10 MG: 10 INJECTION, EMULSION INTRAMUSCULAR; INTRAVENOUS; SUBCUTANEOUS at 13:54

## 2021-01-01 RX ADMIN — THIAMINE HCL TAB 100 MG 100 MG: 100 TAB at 09:29

## 2021-01-01 RX ADMIN — RIFAXIMIN 550 MG: 550 TABLET ORAL at 20:44

## 2021-01-01 RX ADMIN — ALBUMIN HUMAN 25 G: 0.25 SOLUTION INTRAVENOUS at 21:34

## 2021-01-01 RX ADMIN — MORPHINE SULFATE 2 MG: 2 INJECTION, SOLUTION INTRAMUSCULAR; INTRAVENOUS at 13:52

## 2021-01-01 RX ADMIN — POTASSIUM CHLORIDE 20 MEQ: 1.5 POWDER, FOR SOLUTION ORAL at 12:17

## 2021-01-01 RX ADMIN — MIDODRINE HYDROCHLORIDE 10 MG: 5 TABLET ORAL at 13:06

## 2021-01-01 RX ADMIN — POTASSIUM CHLORIDE 20 MEQ: 1.5 POWDER, FOR SOLUTION ORAL at 21:36

## 2021-01-01 RX ADMIN — OCTREOTIDE ACETATE 200 MCG: 100 INJECTION, SOLUTION INTRAVENOUS; SUBCUTANEOUS at 10:03

## 2021-01-01 RX ADMIN — INSULIN ASPART 1 UNITS: 100 INJECTION, SOLUTION INTRAVENOUS; SUBCUTANEOUS at 08:12

## 2021-01-01 RX ADMIN — ALBUMIN HUMAN 25 G: 0.25 SOLUTION INTRAVENOUS at 01:11

## 2021-01-01 RX ADMIN — FOLIC ACID 1 MG: 1 TABLET ORAL at 08:06

## 2021-01-01 RX ADMIN — OCTREOTIDE ACETATE 200 MCG: 100 INJECTION, SOLUTION INTRAVENOUS; SUBCUTANEOUS at 16:02

## 2021-01-01 RX ADMIN — THIAMINE HCL TAB 100 MG 100 MG: 100 TAB at 09:08

## 2021-01-01 RX ADMIN — OCTREOTIDE ACETATE 200 MCG: 100 INJECTION, SOLUTION INTRAVENOUS; SUBCUTANEOUS at 09:17

## 2021-01-01 RX ADMIN — ALBUMIN HUMAN 25 G: 0.25 SOLUTION INTRAVENOUS at 00:12

## 2021-01-01 RX ADMIN — SODIUM CHLORIDE 1000 ML: 9 INJECTION, SOLUTION INTRAVENOUS at 13:12

## 2021-01-01 RX ADMIN — LIDOCAINE HYDROCHLORIDE 2 TUBE: 20 JELLY TOPICAL at 14:28

## 2021-01-01 RX ADMIN — PANTOPRAZOLE SODIUM 40 MG: 40 TABLET, DELAYED RELEASE ORAL at 06:59

## 2021-01-01 RX ADMIN — MULTIPLE VITAMINS W/ MINERALS TAB 1 TABLET: TAB at 21:06

## 2021-01-01 RX ADMIN — PANTOPRAZOLE SODIUM 40 MG: 40 TABLET, DELAYED RELEASE ORAL at 09:58

## 2021-01-01 RX ADMIN — THIAMINE HCL TAB 100 MG 100 MG: 100 TAB at 10:03

## 2021-01-01 RX ADMIN — CEFTRIAXONE 1 G: 1 INJECTION, POWDER, FOR SOLUTION INTRAMUSCULAR; INTRAVENOUS at 13:08

## 2021-01-01 RX ADMIN — MIDODRINE HYDROCHLORIDE 10 MG: 5 TABLET ORAL at 08:30

## 2021-01-01 RX ADMIN — ALBUMIN HUMAN 25 G: 0.25 SOLUTION INTRAVENOUS at 08:33

## 2021-01-01 RX ADMIN — MIDODRINE HYDROCHLORIDE 10 MG: 5 TABLET ORAL at 14:53

## 2021-01-01 RX ADMIN — RIFAXIMIN 550 MG: 550 TABLET ORAL at 10:46

## 2021-01-01 RX ADMIN — RIFAXIMIN 550 MG: 550 TABLET ORAL at 10:12

## 2021-01-01 RX ADMIN — PANTOPRAZOLE SODIUM 40 MG: 40 TABLET, DELAYED RELEASE ORAL at 06:12

## 2021-01-01 RX ADMIN — Medication 2 SPRAY: at 20:39

## 2021-01-01 RX ADMIN — PANTOPRAZOLE SODIUM 40 MG: 40 TABLET, DELAYED RELEASE ORAL at 06:07

## 2021-01-01 RX ADMIN — LACTULOSE 10 G: 20 SOLUTION ORAL at 16:04

## 2021-01-01 RX ADMIN — TAZOBACTAM SODIUM AND PIPERACILLIN SODIUM 2.25 G: 250; 2 INJECTION, SOLUTION INTRAVENOUS at 10:54

## 2021-01-01 RX ADMIN — LACTULOSE 20 G: 20 SOLUTION ORAL at 21:43

## 2021-01-01 RX ADMIN — TAZOBACTAM SODIUM AND PIPERACILLIN SODIUM 2.25 G: 250; 2 INJECTION, SOLUTION INTRAVENOUS at 21:23

## 2021-01-01 RX ADMIN — SODIUM BICARBONATE: 84 INJECTION, SOLUTION INTRAVENOUS at 21:22

## 2021-01-01 RX ADMIN — PANTOPRAZOLE SODIUM 40 MG: 40 TABLET, DELAYED RELEASE ORAL at 06:48

## 2021-01-01 RX ADMIN — Medication 12.5 MG: at 16:44

## 2021-01-01 RX ADMIN — PREDNISOLONE SODIUM PHOSPHATE 40 MG: 10 TABLET, ORALLY DISINTEGRATING ORAL at 09:29

## 2021-01-01 RX ADMIN — ALBUMIN HUMAN 25 G: 0.25 SOLUTION INTRAVENOUS at 17:26

## 2021-01-01 RX ADMIN — LACTULOSE 20 G: 20 SOLUTION ORAL at 22:27

## 2021-01-01 RX ADMIN — HYDROMORPHONE HYDROCHLORIDE 0.5 MG: 1 INJECTION, SOLUTION INTRAMUSCULAR; INTRAVENOUS; SUBCUTANEOUS at 20:31

## 2021-01-01 RX ADMIN — OCTREOTIDE ACETATE 100 MCG: 100 INJECTION, SOLUTION INTRAVENOUS; SUBCUTANEOUS at 17:05

## 2021-01-01 RX ADMIN — THIAMINE HCL TAB 100 MG 100 MG: 100 TAB at 11:56

## 2021-01-01 RX ADMIN — OCTREOTIDE ACETATE 200 MCG: 100 INJECTION, SOLUTION INTRAVENOUS; SUBCUTANEOUS at 00:21

## 2021-01-01 RX ADMIN — LACTULOSE 10 G: 20 SOLUTION ORAL at 20:51

## 2021-01-01 RX ADMIN — ALBUMIN HUMAN 25 G: 0.25 SOLUTION INTRAVENOUS at 10:51

## 2021-01-01 RX ADMIN — PANTOPRAZOLE SODIUM 40 MG: 40 TABLET, DELAYED RELEASE ORAL at 09:30

## 2021-01-01 RX ADMIN — LACTULOSE 20 G: 20 SOLUTION ORAL at 12:28

## 2021-01-01 RX ADMIN — SODIUM CHLORIDE: 9 INJECTION, SOLUTION INTRAVENOUS at 00:18

## 2021-01-01 RX ADMIN — OCTREOTIDE ACETATE 100 MCG: 100 INJECTION, SOLUTION INTRAVENOUS; SUBCUTANEOUS at 10:00

## 2021-01-01 RX ADMIN — LACTULOSE 20 G: 20 SOLUTION ORAL at 09:09

## 2021-01-01 RX ADMIN — LACTULOSE 10 G: 20 SOLUTION ORAL at 16:32

## 2021-01-01 RX ADMIN — PANTOPRAZOLE SODIUM 40 MG: 40 TABLET, DELAYED RELEASE ORAL at 08:30

## 2021-01-01 RX ADMIN — FOLIC ACID 1 MG: 1 TABLET ORAL at 09:08

## 2021-01-01 RX ADMIN — THIAMINE HCL TAB 100 MG 100 MG: 100 TAB at 08:38

## 2021-01-01 RX ADMIN — MIDODRINE HYDROCHLORIDE 10 MG: 5 TABLET ORAL at 12:32

## 2021-01-01 RX ADMIN — OCTREOTIDE ACETATE 200 MCG: 100 INJECTION, SOLUTION INTRAVENOUS; SUBCUTANEOUS at 16:52

## 2021-01-01 RX ADMIN — MIDODRINE HYDROCHLORIDE 10 MG: 5 TABLET ORAL at 12:28

## 2021-01-01 RX ADMIN — TAZOBACTAM SODIUM AND PIPERACILLIN SODIUM 2.25 G: 250; 2 INJECTION, SOLUTION INTRAVENOUS at 09:10

## 2021-01-01 RX ADMIN — FAMOTIDINE 10 MG: 10 TABLET, FILM COATED ORAL at 20:46

## 2021-01-01 RX ADMIN — PANTOPRAZOLE SODIUM 40 MG: 40 TABLET, DELAYED RELEASE ORAL at 06:30

## 2021-01-01 RX ADMIN — PANTOPRAZOLE SODIUM 40 MG: 40 TABLET, DELAYED RELEASE ORAL at 06:37

## 2021-01-01 RX ADMIN — LACTULOSE 10 G: 20 SOLUTION ORAL at 10:03

## 2021-01-01 RX ADMIN — LACTULOSE 10 G: 20 SOLUTION ORAL at 21:36

## 2021-01-01 RX ADMIN — OCTREOTIDE ACETATE 200 MCG: 100 INJECTION, SOLUTION INTRAVENOUS; SUBCUTANEOUS at 16:09

## 2021-01-01 RX ADMIN — MIDODRINE HYDROCHLORIDE 10 MG: 5 TABLET ORAL at 17:09

## 2021-01-01 RX ADMIN — MIDODRINE HYDROCHLORIDE 10 MG: 5 TABLET ORAL at 09:08

## 2021-01-01 RX ADMIN — CEFTRIAXONE 1 G: 1 INJECTION, POWDER, FOR SOLUTION INTRAMUSCULAR; INTRAVENOUS at 13:11

## 2021-01-01 RX ADMIN — LACTULOSE 20 G: 20 SOLUTION ORAL at 15:55

## 2021-01-01 RX ADMIN — LACTULOSE 20 G: 20 SOLUTION ORAL at 21:58

## 2021-01-01 RX ADMIN — CEFTRIAXONE 1 G: 1 INJECTION, POWDER, FOR SOLUTION INTRAMUSCULAR; INTRAVENOUS at 13:37

## 2021-01-01 RX ADMIN — THIAMINE HCL TAB 100 MG 100 MG: 100 TAB at 16:03

## 2021-01-01 RX ADMIN — INSULIN ASPART 1 UNITS: 100 INJECTION, SOLUTION INTRAVENOUS; SUBCUTANEOUS at 00:23

## 2021-01-01 RX ADMIN — THIAMINE HCL TAB 100 MG 100 MG: 100 TAB at 22:02

## 2021-01-01 RX ADMIN — LIDOCAINE HYDROCHLORIDE 15 ML: 10 INJECTION, SOLUTION EPIDURAL; INFILTRATION; INTRACAUDAL; PERINEURAL at 09:40

## 2021-01-01 RX ADMIN — MELATONIN TAB 3 MG 3 MG: 3 TAB at 22:28

## 2021-01-01 RX ADMIN — LACTULOSE 20 G: 20 SOLUTION ORAL at 18:29

## 2021-01-01 RX ADMIN — ALBUMIN HUMAN 25 G: 0.25 SOLUTION INTRAVENOUS at 08:19

## 2021-01-01 RX ADMIN — MELATONIN TAB 3 MG 3 MG: 3 TAB at 21:57

## 2021-01-01 RX ADMIN — FOLIC ACID 1 MG: 1 TABLET ORAL at 09:04

## 2021-01-01 RX ADMIN — TAZOBACTAM SODIUM AND PIPERACILLIN SODIUM 2.25 G: 250; 2 INJECTION, SOLUTION INTRAVENOUS at 16:56

## 2021-01-01 RX ADMIN — GLYCOPYRROLATE 0.4 MG: 0.2 INJECTION, SOLUTION INTRAMUSCULAR; INTRAVENOUS at 17:07

## 2021-01-01 RX ADMIN — THIAMINE HCL TAB 100 MG 100 MG: 100 TAB at 17:23

## 2021-01-01 RX ADMIN — RIFAXIMIN 550 MG: 550 TABLET ORAL at 09:29

## 2021-01-01 RX ADMIN — LACTULOSE 20 G: 20 SOLUTION ORAL at 13:50

## 2021-01-01 RX ADMIN — LACTULOSE 20 G: 20 SOLUTION ORAL at 21:57

## 2021-01-01 RX ADMIN — ALBUMIN HUMAN 25 G: 0.25 SOLUTION INTRAVENOUS at 17:35

## 2021-01-01 RX ADMIN — PANTOPRAZOLE SODIUM 40 MG: 40 TABLET, DELAYED RELEASE ORAL at 11:57

## 2021-01-01 RX ADMIN — PREDNISONE 40 MG: 20 TABLET ORAL at 08:00

## 2021-01-01 RX ADMIN — OCTREOTIDE ACETATE 200 MCG: 100 INJECTION, SOLUTION INTRAVENOUS; SUBCUTANEOUS at 00:06

## 2021-01-01 RX ADMIN — CEFTRIAXONE 1 G: 1 INJECTION, POWDER, FOR SOLUTION INTRAMUSCULAR; INTRAVENOUS at 12:26

## 2021-01-01 RX ADMIN — LACTULOSE 20 G: 20 SOLUTION ORAL at 17:35

## 2021-01-01 RX ADMIN — OCTREOTIDE ACETATE 200 MCG: 100 INJECTION, SOLUTION INTRAVENOUS; SUBCUTANEOUS at 00:20

## 2021-01-01 RX ADMIN — RIFAXIMIN 550 MG: 550 TABLET ORAL at 11:54

## 2021-01-01 RX ADMIN — LACTULOSE 20 G: 20 SOLUTION ORAL at 13:30

## 2021-01-01 RX ADMIN — MULTIPLE VITAMINS W/ MINERALS TAB 1 TABLET: TAB at 09:08

## 2021-01-01 RX ADMIN — LACTULOSE 20 G: 20 SOLUTION ORAL at 12:33

## 2021-01-01 RX ADMIN — FOLIC ACID 1 MG: 1 TABLET ORAL at 10:03

## 2021-01-01 RX ADMIN — POTASSIUM CHLORIDE 20 MEQ: 1500 TABLET, EXTENDED RELEASE ORAL at 08:06

## 2021-01-01 RX ADMIN — ALBUMIN HUMAN 25 G: 0.25 SOLUTION INTRAVENOUS at 20:44

## 2021-01-01 RX ADMIN — FOLIC ACID 1 MG: 1 TABLET ORAL at 08:38

## 2021-01-01 RX ADMIN — MIDODRINE HYDROCHLORIDE 10 MG: 5 TABLET ORAL at 19:14

## 2021-01-01 RX ADMIN — ALBUMIN HUMAN 25 G: 0.25 SOLUTION INTRAVENOUS at 09:19

## 2021-01-01 RX ADMIN — LACTULOSE 20 G: 20 SOLUTION ORAL at 16:09

## 2021-01-01 RX ADMIN — MIDODRINE HYDROCHLORIDE 10 MG: 5 TABLET ORAL at 12:36

## 2021-01-01 RX ADMIN — TAZOBACTAM SODIUM AND PIPERACILLIN SODIUM 2.25 G: 250; 2 INJECTION, SOLUTION INTRAVENOUS at 16:41

## 2021-01-01 RX ADMIN — LACTULOSE 20 G: 20 SOLUTION ORAL at 22:48

## 2021-01-01 RX ADMIN — RIFAXIMIN 550 MG: 550 TABLET ORAL at 21:36

## 2021-01-01 RX ADMIN — OCTREOTIDE ACETATE 100 MCG: 100 INJECTION, SOLUTION INTRAVENOUS; SUBCUTANEOUS at 00:56

## 2021-01-01 RX ADMIN — THIAMINE HCL TAB 100 MG 100 MG: 100 TAB at 21:57

## 2021-01-01 RX ADMIN — LACTULOSE 20 G: 20 SOLUTION ORAL at 08:12

## 2021-01-01 RX ADMIN — THIAMINE HCL TAB 100 MG 100 MG: 100 TAB at 10:46

## 2021-01-01 RX ADMIN — PREDNISONE 40 MG: 20 TABLET ORAL at 08:38

## 2021-01-01 RX ADMIN — OCTREOTIDE ACETATE 100 MCG: 100 INJECTION, SOLUTION INTRAVENOUS; SUBCUTANEOUS at 16:56

## 2021-01-01 RX ADMIN — LORAZEPAM 1 MG: 1 TABLET ORAL at 22:28

## 2021-01-01 RX ADMIN — RIFAXIMIN 550 MG: 550 TABLET ORAL at 11:56

## 2021-01-01 RX ADMIN — RIFAXIMIN 550 MG: 550 TABLET ORAL at 22:04

## 2021-01-01 RX ADMIN — MIDODRINE HYDROCHLORIDE 10 MG: 5 TABLET ORAL at 18:25

## 2021-01-01 RX ADMIN — MULTIPLE VITAMINS W/ MINERALS TAB 1 TABLET: TAB at 08:30

## 2021-01-01 RX ADMIN — THIAMINE HCL TAB 100 MG 100 MG: 100 TAB at 21:28

## 2021-01-01 RX ADMIN — LACTULOSE 10 G: 20 SOLUTION ORAL at 21:28

## 2021-01-01 RX ADMIN — ALBUMIN HUMAN 25 G: 0.25 SOLUTION INTRAVENOUS at 09:06

## 2021-01-01 RX ADMIN — MIDODRINE HYDROCHLORIDE 10 MG: 5 TABLET ORAL at 11:57

## 2021-01-01 RX ADMIN — RIFAXIMIN 550 MG: 550 TABLET ORAL at 09:09

## 2021-01-01 RX ADMIN — THIAMINE HCL TAB 100 MG 100 MG: 100 TAB at 09:09

## 2021-01-01 RX ADMIN — PREDNISONE 40 MG: 20 TABLET ORAL at 09:46

## 2021-01-01 RX ADMIN — SODIUM CHLORIDE: 9 INJECTION, SOLUTION INTRAVENOUS at 16:18

## 2021-01-01 RX ADMIN — RIFAXIMIN 550 MG: 550 TABLET ORAL at 21:57

## 2021-01-01 RX ADMIN — TAZOBACTAM SODIUM AND PIPERACILLIN SODIUM 2.25 G: 250; 2 INJECTION, SOLUTION INTRAVENOUS at 15:55

## 2021-01-01 RX ADMIN — MIDODRINE HYDROCHLORIDE 10 MG: 5 TABLET ORAL at 13:08

## 2021-01-01 RX ADMIN — MULTIPLE VITAMINS W/ MINERALS TAB 1 TABLET: TAB at 09:04

## 2021-01-01 RX ADMIN — MULTIPLE VITAMINS W/ MINERALS TAB 1 TABLET: TAB at 08:38

## 2021-01-01 RX ADMIN — MIDODRINE HYDROCHLORIDE 10 MG: 5 TABLET ORAL at 09:29

## 2021-01-01 RX ADMIN — ALBUMIN HUMAN 25 G: 0.25 SOLUTION INTRAVENOUS at 08:21

## 2021-01-01 RX ADMIN — FOLIC ACID 1 MG: 1 TABLET ORAL at 08:00

## 2021-01-01 RX ADMIN — SCOPALAMINE 1 PATCH: 1 PATCH, EXTENDED RELEASE TRANSDERMAL at 10:36

## 2021-01-01 RX ADMIN — THIAMINE HCL TAB 100 MG 100 MG: 100 TAB at 09:46

## 2021-01-01 RX ADMIN — SODIUM BICARBONATE: 84 INJECTION, SOLUTION INTRAVENOUS at 12:28

## 2021-01-01 RX ADMIN — FOLIC ACID 1 MG: 1 TABLET ORAL at 12:04

## 2021-01-01 RX ADMIN — THIAMINE HCL TAB 100 MG 100 MG: 100 TAB at 10:30

## 2021-01-01 RX ADMIN — CEFTRIAXONE 1 G: 1 INJECTION, POWDER, FOR SOLUTION INTRAMUSCULAR; INTRAVENOUS at 13:09

## 2021-01-01 RX ADMIN — TAZOBACTAM SODIUM AND PIPERACILLIN SODIUM 2.25 G: 250; 2 INJECTION, SOLUTION INTRAVENOUS at 02:57

## 2021-01-01 RX ADMIN — ALBUMIN HUMAN 25 G: 0.25 SOLUTION INTRAVENOUS at 21:01

## 2021-01-01 RX ADMIN — MIDODRINE HYDROCHLORIDE 10 MG: 5 TABLET ORAL at 09:09

## 2021-01-01 RX ADMIN — TAZOBACTAM SODIUM AND PIPERACILLIN SODIUM 2.25 G: 250; 2 INJECTION, SOLUTION INTRAVENOUS at 16:17

## 2021-01-01 RX ADMIN — MULTIPLE VITAMINS W/ MINERALS TAB 1 TABLET: TAB at 08:00

## 2021-01-01 RX ADMIN — SODIUM BICARBONATE: 84 INJECTION, SOLUTION INTRAVENOUS at 01:41

## 2021-01-01 RX ADMIN — FOLIC ACID 1 MG: 1 TABLET ORAL at 09:09

## 2021-01-01 RX ADMIN — LACTULOSE 10 G: 20 SOLUTION ORAL at 12:28

## 2021-01-01 RX ADMIN — MIDODRINE HYDROCHLORIDE 10 MG: 5 TABLET ORAL at 09:04

## 2021-01-01 RX ADMIN — OCTREOTIDE ACETATE 100 MCG: 100 INJECTION, SOLUTION INTRAVENOUS; SUBCUTANEOUS at 08:07

## 2021-01-01 RX ADMIN — PREDNISOLONE SODIUM PHOSPHATE 40 MG: 10 TABLET, ORALLY DISINTEGRATING ORAL at 10:11

## 2021-01-01 RX ADMIN — MIDODRINE HYDROCHLORIDE 10 MG: 5 TABLET ORAL at 12:17

## 2021-01-01 RX ADMIN — MIDODRINE HYDROCHLORIDE 10 MG: 5 TABLET ORAL at 17:36

## 2021-01-01 RX ADMIN — THIAMINE HCL TAB 100 MG 100 MG: 100 TAB at 10:11

## 2021-01-01 RX ADMIN — RIFAXIMIN 550 MG: 550 TABLET ORAL at 10:31

## 2021-01-01 RX ADMIN — LACTULOSE 20 G: 20 SOLUTION ORAL at 22:04

## 2021-01-01 RX ADMIN — TAZOBACTAM SODIUM AND PIPERACILLIN SODIUM 2.25 G: 250; 2 INJECTION, SOLUTION INTRAVENOUS at 03:26

## 2021-01-01 RX ADMIN — OCTREOTIDE ACETATE 200 MCG: 100 INJECTION, SOLUTION INTRAVENOUS; SUBCUTANEOUS at 01:12

## 2021-01-01 RX ADMIN — ALBUMIN HUMAN 25 G: 0.25 SOLUTION INTRAVENOUS at 09:20

## 2021-01-01 RX ADMIN — FOLIC ACID 1 MG: 1 TABLET ORAL at 08:30

## 2021-01-01 RX ADMIN — LACTULOSE 20 G: 20 SOLUTION ORAL at 19:14

## 2021-01-01 RX ADMIN — THIAMINE HCL TAB 100 MG 100 MG: 100 TAB at 16:32

## 2021-01-01 RX ADMIN — LACTULOSE 20 G: 20 SOLUTION ORAL at 09:04

## 2021-01-01 RX ADMIN — RIFAXIMIN 550 MG: 550 TABLET ORAL at 22:09

## 2021-01-01 RX ADMIN — MIDODRINE HYDROCHLORIDE 10 MG: 5 TABLET ORAL at 08:12

## 2021-01-01 RX ADMIN — TAZOBACTAM SODIUM AND PIPERACILLIN SODIUM 2.25 G: 250; 2 INJECTION, SOLUTION INTRAVENOUS at 22:10

## 2021-01-01 RX ADMIN — THIAMINE HCL TAB 100 MG 200 MG: 100 TAB at 08:56

## 2021-01-01 RX ADMIN — MULTIPLE VITAMINS W/ MINERALS TAB 1 TABLET: TAB at 09:46

## 2021-01-01 RX ADMIN — THIAMINE HCL TAB 100 MG 200 MG: 100 TAB at 21:06

## 2021-01-01 RX ADMIN — ALBUMIN HUMAN 25 G: 0.25 SOLUTION INTRAVENOUS at 10:14

## 2021-01-01 RX ADMIN — SODIUM CHLORIDE: 9 INJECTION, SOLUTION INTRAVENOUS at 12:19

## 2021-01-01 RX ADMIN — TAZOBACTAM SODIUM AND PIPERACILLIN SODIUM 2.25 G: 250; 2 INJECTION, SOLUTION INTRAVENOUS at 11:37

## 2021-01-01 RX ADMIN — HYDROMORPHONE HYDROCHLORIDE 0.5 MG: 1 INJECTION, SOLUTION INTRAMUSCULAR; INTRAVENOUS; SUBCUTANEOUS at 17:20

## 2021-01-01 RX ADMIN — RIFAXIMIN 550 MG: 550 TABLET ORAL at 20:38

## 2021-01-01 RX ADMIN — LIDOCAINE HYDROCHLORIDE 10 ML: 10 INJECTION, SOLUTION EPIDURAL; INFILTRATION; INTRACAUDAL; PERINEURAL at 14:57

## 2021-01-01 RX ADMIN — FOLIC ACID 1 MG: 1 TABLET ORAL at 09:30

## 2021-01-01 RX ADMIN — LACTULOSE 10 G: 20 SOLUTION ORAL at 16:52

## 2021-01-01 RX ADMIN — CEFTRIAXONE 1 G: 1 INJECTION, POWDER, FOR SOLUTION INTRAMUSCULAR; INTRAVENOUS at 12:36

## 2021-01-01 RX ADMIN — ALBUMIN HUMAN 25 G: 0.25 SOLUTION INTRAVENOUS at 21:36

## 2021-01-01 RX ADMIN — OCTREOTIDE ACETATE 100 MCG: 100 INJECTION, SOLUTION INTRAVENOUS; SUBCUTANEOUS at 21:33

## 2021-01-01 RX ADMIN — RIFAXIMIN 550 MG: 550 TABLET ORAL at 09:08

## 2021-01-01 RX ADMIN — LACTULOSE 10 G: 20 SOLUTION ORAL at 22:02

## 2021-01-01 RX ADMIN — TAZOBACTAM SODIUM AND PIPERACILLIN SODIUM 2.25 G: 250; 2 INJECTION, SOLUTION INTRAVENOUS at 03:47

## 2021-01-01 RX ADMIN — RIFAXIMIN 550 MG: 550 TABLET ORAL at 22:27

## 2021-01-01 RX ADMIN — MULTIPLE VITAMINS W/ MINERALS TAB 1 TABLET: TAB at 08:56

## 2021-01-01 RX ADMIN — INSULIN ASPART 1 UNITS: 100 INJECTION, SOLUTION INTRAVENOUS; SUBCUTANEOUS at 13:32

## 2021-01-01 RX ADMIN — THIAMINE HCL TAB 100 MG 100 MG: 100 TAB at 09:04

## 2021-01-01 RX ADMIN — PANTOPRAZOLE SODIUM 40 MG: 40 TABLET, DELAYED RELEASE ORAL at 08:12

## 2021-01-01 RX ADMIN — FOLIC ACID 1 MG: 1 TABLET ORAL at 10:30

## 2021-01-01 RX ADMIN — CEFTRIAXONE 1 G: 1 INJECTION, POWDER, FOR SOLUTION INTRAMUSCULAR; INTRAVENOUS at 13:05

## 2021-01-01 RX ADMIN — ALBUMIN HUMAN 25 G: 0.25 SOLUTION INTRAVENOUS at 10:00

## 2021-01-01 RX ADMIN — ALBUMIN HUMAN 50 G: 0.25 SOLUTION INTRAVENOUS at 17:34

## 2021-01-01 RX ADMIN — THIAMINE HCL TAB 100 MG 100 MG: 100 TAB at 08:30

## 2021-01-01 RX ADMIN — SODIUM BICARBONATE: 84 INJECTION, SOLUTION INTRAVENOUS at 14:26

## 2021-01-01 RX ADMIN — LACTULOSE 20 G: 20 SOLUTION ORAL at 22:10

## 2021-01-01 RX ADMIN — ALBUMIN HUMAN 25 G: 0.25 SOLUTION INTRAVENOUS at 00:24

## 2021-01-01 RX ADMIN — LACTULOSE 20 G: 20 SOLUTION ORAL at 16:02

## 2021-01-01 RX ADMIN — SODIUM BICARBONATE: 84 INJECTION, SOLUTION INTRAVENOUS at 04:30

## 2021-01-01 RX ADMIN — LACTULOSE 10 G: 20 SOLUTION ORAL at 08:38

## 2021-01-01 RX ADMIN — RIFAXIMIN 550 MG: 550 TABLET ORAL at 08:12

## 2021-01-01 RX ADMIN — ALBUMIN HUMAN 25 G: 0.25 SOLUTION INTRAVENOUS at 20:38

## 2021-01-01 RX ADMIN — THIAMINE HCL TAB 100 MG 100 MG: 100 TAB at 08:06

## 2021-01-01 RX ADMIN — PANTOPRAZOLE SODIUM 40 MG: 40 TABLET, DELAYED RELEASE ORAL at 06:35

## 2021-01-01 RX ADMIN — PANTOPRAZOLE SODIUM 40 MG: 40 TABLET, DELAYED RELEASE ORAL at 08:06

## 2021-01-01 RX ADMIN — SODIUM CHLORIDE 250 ML: 9 INJECTION, SOLUTION INTRAVENOUS at 16:18

## 2021-01-01 RX ADMIN — MIDODRINE HYDROCHLORIDE 10 MG: 5 TABLET ORAL at 17:23

## 2021-01-01 RX ADMIN — INSULIN ASPART 1 UNITS: 100 INJECTION, SOLUTION INTRAVENOUS; SUBCUTANEOUS at 09:57

## 2021-01-01 RX ADMIN — OCTREOTIDE ACETATE 100 MCG: 100 INJECTION, SOLUTION INTRAVENOUS; SUBCUTANEOUS at 16:55

## 2021-01-01 RX ADMIN — LACTULOSE 20 G: 20 SOLUTION ORAL at 16:44

## 2021-01-01 RX ADMIN — OCTREOTIDE ACETATE 100 MCG: 100 INJECTION, SOLUTION INTRAVENOUS; SUBCUTANEOUS at 22:09

## 2021-01-01 RX ADMIN — MULTIPLE VITAMINS W/ MINERALS TAB 1 TABLET: TAB at 09:29

## 2021-01-01 RX ADMIN — OCTREOTIDE ACETATE 200 MCG: 100 INJECTION, SOLUTION INTRAVENOUS; SUBCUTANEOUS at 09:42

## 2021-01-01 RX ADMIN — LACTULOSE 10 G: 20 SOLUTION ORAL at 17:23

## 2021-01-01 RX ADMIN — FOLIC ACID 1 MG: 1 TABLET ORAL at 10:11

## 2021-01-01 RX ADMIN — MIDODRINE HYDROCHLORIDE 10 MG: 5 TABLET ORAL at 17:38

## 2021-01-01 RX ADMIN — PANTOPRAZOLE SODIUM 40 MG: 40 TABLET, DELAYED RELEASE ORAL at 09:09

## 2021-01-01 RX ADMIN — PREDNISOLONE SODIUM PHOSPHATE 40 MG: 10 TABLET, ORALLY DISINTEGRATING ORAL at 10:27

## 2021-01-01 RX ADMIN — THIAMINE HCL TAB 100 MG 100 MG: 100 TAB at 16:38

## 2021-01-01 RX ADMIN — FOLIC ACID 1 MG: 1 TABLET ORAL at 09:46

## 2021-01-01 RX ADMIN — RIFAXIMIN 550 MG: 550 TABLET ORAL at 21:40

## 2021-01-01 RX ADMIN — MIDODRINE HYDROCHLORIDE 10 MG: 5 TABLET ORAL at 10:03

## 2021-01-01 RX ADMIN — LACTULOSE 20 G: 20 SOLUTION ORAL at 21:29

## 2021-01-01 RX ADMIN — MIDODRINE HYDROCHLORIDE 10 MG: 5 TABLET ORAL at 17:54

## 2021-01-01 RX ADMIN — LACTULOSE 20 G: 20 SOLUTION ORAL at 10:11

## 2021-01-01 RX ADMIN — TAZOBACTAM SODIUM AND PIPERACILLIN SODIUM 2.25 G: 250; 2 INJECTION, SOLUTION INTRAVENOUS at 03:46

## 2021-01-01 RX ADMIN — LACTULOSE 20 G: 20 SOLUTION ORAL at 17:38

## 2021-01-01 RX ADMIN — LACTULOSE 10 G: 20 SOLUTION ORAL at 09:29

## 2021-01-01 RX ADMIN — FOLIC ACID 1 MG: 1 TABLET ORAL at 09:29

## 2021-01-01 RX ADMIN — FOLIC ACID 1 MG: 1 TABLET ORAL at 08:12

## 2021-01-01 RX ADMIN — MIDODRINE HYDROCHLORIDE 10 MG: 5 TABLET ORAL at 18:19

## 2021-01-01 RX ADMIN — INSULIN ASPART 1 UNITS: 100 INJECTION, SOLUTION INTRAVENOUS; SUBCUTANEOUS at 04:16

## 2021-01-01 RX ADMIN — LACTULOSE 10 G: 20 SOLUTION ORAL at 09:28

## 2021-01-01 ASSESSMENT — ACTIVITIES OF DAILY LIVING (ADL)
ADLS_ACUITY_SCORE: 23
ADLS_ACUITY_SCORE: 23
ADLS_ACUITY_SCORE: 22
ADLS_ACUITY_SCORE: 23
ADLS_ACUITY_SCORE: 22
ADLS_ACUITY_SCORE: 19
ADLS_ACUITY_SCORE: 20
ADLS_ACUITY_SCORE: 23
ADLS_ACUITY_SCORE: 25
ADLS_ACUITY_SCORE: 20
ADLS_ACUITY_SCORE: 23
ADLS_ACUITY_SCORE: 21
ADLS_ACUITY_SCORE: 21
ADLS_ACUITY_SCORE: 25
ADLS_ACUITY_SCORE: 23
ADLS_ACUITY_SCORE: 24
ADLS_ACUITY_SCORE: 23
ADLS_ACUITY_SCORE: 23
ADLS_ACUITY_SCORE: 21
ADLS_ACUITY_SCORE: 21
ADLS_ACUITY_SCORE: 23
ADLS_ACUITY_SCORE: 17
ADLS_ACUITY_SCORE: 23
ADLS_ACUITY_SCORE: 21
ADLS_ACUITY_SCORE: 23
ADLS_ACUITY_SCORE: 21
ADLS_ACUITY_SCORE: 23
ADLS_ACUITY_SCORE: 25
ADLS_ACUITY_SCORE: 25
ADLS_ACUITY_SCORE: 17
ADLS_ACUITY_SCORE: 23
ADLS_ACUITY_SCORE: 22
ADLS_ACUITY_SCORE: 21
ADLS_ACUITY_SCORE: 23
ADLS_ACUITY_SCORE: 19
ADLS_ACUITY_SCORE: 23
ADLS_ACUITY_SCORE: 21
ADLS_ACUITY_SCORE: 23
ADLS_ACUITY_SCORE: 25
ADLS_ACUITY_SCORE: 21
ADLS_ACUITY_SCORE: 22
ADLS_ACUITY_SCORE: 25
ADLS_ACUITY_SCORE: 23
ADLS_ACUITY_SCORE: 21
ADLS_ACUITY_SCORE: 22
ADLS_ACUITY_SCORE: 23
ADLS_ACUITY_SCORE: 23
ADLS_ACUITY_SCORE: 21
ADLS_ACUITY_SCORE: 23
ADLS_ACUITY_SCORE: 25
ADLS_ACUITY_SCORE: 23
ADLS_ACUITY_SCORE: 24
ADLS_ACUITY_SCORE: 23
ADLS_ACUITY_SCORE: 21
ADLS_ACUITY_SCORE: 23
ADLS_ACUITY_SCORE: 25
ADLS_ACUITY_SCORE: 21
ADLS_ACUITY_SCORE: 21
ADLS_ACUITY_SCORE: 25
ADLS_ACUITY_SCORE: 21
ADLS_ACUITY_SCORE: 23
ADLS_ACUITY_SCORE: 22
ADLS_ACUITY_SCORE: 21
ADLS_ACUITY_SCORE: 21
ADLS_ACUITY_SCORE: 23
ADLS_ACUITY_SCORE: 22
ADLS_ACUITY_SCORE: 25
ADLS_ACUITY_SCORE: 23
ADLS_ACUITY_SCORE: 17
ADLS_ACUITY_SCORE: 23
ADLS_ACUITY_SCORE: 23
ADLS_ACUITY_SCORE: 22
ADLS_ACUITY_SCORE: 23
ADLS_ACUITY_SCORE: 22
ADLS_ACUITY_SCORE: 23
ADLS_ACUITY_SCORE: 25
ADLS_ACUITY_SCORE: 21
ADLS_ACUITY_SCORE: 23
ADLS_ACUITY_SCORE: 21
ADLS_ACUITY_SCORE: 23
ADLS_ACUITY_SCORE: 21
ADLS_ACUITY_SCORE: 21
ADLS_ACUITY_SCORE: 20
ADLS_ACUITY_SCORE: 17
ADLS_ACUITY_SCORE: 23
ADLS_ACUITY_SCORE: 21
ADLS_ACUITY_SCORE: 12
ADLS_ACUITY_SCORE: 23
ADLS_ACUITY_SCORE: 23

## 2021-01-01 ASSESSMENT — MIFFLIN-ST. JEOR
SCORE: 1640.39
SCORE: 1646.28
SCORE: 1653.09
SCORE: 1728.84
SCORE: 1637.21
SCORE: 1601.38
SCORE: 1651.73

## 2021-01-01 ASSESSMENT — ENCOUNTER SYMPTOMS
FEVER: 0
DYSURIA: 0
BLOOD IN STOOL: 0
WEAKNESS: 1
FATIGUE: 1
ANAL BLEEDING: 0
DIZZINESS: 1
SHORTNESS OF BREATH: 0
LIGHT-HEADEDNESS: 1
COUGH: 0

## 2021-01-03 PROBLEM — E80.6 HYPERBILIRUBINEMIA: Status: ACTIVE | Noted: 2021-01-01

## 2021-01-03 PROBLEM — M62.81 GENERALIZED MUSCLE WEAKNESS: Status: ACTIVE | Noted: 2021-01-01

## 2021-01-03 PROBLEM — E87.1 HYPONATREMIA: Status: ACTIVE | Noted: 2021-01-01

## 2021-01-03 PROBLEM — N28.9 RENAL INSUFFICIENCY: Status: ACTIVE | Noted: 2021-01-01

## 2021-01-03 PROBLEM — D64.9 ANEMIA, UNSPECIFIED TYPE: Status: ACTIVE | Noted: 2021-01-01

## 2021-01-03 PROBLEM — F10.10 ALCOHOL ABUSE: Status: ACTIVE | Noted: 2021-01-01

## 2021-01-03 NOTE — ED PROVIDER NOTES
"    History   Chief Complaint:  Altered Mental Status       The history is provided by the patient and a caregiver. The history is limited by a language barrier and the condition of the patient. A  was used.      Pramod Harris is a 65 year old male with history of type 2 diabetes, anemia, and alcohol abuse who presents with altered mental status. The patient notes that he has felt very weak and that when he walks he feels dizzy, \"like he was drunk\". He states that he had last drank around 5 days ago. He states that his urine is very yellow for about 2 weeks and that he noted his yellowing of the skin around a week ago. He denies headache, chest pain, nausea, emesis, diarrhea, fever, cough. His caregiver notes that he has a history of alcohol abuse and has noticed that he has had some ankle swelling. He denies black or bloody stools or noted bleeding.     He denies any trauma but his SO states that he fell over picking up food. She had surgery and so had not seen him for about 1 week. She noted today's findings yesterday when she saw him for the first time. When he was no better today she brought him to the ED. She states that there was no alcohol in the house so she does not believe that he drank this week.     Review of Systems   Unable to perform ROS: Mental status change   Constitutional: Positive for fatigue. Negative for fever.   Respiratory: Negative for cough and shortness of breath.    Cardiovascular: Positive for leg swelling. Negative for chest pain.   Gastrointestinal: Negative for anal bleeding and blood in stool.   Genitourinary: Negative for dysuria.        Urine appears yellow   Neurological: Positive for dizziness, weakness and light-headedness.         Allergies:  No known drug allergies     Medications:  Lexapro   Metformin   Omeprazole   Fluticasone propionate    Past Medical History:    Subdural hematoma  Anemia   Subarachnoid hemorrhage  GERD  Type 2 diabetes   Alcohol " abuse    Past Surgical History:    Tonsillectomy and adenoidectomy   EGD    Family History:    No past family history.     Social History:  Smoking status: No  Alcohol use: yes, but not currently  PCP: Pierce Govea, at [facility]  Presents to the ED with caregiver    Physical Exam     Patient Vitals for the past 24 hrs:   BP Temp Temp src Pulse Resp SpO2   01/03/21 1815 -- -- -- 77 15 100 %   01/03/21 1800 -- -- -- 78 14 98 %   01/03/21 1745 -- -- -- 79 14 99 %   01/03/21 1730 -- -- -- 78 14 100 %   01/03/21 1715 125/74 -- -- 79 9 100 %   01/03/21 1630 -- -- -- 75 13 100 %   01/03/21 1615 123/72 -- -- 76 11 100 %   01/03/21 1610 123/72 -- -- 78 15 100 %   01/03/21 1600 114/71 -- -- 81 13 100 %   01/03/21 1550 124/71 -- -- -- 16 99 %   01/03/21 1540 130/77 97.8  F (36.6  C) Oral -- -- 100 %       Physical Exam  HENT: No sign of trauma to head. TM's clear bilaterally.    Mouth/Throat: Oropharynx is without swelling or erythema. Oral mucosa dry.  Poor dental hygiene.    Eyes:Scleral icterus. PERRLA  Neck: Moving neck freely.   Cardiovascular: Normal rate, regular rhythm and intact distal pulses.    Pulmonary/Chest: Effort normal and breath sounds normal.   Abdominal: There is no tenderness. Caput medusae and hepatomegaly. Protuberant likely ascites.   Musculoskeletal:  No pretibial edema, minimal ankle edema.  No calf tenderness  Neurological:Alert answering questions appropriately but states December for year and 2000 or 21 for year. EOROM intact. No facial asymmetry. Light touch sensation intact throughout.   Upper and lower extremity strength intact throughout. Coordination normal. Finger-Nose-Finger without dysmetria  Skin: Skin is warm and dry. Juandice, hepatomegaly.  Psychiatric: Normal mood and affect.      Emergency Department Course     ECG:  ECG taken at 1552, ECG read at 1606  Sinus rhythm with premature atrial complexes with aberrant conduction   Junctional ST depression, probably normal   Borderline  ECG  Rate 78 bpm. OH interval 126 ms. QRS duration 84 ms. QT/QTc 418/476 ms. P-R-T axes 27 41 2.     Imaging:  Head CT:  1. Decreased size of left convexity subdural fluid collection, probably chronic. 2. Otherwise, no acute intracranial abnormality.    Reading per radiology    Abdomen US Limited:   1. Cirrhosis with large volume ascites. 2. Polypoid solid material within the gallbladder lumen presumably sludge. No gallbladder wall thickening or bile duct dilatation.     Reading per radiology     Laboratory:  CBC: WBC 11.3(H), HGB 11.0(L),    CMP: Glucose 110(H), (L), chloride 93(L), GFR 55(L), Creatinine: 1.34(H), calcium 7.9(L), bilirubin total 21.8 (H), Albumin 1.5 (L), Alk phos 180 (H)  INR: 2.61(H)   Ammonia: 25   Lipase: 706(H)   Alcohol ethyl: <0.01   Creatinine POCT: 1.5(H), GFR 47(L)  Glucose POCT: 122(H)    Bilirubin direct total: 14.4(H)   GGT: 255(H)    COVID-19 virus Swab PCR: negative   UA: pending  Urine culture: pending    Emergency Department Course:  Reviewed:  I reviewed the patient's nursing notes, vitals, past medical records, Care Everywhere.     Assessments:  1530 I performed an examination of the patient as noted above.     1638 I checked on and updated the patient regarding findings and plans for admission.     Consults:   1834   I spoke to Dr. Rahman of the hospitalist service who accepts the patient for admission.     Interventions:  1618  mL IV Bolus         Disposition:  The patient was admitted to the hospital under the care of Dr. Rahman.       Impression & Plan   Medical Decision Making:  Pramod Harris is a 65 year old male with a history of alcohol abuse who presents with weakness, change in his speech/confusion, and jaundice. The differential diagnosis included but was not limited to liver failure, hepatic encephalopathy, ICH, intoxication, occult infection, electrolyte abnormality, kidney failure. ED evaluation is as noted above and remarkable for a number of  lab abnormalities including hyponatremia, elevated bilirubin, anemia, renal insufficiency. Notably his head CT was negative for new ICH and ammonia level is normal. US confirmed cirrhosis and ascites but did not reveal obstruction. Dr. Rahman has accepted the patient for admission for ongoing evaluation, monitoring and management.         Covid-19  Pramod Harris was evaluated during a global COVID-19 pandemic, which necessitated consideration that the patient might be at risk for infection with the SARS-CoV-2 virus that causes COVID-19.   Applicable protocols for evaluation were followed during the patient's care.   COVID-19 was considered as part of the patient's evaluation. The plan for testing is:  a test was obtained during this visit.       Diagnosis:    ICD-10-CM    1. Hyponatremia  E87.1 Bilirubin direct     Bilirubin direct     GGT     GGT     CANCELED: Bilirubin Direct and Total     CANCELED: GGT   2. Hyperbilirubinemia  E80.6    3. Anemia, unspecified type  D64.9    4. Generalized muscle weakness  M62.81    5. Renal insufficiency  N28.9    6. Alcohol abuse  F10.10        Scribe Disclosure:  I, Alayna Robb, am serving as a scribe at 3:37 PM on 1/3/2021 to document services personally performed by Liana Tyson MD based on my observations and the provider's statements to me.             Liana Tyson MD  01/04/21 1476

## 2021-01-03 NOTE — ED NOTES
DATE:  1/3/2021   TIME OF RECEIPT FROM LAB:  4076  LAB TEST:  Pauline  LAB VALUE:  21.8  RESULTS GIVEN WITH READ-BACK TO (PROVIDER):  Liana Tyson*  TIME LAB VALUE REPORTED TO PROVIDER:   9442

## 2021-01-04 NOTE — PROGRESS NOTES
Patient tolerated paracentesis well by Dr Traylor.  5400 ml of cody fluid collected.  Samples obtained were brought to lab.  Bandage and glue clean dry and intact at time of transfer via cart to nursing unit.  Report called to bedside RN.  Cristiane Rico, TRICIA, BSN

## 2021-01-04 NOTE — CONSULTS
[facility]  [facility]  Gastroenterology Consultation    [facility]ramod Harris  [id number] [facility] [facility] [address]  65 year old male    Admission Date/Time: 1/3/2021  Primary Care Provider: Pierce Govea    We were asked to see the patient in consultation by Dr. Rahman for evaluation of abdominal distention.      HPI:  Pramod Harris is a 65 year old male with PMH including diabetes, alcohol use disorder, depression, and intracranial hemorrhage who was admitted 1/3/21 after presenting to the ED with abdominal distention. Patient developed multiple symptoms over the past 1 week including decreased oral intake, abdominal and lower extremity swelling, jaundice, and dark urine. He also apparently fell at home. History is limited from the patient. He reports new abdominal swelling starting about 4 days ago. He denies associated nausea, vomiting, abdominal pain, bowel changes, melena, rectal bleeding.    Patient found to have multiple lab abnormalities including hemoglobin 11.0, WBC 11.3, sodium 121, creatinine 1.34, calcium 7.9, , ALT 42, , total bilirubin 21.8 (direct 14.4), albumin 1.5, , lipase 706, INR 2.61. CT head negative for intracranial bleed. RUQ US showed cirrhosis with large volume ascites and polypoid material in the gallbladder without cholecystitis or biliary ductal dilation. Viral hepatitis labs ordered and pending. Paracentesis also pending.    Patient denies prior diagnosis of cirrhosis. Drinks alcohol regularly; states one 6-pack per months. States last drink was 2 weeks ago. Blood alcohol undetectable on presentation. It appears that patient has previously had elevated transaminases (AST > ALT) dating back to 2017 and did have elevated blood alcohol level in 2019.    ROS: A comprehensive ten point review of systems was negative aside from those in mentioned in the HPI.      MEDICATIONS: No current facility-administered medications on file  prior to encounter.        escitalopram (LEXAPRO) 10 MG tablet, Take 10-20 mg by mouth daily        metFORMIN (GLUCOPHAGE) 500 MG tablet, Take 500 mg by mouth 2 times daily (with meals)       omeprazole (PRILOSEC) 20 MG DR capsule, Take 20 mg by mouth 2 times daily        ALLERGIES: No Known Allergies    History reviewed. No pertinent past medical history.    Past Surgical History:   Procedure Laterality Date     COLONOSCOPY  11/06/2017    [facility]     ENT SURGERY      T&A as a child     ESOPHAGOSCOPY, GASTROSCOPY, DUODENOSCOPY (EGD), COMBINED  06/2017    [facility]       SOCIAL HISTORY:  Social History     Tobacco Use     Smoking status: Never Smoker     Smokeless tobacco: Never Used   Substance Use Topics     Alcohol use: Yes     Comment: 4-5 beers/ 2-3 times per week     Drug use: No       FAMILY HISTORY:  Family History   Problem Relation Age of Onset     Colon Cancer No family hx of        PHYSICAL EXAM:   /64   Pulse 82   Temp 95.9  F (35.5  C) (Axillary)   Resp 18   Wt 90.2 kg (198 lb 12.8 oz)   SpO2 99%   BMI 32.09 kg/m      Constitutional: No acute distress.  Head: Normocephalic, atraumatic.    Neck: Neck supple. No adenopathy.  Eyes: Scleral icterus.  ENT: Hearing adequate. Pharynx normal without erythema or exudate.  Cardiovascular: RRR.  Respiratory: Nonlabored.  Abdomen: Round/ascites. Nontender.  Neuro: CN II-XII grossly intact. No focal deficits. No asterixis.  Extremities: Bilateral lower extremity edema.  Skin: Jaundice.  Psychiatric: Slightly confused. Mood and affect normal.      ADDITIONAL COMMENTS:   I reviewed the patient's new clinical lab test results.   Recent Labs   Lab Test 01/04/21  0638 01/03/21  1544 06/27/19  2305 06/27/19  2104   WBC 12.0* 11.3*  --  4.2   HGB 10.8* 11.0* 13.5 13.9   * 106*  --  99    181  --  173   INR  --  2.61*  --  1.15*     Recent Labs   Lab Test 01/04/21  0638 01/04/21  0154 01/03/21  2218 01/03/21  1544 06/27/19  2104    * 122* 122* 121* 133   POTASSIUM 3.8  --   --  3.8 3.9   CHLORIDE 94  --   --  93* 100   CO2 21  --   --  22 24   BUN 32*  --   --  28 3*   CR 1.41*  --   --  1.34* 0.62*   ANIONGAP 9  --   --  6 9   OVIDIO 8.1*  --   --  7.9* 8.1*   GLC 90  --   --  110* 130*     Recent Labs   Lab Test 01/04/21  0638 01/03/21  2030 01/03/21  1544 10/24/17  1507   ALBUMIN 1.4*  --  1.5* 3.4   BILITOTAL 21.5*  --  21.8* 0.3   DBIL 14.8*  --  14.4*  --    ALT 43  --  42 71*   *  --  Canceled, Test credited 240*   ALKPHOS 170*  --  180* 88   PROTEIN  --  10*  --   --    LIPASE  --   --  706*  --          IMAGING/ENDOSCOPY    CT Head w/o contrast 1/3/21   - Decreased size of left convexity subdural fluid collection, probably chronic.   - Otherwise, no acute intracranial abnormality.    RUQ US 1/3/21   - Cirrhosis with large volume ascites.   - Polypoid solid material within the gallbladder lumen presumably sludge. No gallbladder wall thickening or bile duct dilatation.      CONSULTATION ASSESSMENT AND PLAN:    Pramod Harris is a 65-year-old male who was admitted 1/3/21 after presenting with multiple symptoms including abdominal distention, lower extremity edema, decreased oral intake, jaundice, and dark urine. Found to have multiple lab abnormalities (hyponatremia, hyperbilirubinemia, elevated creatinine, elevated INR) and US showed cirrhosis with large volume ascites.    1) Cirrhosis with ascites: Etiology unclear, but suspect alcohol. Elevated LFTs most likely represent acute alcohol hepatitis on chronic liver disease. MELD 32, MELD-Na 35, . Cirrhosis complicated by ascites, coagulopathy, mild HE. Also with YVONNE, low albumin. Paracentesis pending.        - Trend LFTs, INR, renal function.        - Follow up viral hepatitis panel.        - Diagnostic / therapeutic paracentesis today. Will follow up on fluid studies.        - Hold diuretics for now given hyponatremia, YVONNE. Eventually may add Lasix/Aldactone.         - Start prednisolone 40 mg daily for alcohol hepatitis.        - Low sodium, high protein diet.        - [facility] with [facility] as [facility].    2) Alcohol use disorder: Most likely underlying cause of liver disease. Patient not very forthcoming about alcohol use. Did have elevated transaminases (AST>ALT) dating back to 2017 which suggests alcohol. Blood alcohol undetectable on admission. Unclear if mild confusion is from withdrawal, HE, and/or metabolic.         - Thiamine/folate.         - Alcohol cessation.         - Monitor for s/sx withdrawal.    I discussed the patient plan with Dr. Stevenson. Thank you for asking us to participate in the care of this patient.    Misty Tan PA-C  [facility] ([facility])

## 2021-01-04 NOTE — PLAN OF CARE
Pt. Is alert/lethargic. Disoriented to situation. Skin is jaundice in color. Paracentesis done this shift. Dressing in place to R abdomen, changed once due to leaking. Regular diet. Incontinent of urine at times. CIWA scores 2,4,4. Up with A x 2 and gait belt. Will continue with POC.     Needed assistance with feeding lunch and dinner this evening.

## 2021-01-04 NOTE — PLAN OF CARE
Vitals: VSS. Afebrile. Denies pain.   Labs: Na: 122  Neuro: Disoriented x time. CIWA 3 and 1.   Respiratory: Lung sounds clear. Denies SOB and cough.   Cardiac: WNL. Apical pulse regular. No edema noted.   GI/: Rounded abdominal appearance. Yellow/brown urine.   Skin: Jaundiced. Scattered bruising.  LDAs: PIV to right arm SL   Diet: Regular   Activity: SBA   Plan: CIWAs. Paracentesis today. Continue with POC.
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
## 2021-01-04 NOTE — PROGRESS NOTES
[facility]  Hospitalist Progress Note  Jaden Hui MD, MD 01/04/2021  (Text Page)  Reason for Stay (Diagnosis): Acute alcoholic hepatitis         Assessment and Plan:      Summary of Stay: Pramod Harris is a 65 year old male with history of SAH, SDH, traumatic induced, alcohol misuse, abuse, diabetes mellitus, GERD, depression and was admitted on 1/3/2021 with increasing abdominal distention, jaundice and found with alcoholic cirrhosis    Problem List:   1. Alcohol liver disease  2. Acute alcohol hepatitis  3. Suspected EtOH liver cirrhosis  4. Alcohol abuse  5. Ascites status post paracentesis  6. History of traumatic SAH, SDH  7. Non-insulin-requiring diabetes mellitus  8. GERD  9. Hypovolemic hyponatremia  10. Macrocytic anemia    Continue inpatient care.  Remain at risk for clinical deterioration.  On CIWA benzodiazepine triggered protocol if with alcohol withdrawals as he remain high risk for this  Tolerated diagnostic and therapeutic paracentesis earlier  Started on corticosteroids with prednisone 40 c/o GI service  Serum sodium improving.  Not receiving IV fluids given significant amount of third spacing likely from liver failure and hypoalbuminemia  Monitor glucose levels, holding home regimen of Metformin might not be the best regimen for him given current kidney injury and liver failure  -Referral to chemical dependency  -Patient significant other updated and she provided me with information that our patient truly has some issues with alcoholism as patient was not very forthright with his EtOH intake account.    DVT Prophylaxis: Pneumatic Compression Devices  Code Status: Full Code  Discharge Dispo: Home  Estimated Disch Date / # of Days until Disch: Anticipating continuation of inpatient care at least for the next 2-3 more days        Interval History (Subjective):      Assume service care today.  Seen and examined.  Chart reviewed.  Case discussed with nursing  service.  Fammily updated over the phone.  Patient tolerated paracentesis with 5400 mL of cody fluid collected  Tolerating oral diet.  Not combative, not agitated.  Stable hemodynamics.  Not requiring oxygen supplementation.  Afebrile.     # Pain Assessment:  Current Pain Score 1/4/2021   Patient currently in pain? denies   Pain location -   Pain descriptors -   Pramod winslow pain level was assessed and he currently denies pain.                  Physical Exam:      Last Vital Signs:  /50   Pulse 83   Temp 95.9  F (35.5  C) (Axillary)   Resp 20   Wt 90.2 kg (198 lb 12.8 oz)   SpO2 100%   BMI 32.09 kg/m      I/O last 3 completed shifts:  In: -   Out: 300 [Urine:300]  Wt Readings from Last 1 Encounters:   01/04/21 90.2 kg (198 lb 12.8 oz)     Vitals:    01/03/21 2000 01/04/21 0632   Weight: 94.9 kg (209 lb 3.2 oz) 90.2 kg (198 lb 12.8 oz)       Constitutional: Awake, alert, cooperative, no apparent distress   Respiratory: Clear to auscultation bilaterally, no crackles or wheezing   Cardiovascular: Regular rate and rhythm, normal S1 and S2, and no murmur noted   Abdomen: Normal bowel sounds, mildly distended, no guarding   Skin:  Severe jaundice   Neuro: Alert and oriented x3, no weakness, spontaneous and coherent speech   Extremities: No edema, normal range of motion   Other(s): Euthymic mood, not agitated    Numerous skin excoriations on both upper and lower extremities   All other systems: Negative          Medications:      All current medications were reviewed with changes reflected in problem list.         Data:      All new lab and imaging data was reviewed.   Labs:  Recent Labs   Lab 01/04/21  1005   CULT PENDING     No results for input(s): PH, PHARTERIAL, PO2, [id number], SAT, PCO2, HCO3, BASEEXCESS, FRANCOIS, BEB in the last 168 hours.    Invalid input(s): [id number]  Recent Labs   Lab 01/04/21  0638 01/03/21  1544   WBC 12.0* 11.3*   HGB 10.8* 11.0*   HCT 28.9* 29.7*   * 106*    181      Recent Labs   Lab 01/04/21  0638 01/04/21  0154 01/03/21  2218 01/03/21  2004 01/03/21  1550 01/03/21  1544   * 122* 122*  --   --  121*   POTASSIUM 3.8  --   --   --   --  3.8   CHLORIDE 94  --   --   --   --  93*   CO2 21  --   --   --   --  22   ANIONGAP 9  --   --   --   --  6   GLC 90  --   --   --   --  110*   BUN 32*  --   --   --   --  28   CR 1.41*  --   --   --   --  1.34*   GFRESTIMATED 52*  --   --   --  47* 55*   GFRESTBLACK 60*  --   --   --  57* 64   OVIDIO 8.1*  --   --   --   --  7.9*   MAG  --   --   --  2.4*  --   --    PHOS  --   --   --  3.2  --   --    PROTTOTAL 7.1  --   --   --   --  7.5   ALBUMIN 1.4*  --   --   --   --  1.5*   BILITOTAL 21.5*  --   --   --   --  21.8*   ALKPHOS 170*  --   --   --   --  180*   *  --   --   --   --  Canceled, Test credited   ALT 43  --   --   --   --  42     No results for input(s): SED, CRP in the last 168 hours.  Recent Labs   Lab 01/04/21  0638 01/04/21  0233 01/03/21  1548 01/03/21  1544   GLC 90  --   --  110*   BGM  --  106* 122*  --      Recent Labs   Lab 01/03/21  1544   INR 2.61*     No results for input(s): TROPONIN, TROPI, TROPR in the last 168 hours.    Invalid input(s): TROP, TROPONINIES  No results for input(s): TSH in the last 168 hours.  Recent Labs   Lab 01/03/21 2030   COLOR Dark Yellow   APPEARANCE Slightly Cloudy   URINEGLC Negative   URINEBILI Large*   URINEKETONE Trace*   SG 1.018   UBLD Negative   URINEPH 6.0   PROTEIN 10*   NITRITE Negative   LEUKEST Negative   RBCU <1   WBCU 4      Imaging:   Results for orders placed or performed during the hospital encounter of 01/03/21   CT Head w/o Contrast    Narrative    CT SCAN OF THE HEAD WITHOUT CONTRAST   1/3/2021 4:40 PM     HISTORY: Confusion, trauma.    TECHNIQUE:  Axial images of the head and coronal reformations without  IV contrast material. Radiation dose for this scan was reduced using  automated exposure control, adjustment of the mA and/or kV according  to patient  size, or iterative reconstruction technique.    COMPARISON: Head CT 8/16/2019    FINDINGS:  Thin left frontal subdural fluid collection is present  measuring up to approximately 6 mm in thickness, previously 1.4 cm.  The collection demonstrates peripheral hyperattenuation and central  hypoattenuation. No significant mass effect.    Background of mild volume loss is present with white matter  hypoattenuation which likely represents mild-to-moderate chronic  small-vessel ischemic change. Small old right superior temporal gyrus  infarct. Parenchyma is otherwise unremarkable.    The visualized calvarium, tympanic cavities, mastoid cavities, and  paranasal sinuses are unremarkable.      Impression    IMPRESSION:   1. Decreased size of left convexity subdural fluid collection,  probably chronic.  2. Otherwise, no acute intracranial abnormality.    PAOLA NARAYAN MD   US Abdomen Limited    Narrative    EXAM: US ABDOMEN LIMITED  LOCATION: [facility]  DATE/TIME: 1/3/2021 6:47 PM    INDICATION: Jaundice.  COMPARISON: None.  TECHNIQUE: Limited abdominal ultrasound.    FINDINGS:    GALLBLADDER: Large amount of polypoid solid debris within the gallbladder lumen presumably sludge. No shadowing. No gallbladder wall thickening.    BILE DUCTS: No biliary dilatation. The common duct measures 5 mm.    LIVER: Cirrhotic appearance to the liver.    RIGHT KIDNEY: No hydronephrosis.    PANCREAS: The visualized portions are normal.    Large volume ascites.      Impression    IMPRESSION:  1.  Cirrhosis with large volume ascites.    2.  Polypoid solid material within the gallbladder lumen presumably sludge. No gallbladder wall thickening or bile duct dilatation.   US Paracentesis    Narrative    US PARACENTESIS 1/4/2021 10:37 AM    CLINICAL HISTORY: HIGH VOLUME paracentesis with or without diagnostic  fluid analysis with labs to be drawn if ordered.    PROCEDURE: Informed consent obtained. Time out performed. The abdomen  was  prepped and draped in a sterile fashion. 10 mL of 1% lidocaine was  infused into local soft tissues. A 5 Serbian catheter system was  introduced into the abdominal ascites under ultrasound guidance.    5.4 liters of straw-colored fluid were removed and sent to lab if  requested.    Patient tolerated procedure well.    Ultrasound imaging was obtained and placed in the patient's permanent  medical record.      Impression    IMPRESSION:  1.  Status post ultrasound-guided paracentesis.    JACKIE NORRIS MD

## 2021-01-04 NOTE — PHARMACY-ADMISSION MEDICATION HISTORY
Admission medication history interview status for this patient is complete. See [facility] admission navigator for allergy information, prior to admission medications and immunization status.     Medication history interview done via telephone during Covid-19 pandemic, indicate source(s): Patient's friend/caregiver Heather [phone]  Medication history resources (including written lists, pill bottles, clinic record): pill bottles    Changes made to PTA medication list:  Added: none  Deleted: ferrous sulfate, oxycodone  Changed: escitalopram 10 mg to 10-20 mg daily  Actions taken by pharmacist (provider contacted, etc):None     Additional medication history information: Patient has not taken medications for a week, per caregiver's report    Medication reconciliation/reorder completed by provider prior to medication history?  N   (Y/N)     Prior to Admission medications    Medication Sig Last Dose Taking? Auth Provider   escitalopram (LEXAPRO) 10 MG tablet Take 10-20 mg by mouth daily  A week ago Yes Unknown, Entered By History   metFORMIN (GLUCOPHAGE) 500 MG tablet Take 500 mg by mouth 2 times daily (with meals) A week ago Yes Reported, Patient   omeprazole (PRILOSEC) 20 MG DR capsule Take 20 mg by mouth 2 times daily A week ago Yes Unknown, Entered By History

## 2021-01-04 NOTE — PLAN OF CARE
/65   Pulse 61   Temp 95.6  F (35.3  C) (Oral)   Resp 18   Wt 94.9 kg (209 lb 3.2 oz)   SpO2 98%   BMI 33.77 kg/m       Admitted in the unit at 1945. Altered mental status. Confused on surrounding. Lungs are clear, high falls risk, unsteady gait. UA collected. Concentrated urine. CIWA scoring 9 given 1mg ativan. PRN melatonin given.  IV SL. Skin is yellow/ jaundice. abd obese / rounded. Urinal at bedside. Continue POC

## 2021-01-04 NOTE — ED NOTES
"[facility]  ED Nurse Handoff Report    Pramod Harris is a 65 year old male   ED Chief complaint: Altered Mental Status  . ED Diagnosis:   Final diagnoses:   Hyponatremia   Hyperbilirubinemia   Anemia, unspecified type   Generalized muscle weakness   Renal insufficiency   Alcohol abuse     Allergies: No Known Allergies    Code Status: Full Code  Activity level - Baseline/Home:  Independent. Activity Level - Current:   Assist X 1. Lift room needed: No. Bariatric: No   Needed: Yes   Isolation: No. Infection: Not Applicable.     Vital Signs:   Vitals:    01/03/21 1730 01/03/21 1745 01/03/21 1800 01/03/21 1815   BP:       Pulse: 78 79 78 77   Resp: 14 14 14 15   Temp:       TempSrc:       SpO2: 100% 99% 98% 100%       Cardiac Rhythm:  ,      Pain level:    Patient confused: Yes. Patient Falls Risk: Yes.   Elimination Status: Denies need to void during ER stay   Patient Report - Initial Complaint: Altered Mental Status. Focused Assessment:  The history is provided by the patient and a caregiver. The history is limited by a language barrier and the condition of the patient. A  was used.      Pramod Harris is a 65 year old male with history of type 2 diabetes, anemia, and alcohol abuse who presents with altered mental status. The patient notes that he has felt very weak and that when he walks he feels dizzy, \"like he was drunk\". He states that he had last drank around 5 days ago. He states that his urine is very yellow for about 2 weeks and that he noted his yellowing of the skin around a week ago. He denies headache, chest pain, nausea, emesis, diarrhea, fever, cough. His caregiver notes that he has a history of alcohol abuse and has noticed that he has had some ankle swelling. He denies black or bloody stools or noted bleeding.      He denies any trauma but his SO states that he fell over picking up food.  Tests Performed: Labs, US, CT Abnormal Results:   Abnormal " Labs Reviewed   CREATININE POCT - Abnormal; Notable for the following components:       Result Value    Creatinine 1.5 (*)     GFR Estimate 47 (*)     GFR Estimate If Black 57 (*)     All other components within normal limits   CBC WITH PLATELETS DIFFERENTIAL - Abnormal; Notable for the following components:    WBC 11.3 (*)     RBC Count 2.80 (*)     Hemoglobin 11.0 (*)     Hematocrit 29.7 (*)      (*)     MCH 39.3 (*)     MCHC 37.0 (*)     Absolute Neutrophil 9.4 (*)     All other components within normal limits   INR - Abnormal; Notable for the following components:    INR 2.61 (*)     All other components within normal limits   COMPREHENSIVE METABOLIC PANEL - Abnormal; Notable for the following components:    Sodium 121 (*)     Chloride 93 (*)     Glucose 110 (*)     Creatinine 1.34 (*)     GFR Estimate 55 (*)     Calcium 7.9 (*)     Bilirubin Total 21.8 (*)     Albumin 1.5 (*)     Alkaline Phosphatase 180 (*)     All other components within normal limits   LIPASE - Abnormal; Notable for the following components:    Lipase 706 (*)     All other components within normal limits   GLUCOSE BY METER - Abnormal; Notable for the following components:    Glucose 122 (*)     All other components within normal limits   BILIRUBIN DIRECT - Abnormal; Notable for the following components:    Bilirubin Direct 14.4 (*)     All other components within normal limits   GGT - Abnormal; Notable for the following components:     (*)     All other components within normal limits     Treatments provided: see EMAR  Family Comments: Significant Other, Heather, [phone]  OBS brochure/video discussed/provided to patient:  N/A  ED Medications:   Medications   lidocaine 1 % 0.1-1 mL (has no administration in time range)   lidocaine (LMX4) cream (has no administration in time range)   sodium chloride (PF) 0.9% PF flush 3 mL (has no administration in time range)   sodium chloride (PF) 0.9% PF flush 3 mL (has no administration in  time range)   0.9% sodium chloride BOLUS (0 mLs Intravenous Stopped 1/3/21 1730)     Followed by   sodium chloride 0.9% infusion ( Intravenous Stopped 1/3/21 0895)     Drips infusing:  No  For the majority of the shift, the patient's behavior Green. Interventions performed were NA.    Sepsis treatment initiated: No     Patient tested for COVID 19 prior to admission: YES    ED Nurse Name/Phone Number: Radha Carrillo RN,   6:48 PM  RECEIVING UNIT ED HANDOFF REVIEW    Above ED Nurse Handoff Report was reviewed: Yes  Reviewed by: Kaylan Ramsay RN on January 3, 2021 at 7:21 PM

## 2021-01-05 NOTE — PLAN OF CARE
"Disoriented to time,  utilized via I-pad but pt stated, \"I do not need one.\" Flat affect. VSS on RA except soft Bp's, pt asymptomatic. Denied pain. CIWA 3, 3. No tele, denied CP . LS clear/dim, no SOB reported. PIV to right intact, SL . Informed refusal out of bed. Sclera / skin jaundiced. Using urinal in bed. Brief changed, bladder scanned for 296 ml but unsure if picking up abd fluid; oncoming RN updated. Pt denied urge to void, little PO intake. Dsg to right abd changed. Will continue POC.   "

## 2021-01-05 NOTE — PROGRESS NOTES
[facility]  [facility]  Gastroenterology Progress Note    Interval History:    Patient denies abdominal pain. Asking for cereal and milk. States his urine is looking clearer.    Physical Exam:    /60 (BP Location: Right arm)   Pulse 77   Temp 95.4  F (35.2  C) (Oral)   Resp 20   Wt 90.2 kg (198 lb 12.8 oz)   SpO2 99%   BMI 32.09 kg/m    Temp (24hrs), Av.5  F (35.3  C), Min:95.4  F (35.2  C), Max:95.7  F (35.4  C)    Patient Vitals for the past 72 hrs:   Weight   21 0632 90.2 kg (198 lb 12.8 oz)   21 94.9 kg (209 lb 3.2 oz)       Intake/Output Summary (Last 24 hours) at 2021 0907  Last data filed at 2021 2130  Gross per 24 hour   Intake 720 ml   Output 250 ml   Net 470 ml       Constitutional: No acute distress.  Eyes: Scleral icterus.  Cardiovascular: RRR.  Respiratory: Nonlabored.  Abdomen: Round abdomen, nondistended, nontender.  Skin: Jaundice.    Additional Comments:  ROS, FH, SH: See initial GI consult for details.    Laboratory Data:  Recent Labs   Lab Test 21  0807 21  0638 21  1544 19  2104 19  2104   WBC 12.6* 12.0* 11.3*  --  4.2   HGB 9.8* 10.8* 11.0*   < > 13.9   * 104* 106*  --  99    173 181  --  173   INR 2.97*  --  2.61*  --  1.15*    < > = values in this interval not displayed.     Recent Labs   Lab Test 21  0807 21  0638 21  0154 21  1544 21  1544   * 124* 122*   < > 121*   POTASSIUM 4.1 3.8  --   --  3.8   CHLORIDE 95 94  --   --  93*   CO2 22 21  --   --  22   BUN 38* 32*  --   --  28   CR 1.62* 1.41*  --   --  1.34*   ANIONGAP 7 9  --   --  6   OVIDIO 8.1* 8.1*  --   --  7.9*    < > = values in this interval not displayed.     Recent Labs   Lab Test 21  0807 21  0638 21  2030 21  1544   ALBUMIN 1.2* 1.4*  --  1.5*   BILITOTAL 19.1* 21.5*  --  21.8*   DBIL  --  14.8*  --  14.4*   ALT 38 43  --  42   * 134*  --  Canceled,  Test credited   ALKPHOS 161* 170*  --  180*   PROTEIN  --   --  10*  --    LIPASE  --   --   --  706*       Imaging / Endoscopy:    RUQ US 1/3/21   - Cirrhosis with large volume ascites.   - Polypoid solid material within the gallbladder lumen presumably sludge. No gallbladder wall thickening or bile duct dilatation.    Assessment & Plan:  Pramod Harris is a 65-year-old male who was admitted 1/3/21 after presenting with multiple symptoms including abdominal distention, lower extremity edema, decreased oral intake, jaundice, and dark urine. Found to have multiple lab abnormalities (hyponatremia, hyperbilirubinemia, elevated creatinine, elevated INR) and US showed cirrhosis with large volume ascites.     1) Cirrhosis with ascites: Etiology unclear, but suspect alcohol. Elevated LFTs most likely represent acute alcohol hepatitis on chronic liver disease.  and so started on prednisolone. MELD-Na 35 on admission, now 37. Creatinine, INR rising today. Bili improving. Hepatitis A/B/C negative. Paracentesis performed 1/4 with 5400 mL removed, negative for SBP..        - Trend LFTs, INR, renal function.        - Repeat paracentesis PRN.        - Holding diuretics for now due to YVONNE and hyponatremia. Can start Lasix/Aldactone when renal function improves.        - Prednisolone 40 mg daily for alcohol hepatitis; will calculate Lille score on day 7 (1/10).        - Low sodium, high protein diet.        - Alcohol cessation support.        - Establish care with [facility] as outpatient.    Discussed with Dr. Stevenson.    Misty Tan PA-C  [facility] ([facility])

## 2021-01-05 NOTE — PROGRESS NOTES
[facility]  Hospitalist Progress Note  Jaden Hui MD, MD 01/05/2021  (Text Page)  Reason for Stay (Diagnosis): Acute alcoholic hepatitis         Assessment and Plan:      Summary of Stay: Pramod Harris is a 65 year old male with history of SAH, SDH, traumatic induced, alcohol misuse, abuse, diabetes mellitus, GERD, depression and was admitted on 1/3/2021 with increasing abdominal distention, jaundice and found with alcoholic cirrhosis    Problem List:   1. Alcohol liver disease  2. Acute alcohol hepatitis  3. Suspected EtOH liver cirrhosis  4. Alcohol abuse  5. Ascites status post paracentesis  6. History of traumatic SAH, SDH  7. Non-insulin-requiring diabetes mellitus  8. GERD  9. Hypovolemic hyponatremia  10. Macrocytic anemia    Continue inpatient care.  Remain at risk for clinical deterioration.  On CIWA benzodiazepine triggered protocol if with alcohol withdrawals as he remain high risk for this  Tolerated diagnostic and therapeutic paracentesis earlier, drain 5400 mils  Started on corticosteroids with prednisone 40 c/o GI service  -Did not start diuretics yet given hyponatremia, YVONNE status  Serum sodium stable.  Not receiving IV fluids given significant amount of third spacing likely from liver failure and hypoalbuminemia  Monitor glucose levels, holding home regimen of Metformin might not be the best regimen for him given current kidney injury and liver failure  -Referral to chemical dependency  -Patient significant other updated and she provided me with information that our patient truly has some issues with alcoholism as patient was not very forthright with his EtOH intake account.    DVT Prophylaxis: Pneumatic Compression Devices  Code Status: Full Code  Discharge Dispo: Home  Estimated Disch Date / # of Days until Disch: Anticipating continuation of inpatient care at least for the next 2-3 more days        Interval History (Subjective):      Continuin service care today.   Seen and examined.  Chart reviewed.  Case discussed with nursing service.  No significant reported events overnight  Patient is endorsing no chest pain, shortness of breath or abdominal pain however still feeling weak but tolerating some of his oral diet with no reported nausea or vomiting  Remain afebrile  No bleeding tendencies reported  Patient tolerated paracentesis with 5400 mL of cody fluid collected       # Pain Assessment:  Current Pain Score 1/5/2021   Patient currently in pain? sleeping, patient not able to self report   Pain location -   Pain descriptors -   Pramod s pain level was assessed and he currently denies pain.                  Physical Exam:      Last Vital Signs:  /60 (BP Location: Right arm)   Pulse 77   Temp 95.4  F (35.2  C) (Oral)   Resp 20   Wt 90.2 kg (198 lb 12.8 oz)   SpO2 99%   BMI 32.09 kg/m      I/O last 3 completed shifts:  In: 720 [P.O.:720]  Out: 250 [Urine:250]  Wt Readings from Last 1 Encounters:   01/04/21 90.2 kg (198 lb 12.8 oz)     Vitals:    01/03/21 2000 01/04/21 0632   Weight: 94.9 kg (209 lb 3.2 oz) 90.2 kg (198 lb 12.8 oz)       Constitutional: Awake, alert, cooperative, no apparent distress   Respiratory: Clear to auscultation bilaterally, no crackles or wheezing   Cardiovascular: Regular rate and rhythm, normal S1 and S2, and no murmur noted   Abdomen: Normal bowel sounds, mildly distended, no guarding   Skin:  Severe jaundice   Neuro: Alert and oriented x3, no weakness, spontaneous and coherent speech   Extremities: No edema, normal range of motion   Other(s): Euthymic mood, not agitated    Numerous skin excoriations on both upper and lower extremities   All other systems: Negative          Medications:      All current medications were reviewed with changes reflected in problem list.         Data:      All new lab and imaging data was reviewed.   Labs:  Recent Labs   Lab 01/04/21  1005   CULT PENDING     No results for input(s): PH, PHARTERIAL, PO2,  [id number], SAT, PCO2, HCO3, BASEEXCESS, FRANCOIS, BEB in the last 168 hours.    Invalid input(s): [id number]  Recent Labs   Lab 01/04/21 0638 01/03/21  1544   WBC 12.0* 11.3*   HGB 10.8* 11.0*   HCT 28.9* 29.7*   * 106*    181     Recent Labs   Lab 01/05/21  0807 01/04/21  0638 01/04/21  0154 01/03/21 2004 01/03/21 2004 01/03/21  1550 01/03/21  1544   * 124* 122*   < >  --   --  121*   POTASSIUM 4.1 3.8  --   --   --   --  3.8   CHLORIDE 95 94  --   --   --   --  93*   CO2 22 21  --   --   --   --  22   ANIONGAP 7 9  --   --   --   --  6   * 90  --   --   --   --  110*   BUN 38* 32*  --   --   --   --  28   CR 1.62* 1.41*  --   --   --   --  1.34*   GFRESTIMATED 44* 52*  --   --   --  47* 55*   GFRESTBLACK 51* 60*  --   --   --  57* 64   OVIDIO 8.1* 8.1*  --   --   --   --  7.9*   MAG  --   --   --   --  2.4*  --   --    PHOS  --   --   --   --  3.2  --   --    PROTTOTAL PENDING 7.1  --   --   --   --  7.5   ALBUMIN PENDING 1.4*  --   --   --   --  1.5*   BILITOTAL PENDING 21.5*  --   --   --   --  21.8*   ALKPHOS PENDING 170*  --   --   --   --  180*   AST PENDING 134*  --   --   --   --  Canceled, Test credited   ALT PENDING 43  --   --   --   --  42    < > = values in this interval not displayed.     No results for input(s): SED, CRP in the last 168 hours.  Recent Labs   Lab 01/05/21  0807 01/04/21  1537 01/04/21  0638 01/04/21  0233 01/03/21  1548 01/03/21  1544   *  --  90  --   --  110*   BGM  --  123*  --  106* 122*  --      Recent Labs   Lab 01/05/21  0807 01/03/21  1544   INR 2.97* 2.61*     No results for input(s): TROPONIN, TROPI, TROPR in the last 168 hours.    Invalid input(s): TROP, TROPONINIES  No results for input(s): TSH in the last 168 hours.  Recent Labs   Lab 01/03/21  2030   COLOR Dark Yellow   APPEARANCE Slightly Cloudy   URINEGLC Negative   URINEBILI Large*   URINEKETONE Trace*   SG 1.018   UBLD Negative   URINEPH 6.0   PROTEIN 10*   NITRITE Negative   LEUKEST  Negative   RBCU <1   WBCU 4      Imaging:   Results for orders placed or performed during the hospital encounter of 01/03/21   CT Head w/o Contrast    Narrative    CT SCAN OF THE HEAD WITHOUT CONTRAST   1/3/2021 4:40 PM     HISTORY: Confusion, trauma.    TECHNIQUE:  Axial images of the head and coronal reformations without  IV contrast material. Radiation dose for this scan was reduced using  automated exposure control, adjustment of the mA and/or kV according  to patient size, or iterative reconstruction technique.    COMPARISON: Head CT 8/16/2019    FINDINGS:  Thin left frontal subdural fluid collection is present  measuring up to approximately 6 mm in thickness, previously 1.4 cm.  The collection demonstrates peripheral hyperattenuation and central  hypoattenuation. No significant mass effect.    Background of mild volume loss is present with white matter  hypoattenuation which likely represents mild-to-moderate chronic  small-vessel ischemic change. Small old right superior temporal gyrus  infarct. Parenchyma is otherwise unremarkable.    The visualized calvarium, tympanic cavities, mastoid cavities, and  paranasal sinuses are unremarkable.      Impression    IMPRESSION:   1. Decreased size of left convexity subdural fluid collection,  probably chronic.  2. Otherwise, no acute intracranial abnormality.    PAOLA NARAYAN MD   US Abdomen Limited    Narrative    EXAM: US ABDOMEN LIMITED  LOCATION: [facility]  DATE/TIME: 1/3/2021 6:47 PM    INDICATION: Jaundice.  COMPARISON: None.  TECHNIQUE: Limited abdominal ultrasound.    FINDINGS:    GALLBLADDER: Large amount of polypoid solid debris within the gallbladder lumen presumably sludge. No shadowing. No gallbladder wall thickening.    BILE DUCTS: No biliary dilatation. The common duct measures 5 mm.    LIVER: Cirrhotic appearance to the liver.    RIGHT KIDNEY: No hydronephrosis.    PANCREAS: The visualized portions are normal.    Large volume ascites.       Impression    IMPRESSION:  1.  Cirrhosis with large volume ascites.    2.  Polypoid solid material within the gallbladder lumen presumably sludge. No gallbladder wall thickening or bile duct dilatation.   US Paracentesis    Narrative    US PARACENTESIS 1/4/2021 10:37 AM    CLINICAL HISTORY: HIGH VOLUME paracentesis with or without diagnostic  fluid analysis with labs to be drawn if ordered.    PROCEDURE: Informed consent obtained. Time out performed. The abdomen  was prepped and draped in a sterile fashion. 10 mL of 1% lidocaine was  infused into local soft tissues. A 5 [place] catheter system was  introduced into the abdominal ascites under ultrasound guidance.    5.4 liters of straw-colored fluid were removed and sent to lab if  requested.    Patient tolerated procedure well.    Ultrasound imaging was obtained and placed in the patient's permanent  medical record.      Impression    IMPRESSION:  1.  Status post ultrasound-guided paracentesis.    AJCKIE NORRIS MD

## 2021-01-05 NOTE — CONSULTS
1/5/21 chem dep consult acknowledged.  Expect chem dep consult to be completed 1/6/21 as long as patient is medically appropriate and A&O.    Kaylan Black, [facility]  [phone]

## 2021-01-05 NOTE — PROVIDER NOTIFICATION
Mini-Cog Assessment: GB in use, WAR, falls wristband on, non-skid socks in use, bed alarms on, pt educated on falls risk and call light, pt room close to RN desk, bedside commode for BM and urinal at bedside or in bed, hourly rounding,  utilized       Mini-Cog Assessment Score:  1/5.

## 2021-01-05 NOTE — PLAN OF CARE
Pt. Alert, forgetful, slightly confused to time and situation. Pt. Up with assist of 2, and gaitbelt, unsteady of feet, up to commode or using urinal at bedside. Skin jaundiced. S/P paracentesis site to right abdomen, clean, dry and intact. Lung sounds diminished, room air sat's at 99%. CIWA score 2,3,3. Pt. Denies any needs at this time. Will continue with POC.

## 2021-01-06 NOTE — PROGRESS NOTES
[facility]  Hospitalist Progress Note  Jaden Hui MD, MD 01/06/2021  (Text Page)  Reason for Stay (Diagnosis): Acute alcoholic hepatitis         Assessment and Plan:      Summary of Stay: Pramod Harris is a 65 year old male with history of SAH, SDH, traumatic induced, alcohol misuse, abuse, diabetes mellitus, GERD, depression and was admitted on 1/3/2021 with increasing abdominal distention, jaundice and found with alcoholic cirrhosis    Problem List:   1. Alcohol liver disease/liver failure  2. Acute alcohol hepatitis  3. Suspected EtOH liver cirrhosis  4. Alcohol abuse  5. Ascites status post paracentesis  6. History of traumatic SAH, SDH  7. Non-insulin-requiring diabetes mellitus  8. GERD  9. hyponatremia third spacing  10. Macrocytic anemia  11. Suspected hepatic encephalopathy    Continue inpatient care.  Remain at risk for clinical deterioration.  On [facility] benzodiazepine triggered protocol if with alcohol withdrawals as he remain high risk for this  -Patient is not exhibiting overt alcohol withdrawals.  I have discontinued his lorazepam as this can further worsen or precipitate issues with mental status changes  -Check ammonia levels, added lactulose twice daily for now hopefully he can tolerate this and if not and he has significant hyperammonemia then lactulose enema might be needed    Tolerated diagnostic and therapeutic paracentesis earlier, drain 5400 mils, ascitic fluid not showing evidence of SBP  Started on corticosteroids with prednisone 40 c/o GI service  -Did not start diuretics yet given hyponatremia, YVONNE status  Serum sodium stable but still showing hyponatremia.  Not receiving IV fluids given significant amount of third spacing likely from liver failure and hypoalbuminemia  Monitor glucose levels, holding home regimen of Metformin might not be the best regimen for him given current kidney injury and liver failure  -Referral to chemical dependency  -Patient  significant other updated and she provided me with information that our patient truly has some issues with alcoholism as patient was not very forthright with his EtOH intake account.  -I updated patient's significant other over the phone again this morning and provided her with current clinical situation and importance of complete EtOH cessation as clearly our patient is very sick with complications of chronic alcoholism    DVT Prophylaxis: Pneumatic Compression Devices  Code Status: Full Code  Discharge Dispo: Home  Estimated Disch Date / # of Days until Disch: Anticipating continuation of inpatient care at least for the next 2-3 more days        Interval History (Subjective):      Continuing service care today.  Seen and examined.  Chart reviewed.  Case discussed with nursing service.  No significant reported events overnight never this morning I was informed by nursing service regarding patient becoming more lethargic and sedated.  Initially he was not responding even with vigorous verbal and physical stimuli  -During exam fortunately patient was easily aroused with verbal stimuli using [place] language.  He denies any vomiting nor abdominal pain.  He was following some simple verbal instructions but clearly still not at his baseline mental state.  -No reported bleeding tendencies, no diarrhea, tolerating some of his oral intake yesterday but not much this morning       # Pain Assessment:  Current Pain Score 1/6/2021   Patient currently in pain? denies   Pain location -   Pain descriptors -   Pramod s pain level was assessed and he currently denies pain.                  Physical Exam:      Last Vital Signs:  /71 (BP Location: Right arm)   Pulse 82   Temp 97.9  F (36.6  C) (Temporal)   Resp 18   Wt 87.8 kg (193 lb 9.6 oz)   SpO2 100%   BMI 31.25 kg/m      I/O last 3 completed shifts:  In: 540 [P.O.:540]  Out: 1075 [Urine:1075]  Wt Readings from Last 1 Encounters:   01/06/21 87.8 kg (193 lb 9.6 oz)      Vitals:    01/03/21 2000 01/04/21 0632 01/06/21 0615   Weight: 94.9 kg (209 lb 3.2 oz) 90.2 kg (198 lb 12.8 oz) 87.8 kg (193 lb 9.6 oz)       Constitutional: Awake, alert, cooperative, no apparent distress   Respiratory: Clear to auscultation bilaterally, no crackles or wheezing   Cardiovascular: Regular rate and rhythm, normal S1 and S2, and no murmur noted   Abdomen: Normal bowel sounds, mildly distended, no guarding   Skin:  Severe jaundice   Neuro:  Sedated and lethargic but easily aroused with verbal stimuli and was  oriented x2, no weakness, spontaneous and coherent speech   Extremities: No edema, normal range of motion   Other(s): Euthymic mood, not agitated    Numerous skin excoriations on both upper and lower extremities   All other systems: Negative          Medications:      All current medications were reviewed with changes reflected in problem list.         Data:      All new lab and imaging data was reviewed.   Labs:  Recent Labs   Lab 01/04/21  1005   CULT Culture negative monitoring continues     No results for input(s): PH, PHARTERIAL, PO2, [id number], SAT, PCO2, HCO3, BASEEXCESS, FRANCOIS, BEB in the last 168 hours.    Invalid input(s): [id number]  Recent Labs   Lab 01/06/21  0634 01/05/21  0807 01/04/21  0638   WBC 14.4* 12.6* 12.0*   HGB 11.3* 9.8* 10.8*   HCT 30.4* 26.4* 28.9*   * 104* 104*    166 173     Recent Labs   Lab 01/06/21  0634 01/05/21  0807 01/04/21  0638 01/03/21 2004 01/03/21 2004   * 124* 124*   < >  --    POTASSIUM 4.4 4.1 3.8  --   --    CHLORIDE 94 95 94  --   --    CO2 22 22 21  --   --    ANIONGAP 7 7 9  --   --    * 121* 90  --   --    BUN 42* 38* 32*  --   --    CR 1.61* 1.62* 1.41*  --   --    GFRESTIMATED 44* 44* 52*  --   --    GFRESTBLACK 51* 51* 60*  --   --    OVIDIO 8.4* 8.1* 8.1*  --   --    MAG  --   --   --   --  2.4*   PHOS  --   --   --   --  3.2   PROTTOTAL 7.3 6.5* 7.1  --   --    ALBUMIN 1.4* 1.2* 1.4*  --   --    BILITOTAL 20.7*  19.1* 21.5*  --   --    ALKPHOS 183* 161* 170*  --   --    * 105* 134*  --   --    ALT 48 38 43  --   --     < > = values in this interval not displayed.     No results for input(s): SED, CRP in the last 168 hours.  Recent Labs   Lab 01/06/21  0634 01/05/21  0807 01/04/21  1537 01/04/21  0638 01/04/21  0233 01/03/21  1548 01/03/21  1544   * 121*  --  90  --   --  110*   BGM  --   --  123*  --  106* 122*  --      Recent Labs   Lab 01/06/21  0634 01/05/21  0807 01/03/21  1544   INR 2.76* 2.97* 2.61*     No results for input(s): TROPONIN, TROPI, TROPR in the last 168 hours.    Invalid input(s): TROP, TROPONINIES  No results for input(s): TSH in the last 168 hours.  Recent Labs   Lab 01/03/21  2030   COLOR Dark Yellow   APPEARANCE Slightly Cloudy   URINEGLC Negative   URINEBILI Large*   URINEKETONE Trace*   SG 1.018   UBLD Negative   URINEPH 6.0   PROTEIN 10*   NITRITE Negative   LEUKEST Negative   RBCU <1   WBCU 4      Imaging:   Results for orders placed or performed during the hospital encounter of 01/03/21   CT Head w/o Contrast    Narrative    CT SCAN OF THE HEAD WITHOUT CONTRAST   1/3/2021 4:40 PM     HISTORY: Confusion, trauma.    TECHNIQUE:  Axial images of the head and coronal reformations without  IV contrast material. Radiation dose for this scan was reduced using  automated exposure control, adjustment of the mA and/or kV according  to patient size, or iterative reconstruction technique.    COMPARISON: Head CT 8/16/2019    FINDINGS:  Thin left frontal subdural fluid collection is present  measuring up to approximately 6 mm in thickness, previously 1.4 cm.  The collection demonstrates peripheral hyperattenuation and central  hypoattenuation. No significant mass effect.    Background of mild volume loss is present with white matter  hypoattenuation which likely represents mild-to-moderate chronic  small-vessel ischemic change. Small old right superior temporal gyrus  infarct. Parenchyma is  otherwise unremarkable.    The visualized calvarium, tympanic cavities, mastoid cavities, and  paranasal sinuses are unremarkable.      Impression    IMPRESSION:   1. Decreased size of left convexity subdural fluid collection,  probably chronic.  2. Otherwise, no acute intracranial abnormality.    PAOLA NARAYAN MD   US Abdomen Limited    Narrative    EXAM: US ABDOMEN LIMITED  LOCATION: [facility]  DATE/TIME: 1/3/2021 6:47 PM    INDICATION: Jaundice.  COMPARISON: None.  TECHNIQUE: Limited abdominal ultrasound.    FINDINGS:    GALLBLADDER: Large amount of polypoid solid debris within the gallbladder lumen presumably sludge. No shadowing. No gallbladder wall thickening.    BILE DUCTS: No biliary dilatation. The common duct measures 5 mm.    LIVER: Cirrhotic appearance to the liver.    RIGHT KIDNEY: No hydronephrosis.    PANCREAS: The visualized portions are normal.    Large volume ascites.      Impression    IMPRESSION:  1.  Cirrhosis with large volume ascites.    2.  Polypoid solid material within the gallbladder lumen presumably sludge. No gallbladder wall thickening or bile duct dilatation.   US Paracentesis    Narrative    US PARACENTESIS 1/4/2021 10:37 AM    CLINICAL HISTORY: HIGH VOLUME paracentesis with or without diagnostic  fluid analysis with labs to be drawn if ordered.    PROCEDURE: Informed consent obtained. Time out performed. The abdomen  was prepped and draped in a sterile fashion. 10 mL of 1% lidocaine was  infused into local soft tissues. A 5 [place] catheter system was  introduced into the abdominal ascites under ultrasound guidance.    5.4 liters of straw-colored fluid were removed and sent to lab if  requested.    Patient tolerated procedure well.    Ultrasound imaging was obtained and placed in the patient's permanent  medical record.      Impression    IMPRESSION:  1.  Status post ultrasound-guided paracentesis.    JACKIE NORRIS MD

## 2021-01-06 NOTE — PLAN OF CARE
VS stable. Diminished LS. Distended abdomen. Jaundiced. Disoriented to time. CIWA scores of 3 and 3. Slept well throughout the night.

## 2021-01-06 NOTE — PLAN OF CARE
Lethargic, oriented to self only. VSS on RA. Denied pain. CIWA 2, 2. No tele, denied CP . LS clear/dim, no SOB reported. PIV to right intact, SL . Side-lying in bed on own, informed refusal out of bed. Incontinent, benjamin cares provided. Poor PO intake. Started on PO lactulose today, BID; ammonia 82. Chem dep consult 1/7. Will continue POC.

## 2021-01-06 NOTE — PROVIDER NOTIFICATION
MD paged [phone]: Bladder scanned pt for 619ml.  No urge to void. Did you want orders to straight cath?

## 2021-01-06 NOTE — PROGRESS NOTES
[facility]  [facility]  Gastroenterology Progress Note    Interval History:    Patient sleeping. No significant events reported overnight.    Physical Exam:    /71 (BP Location: Right arm)   Pulse 82   Temp 97.9  F (36.6  C) (Temporal)   Resp 18   Wt 87.8 kg (193 lb 9.6 oz)   SpO2 100%   BMI 31.25 kg/m    Temp (24hrs), Av.5  F (35.3  C), Min:95.4  F (35.2  C), Max:95.7  F (35.4  C)    Patient Vitals for the past 72 hrs:   Weight   21 0615 87.8 kg (193 lb 9.6 oz)   21 06 90.2 kg (198 lb 12.8 oz)   21 94.9 kg (209 lb 3.2 oz)       Intake/Output Summary (Last 24 hours) at 2021 0907  Last data filed at 2021 2130  Gross per 24 hour   Intake 720 ml   Output 250 ml   Net 470 ml       Constitutional: No acute distress.  Cardiovascular: RRR.  Respiratory: Nonlabored.  Abdomen: Round abdomen, mild distention.  Skin: Jaundice.    Additional Comments:  ROS, FH, SH: See initial GI consult for details.    Laboratory Data:  Recent Labs   Lab Test 21  0807 21  0621  1544   WBC 14.4* 12.6* 12.0* 11.3*   HGB 11.3* 9.8* 10.8* 11.0*   * 104* 104* 106*    166 173 181   INR 2.76* 2.97*  --  2.61*     Recent Labs   Lab Test 21  0634 21  0807 21  06   * 124* 124*   POTASSIUM 4.4 4.1 3.8   CHLORIDE 94 95 94   CO2 22 22 21   BUN 42* 38* 32*   CR 1.61* 1.62* 1.41*   ANIONGAP 7 7 9   OVIDIO 8.4* 8.1* 8.1*     Recent Labs   Lab Test 21  0634 21  0807 21  0621  21  1544   ALBUMIN 1.4* 1.2* 1.4*  --  1.5*   BILITOTAL 20.7* 19.1* 21.5*  --  21.8*   DBIL  --   --  14.8*  --  14.4*   ALT 48 38 43  --  42   * 105* 134*  --  Canceled, Test credited   ALKPHOS 183* 161* 170*  --  180*   PROTEIN  --   --   --  10*  --    LIPASE  --   --   --   --  706*       Imaging / Endoscopy:    [facility] US 1/3/21   - Cirrhosis with large volume ascites.   - Polypoid solid  material within the gallbladder lumen presumably sludge. No gallbladder wall thickening or bile duct dilatation.    Assessment & Plan:  Pramod Harris is a 65-year-old male who was admitted 1/3/21 after presenting with multiple symptoms including abdominal distention, lower extremity edema, decreased oral intake, jaundice, and dark urine. Found to have multiple lab abnormalities (hyponatremia, hyperbilirubinemia, elevated creatinine, elevated INR) and US showed cirrhosis with large volume ascites.     1) Cirrhosis with ascites: Etiology unclear, but suspect alcohol. Elevated LFTs most likely represent acute alcohol hepatitis on chronic liver disease.  and so started on prednisolone. MELD-Na 35 on admission, now 36. Hepatitis A/B/C negative. Paracentesis performed 1/4 with 5400 mL removed, negative for SBP. Bilirubin up a little today; creatinine and INR are stable.        - Trend LFTs, INR, renal function.        - Repeat paracentesis PRN.        - Holding diuretics for now due to YVONNE and hyponatremia. Can start Lasix/Aldactone when renal function improves.        - Prednisolone 40 mg daily for alcohol hepatitis; will calculate Lille score on day 7 (1/10).        - Patient lethargic today, consider adding lactulose for potential HE.        - Low sodium, high protein diet.        - Alcohol cessation support.        - Establish care with [facility] as outpatient.    Discussed with Dr. Stevenson.    Misty Tan PA-C  [facility] ([facility])

## 2021-01-06 NOTE — PLAN OF CARE
VSS on RA.  Disorientated to time.  CIWA 3,3.  Denies pain.  R PIV SL.  LS clear, denies SOB.  No tele, denies CP.  R abdomen dressing CDI.  Reg diet, poor appetite. Voided in urinal x1.  Bladder scanned for 619 ml, pt had no urge to void- MD notified.  Discharge tbd.  Continue POC.      Addendum: Straight cath ordered, straight cathed for 425 ml.  Pt now states he has the urge to void.

## 2021-01-06 NOTE — PROVIDER NOTIFICATION
"MD paged: \"Update: Pt extremely lethargic, barely opens eyes when I talk to him. Slow to follow commands/does not follow appropriately. \"  "

## 2021-01-06 NOTE — CONSULTS
1/6/21 chem dep consult acknowledged.      Spoke with RN, Denise, and we agreed patient not appropriate for interview today due to increasing lethargy.  Will hold off and follow up tomorrow.    Kaylan Black, [facility]  [phone]

## 2021-01-07 NOTE — PLAN OF CARE
OT/PT: Per discussion with RN, pt not appropriate for therapy evals at this time due to lethargy/confusion. Will reschedule.

## 2021-01-07 NOTE — PLAN OF CARE
Pt A/Ox2-3, lethargic at times, but arouses to voice. up with assist of 2. Pt denies pain, N/V, SOB, lightheadedness, and dizziness. Scored 1 & 1 on CIWA Scale.  Right PIV saline locked. PT on low fat low Na diet, with improved appetite, ate 75% of meal.  Pt requires assistance and needs a lot of encouragement to eat and drink fluids.   VSS. Ammonia levels elevated, scheduled lactulose x1 given this evening.  Incontinent of bladder, no bowel movement this shift.   Chem dep to follow up when pt more awake.  Updated significant other today Leilani.  Plan to discharge home TBD. Continue with POC.

## 2021-01-07 NOTE — CONSULTS
1/7/21 chem dep consult acknowledged.  Spoke with RN, Belinda, and she reported patient continues to be confused and lethargic.  We agreed patient would not be appropriate for interview at this time.  I informed her, I would be closing consult at this time, so once patient becomes A&O and appropriate a new consult order can be placed.        Kaylan Black, [facility]  [phone]

## 2021-01-07 NOTE — PROGRESS NOTES
GASTROENTEROLOGY PROGRESS NOTE        SUBJECTIVE:  Uninterested in interview. Denies abdominal pain. No bowel movements since starting lactulose.      OBJECTIVE:    /66 (BP Location: Right arm)   Pulse 79   Temp 97.5  F (36.4  C) (Temporal)   Resp 18   Wt 87.1 kg (192 lb)   SpO2 99%   BMI 30.99 kg/m    Temp (24hrs), Av.5  F (35.8  C), Min:95.4  F (35.2  C), Max:97.5  F (36.4  C)    Patient Vitals for the past 72 hrs:   Weight   21 0402 87.1 kg (192 lb)   21 0615 87.8 kg (193 lb 9.6 oz)       Intake/Output Summary (Last 24 hours) at 2021 1120  Last data filed at 2021 0930  Gross per 24 hour   Intake 820 ml   Output --   Net 820 ml        PHYSICAL EXAM     Constitutional: NAD, confused  Abdomen: soft, NTTP  LE edema         Additional Comments:  ROS, FH, SH: See initial GI consult for details.     I have reviewed the patient's new clinical lab results:     Recent Labs   Lab Test 21  0655 21  0634 21  0807 21  1544 21  1544   WBC 15.7* 14.4* 12.6*   < > 11.3*   HGB 10.4* 11.3* 9.8*   < > 11.0*   * 103* 104*   < > 106*    191 166   < > 181   INR  --  2.76* 2.97*  --  2.61*    < > = values in this interval not displayed.     Recent Labs   Lab Test 21  0655 21  0634 21  0807   POTASSIUM 3.9 4.4 4.1   CHLORIDE 96 94 95   CO2 23 22 22   BUN 48* 42* 38*   ANIONGAP 6 7 7     Recent Labs   Lab Test 21  0655 21  0634 21  0807 21  2030 21  2030 21  1544   ALBUMIN 1.4* 1.4* 1.2*   < >  --  1.5*   BILITOTAL 19.6* 20.7* 19.1*   < >  --  21.8*   ALT 56 48 38   < >  --  42   * 120* 105*   < >  --  Canceled, Test credited   PROTEIN  --   --   --   --  10*  --    LIPASE  --   --   --   --   --  706*    < > = values in this interval not displayed.        ASSESSMENT/ PLAN  Pramod Harris is a 65-year-old male who was admitted 1/3/21 after presenting with multiple symptoms including abdominal  distention, lower extremity edema, and jaundice, . Found to have multiple lab abnormalities (hyponatremia, hyperbilirubinemia, elevated creatinine, elevated INR) and US showed cirrhosis with large volume ascites.     1. Cirrhosis with ascites: Etiology unclear, but suspect alcohol. Elevated LFTs most likely represent acute alcohol hepatitis on chronic liver disease.  and so started on prednisolone. MELD-Na 35 on admission. Hepatitis A/B/C negative. Paracentesis performed 1/4 with 5.4 L removed, negative for SBP. Bilirubin slight improvement creatinine 1.68        - Trend LFTs, INR, renal function.        - Repeat paracentesis PRN.        - Holding diuretics for now due to YVONNE and hyponatremia. Can start Lasix/Aldactone when renal function improves.        - Prednisolone 40 mg daily for alcohol hepatitis; will calculate Lille score on day 7 (1/10).        - Increase lactulose to 20 mg TID         - Low sodium, high protein diet.        - Alcohol cessation support.      Discussed with Dr. Graham Mesa, PA-C  [facility] ( [facility])

## 2021-01-07 NOTE — PLAN OF CARE
VSS, denies pain. Alert, disoriented to time, situation. CIWAs 3, 3, scoring on orientation only. LS diminished. Skin yellow. Abdomen distended. Incontinent of urine, urine is bright yellow in color and concentrated. Dressing to abdominal incision CDI. Up with assist of 2, GB and walker.
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
## 2021-01-07 NOTE — PLAN OF CARE
Presentation/Diagnosis:CIWA, alcohol cirrhosis, Confusion  History:Alcohol abuse, DM, Kidney and live failure,   Labs/Protocols:CIWA 2. CESIA 20.7, Ammonia 82, , Creat 1.68  Vitals:/66 (BP Location: Right arm)   Pulse 79   Temp 97.5  F (36.4  C) (Temporal)   Resp 18   Wt 87.1 kg (192 lb)   SpO2 99%   BMI 30.99 kg/m     Respiratory:RA sating 99%  Neuro:confused, impulsive at times, disoriented to time, situation   GI/:Inc of B&B  Skin:jaundice, brusing, thora site on right side, woc saw pt today and said skin is fine   LDAs:PIV SL  Diet:Cardiac diet   Activity:A1 walker GB  Teaching:pt educated on plan of care   Plan:CD called and signed off since pt is too lethargic and confused. If CD needs to see pt for discharge planning, new consult needs to be place. Lactulose is scheduled TID. Abd. Is distended. No BM this shift. GI saw pt this afternoon. Will cont with POC.

## 2021-01-07 NOTE — CONSULTS
WO Nurse Inpatient Wound Assessment   Reason for consultation: Evaluate and treat  Right flank wounds    Assessment  Right flank wounds due to thoracentesis site   Status: initial assessment  Small agranular area draining minimal drainage  Treatment Plan  Right flank wounds: Daily    1. Cleanse with wound cleanser and dry  2. Cover with dry gauze   Orders Written  Recommended provider order: None, at this time  WOC Nurse follow-up plan:weekly  Nursing to notify the Provider(s) and re-consult the WOC Nurse if wound(s) deteriorates or new skin concern.    Patient History  According to provider note(s):  This is a 65-year-old male with a history of diabetes mellitus, alcohol use disorder, depression, and intracranial hemorrhage, presenting to [facility] for evaluation of abdominal distention.  History is obtained from my discussion with a significant other at the bedside.  The history from the patient was also obtained, but somewhat limited due to confusion.  I also discussed the case with the ED physician, Dr. Tyson.  The electronic medical record was also reviewed.     Objective Data    Active Diet Order  Orders Placed This Encounter      Low Saturated Fat Na <2400 mg      Output:   I/O last 3 completed shifts:  In: 780 [P.O.:780]  Out: -     Risk Assessment:   Sensory Perception: 3-->slightly limited  Moisture: 3-->occasionally moist  Activity: 3-->walks occasionally  Mobility: 2-->very limited  Nutrition: 2-->probably inadequate  Friction and Shear: 2-->potential problem  Dagoberto Score: 15                          Labs:   Recent Labs   Lab 01/07/21  0655 01/06/21  0634   ALBUMIN 1.4* 1.4*   HGB 10.4* 11.3*   INR  --  2.76*   WBC 15.7* 14.4*       Physical Exam  Areas of skin assessed: focused Right flank    Wound Location:  Right flank  Date of last photo none  Wound History: Patient had recent thoracentesis     Wound Base: 100 % agranular     Palpation of the wound bed: normal      Drainage: small      Description of drainage: serosanguinous     Measurements (length x width x depth, in cm) 0.2 x 0.5 cm      Tunneling N/A     Undermining N/A  Periwound skin: intact      Color: normal and consistent with surrounding tissue      Temperature: normal   Odor: none  Pain: denies , none    Interventions  Visual inspection and assessment completed   Wound Care Rationale Improve absorptive capacity  Wound Care: done per plan of care  Supplies: at bedside  Current off-loading measures: Pillows under calves and Pillows  Current support surface: Standard  Atmos Air mattress  Education provided to: plan of care  Discussed plan of care with Patient and Nurse    Cecil Fabian RN CWOCN

## 2021-01-08 NOTE — PLAN OF CARE
Pt A/Ox2, pt confused, but redirectable at times, up with assist of 1 at the bedside.  CIWA score 2 & 5.  Pt denies pain, N/V, SOB, lightheadedness, and dizziness. IV is saline locked. VSS.  Reg diet, but refused meal this evening.  Plan to discharge back home TBD. Continue with POC.    Major Shift Event/Provider Notifications:  Pt up out of bed multiple times during shift, needs reorienting.  Pt becoming more restless.

## 2021-01-08 NOTE — PLAN OF CARE
Presentation/Diagnosis:CIWA, alcohol cirrhosis, Confusion  History:Alcohol abuse, DM, Kidney and live failure,   Labs/Protocols:CIWA 3. Bili 21.2 Ammonia 82, , Creat 1.76, Albumin 1.6, Lactic 2.1. Phos 3.5  Vitals: /47 (BP Location: Left arm)   Pulse 94   Temp 96.3  F (35.7  C) (Oral)   Resp 16   Wt 87.1 kg (192 lb)   SpO2 99%   BMI 30.99 kg/m     Respiratory:RA sating 99%  Neuro:confused, impulsive at times, disoriented to time, situation. Restless. Sitter at bedside. Follows commands intermittently.    GI/:Inc of B&B  Skin:jaundice, brusing, thora site on right side, woc saw pt today and said skin is fine   LDAs:PIV SL  Diet:2g NA diet   Activity:A1 walker GB  Teaching:pt educated on plan of care   Plan:CD called and signed off since pt is too lethargic and confused. If CD needs to see pt for discharge planning, new consult needs to be place. Lactulose is scheduled TID. Abd. Is distended. Multiple BMs this shift. Last BM had blood. There was small to moderate amount of blood in the patients brief, MD notified, Checking hgb. Per MD most likely internal hemorrhoids. GI saw pt this afternoon. Will cont with POC.

## 2021-01-08 NOTE — PROGRESS NOTES
X cover    Called about LA of 3.7.    Patient has liver disease and underwent paracentesis, blood pressure ok and patient not tachycardic and no fever.    Will repeat LA in AM but suspect this is due to underlying liver disease.      Danny Clemente MD

## 2021-01-08 NOTE — PROGRESS NOTES
01/08/21 1118   Quick Adds   Type of Visit Initial PT Evaluation   Living Environment   People in home significant other   Living Environment Comments difficulty providing PLOF secondary to lethargy/confusion   Self-Care   Usual Activity Tolerance   (unknown)   Current Activity Tolerance poor   Disability/Function   Hearing Difficulty or Deaf yes   Patient's preferred means of communication verbal   Number of times patient has fallen within last six months 3   Change in Functional Status Since Onset of Current Illness/Injury yes  (impaired gait/transfers)   General Information   Onset of Illness/Injury or Date of Surgery 01/03/21   Referring Physician Hakan Coughlin MD   Patient/Family Therapy Goals Statement (PT) not stated   Pertinent History of Current Problem (include personal factors and/or comorbidities that impact the POC) per chart: Per chart: Pt is a 65 year old male admitted with liver failure; see medical record for further information   Existing Precautions/Restrictions fall   General Observations significant bruising of R LE   Cognition   Orientation Status (Cognition) person;place   Cognitive Status Comments significant impairment during session; defer to OT for further testing   Pain Assessment   Patient Currently in Pain No   Integumentary/Edema   Integumentary/Edema Comments significant bruising of R LE noted; see nursing notes for further information   Posture    Posture Comments forward flexed in sitting at EOB; leans L    Range of Motion (ROM)   ROM Comment able to move UE's and LE's; appears functional   Strength   Strength Comments appears to have significant generalized weakness with bed mobility; able to move arms and legs against gravity   Bed Mobility   Comment (Bed Mobility) mod A for supine to sit at EOB; min A for rolling; use of bedrail; to assist; no dizziness in sitting; VSS;   Transfers   Transfer Safety Comments unable to tolerate; patient attempting to return to  sidelying/supine   Gait/Stairs (Locomotion)   Comment (Gait/Stairs) walked to bathroom slowly with nursing this am; unable to assess during session; kept returning to sidelying   Balance   Balance Comments leans L in sitting but able to maintain static sitting with CGA   Clinical Impression   Criteria for Skilled Therapeutic Intervention yes, treatment indicated   PT Diagnosis (PT) impaired gait/transfers; weakness   Influenced by the following impairments weakness; confusion; decreased command following; decreased activity tolerance; impaired balance   Functional limitations due to impairments impaired independence with functional mobility   Clinical Presentation Evolving/Changing   Clinical Presentation Rationale clinical judgement; level of assist   Clinical Decision Making (Complexity) moderate complexity   Therapy Frequency (PT) 4x/week   Predicted Duration of Therapy Intervention (days/wks) 7 days   Planned Therapy Interventions (PT) bed mobility training;gait training;strengthening;transfer training   Anticipated Equipment Needs at Discharge (PT) walker, rolling   Risk & Benefits of therapy have been explained evaluation/treatment results reviewed;care plan/treatment goals reviewed;risks/benefits reviewed;current/potential barriers reviewed;participants included   PT Discharge Planning    PT Discharge Recommendation (DC Rec) Transitional Care Facility   PT Rationale for DC Rec Unknown baseline level of function; currently very limited mobility and decreased command following; min/mod A for bed mobility; walked slowly with nursing; would currently need 24/7 assist for all mobility and cares; would benefit from PT while hospitalized and TCU at discharge to maximize return to PLOF   PT Brief overview of current status  min/mod A to come to sit at EOB; declined to do further mobility; poor command following   Total Evaluation Time   Total Evaluation Time (Minutes) 10

## 2021-01-08 NOTE — PROGRESS NOTES
GASTROENTEROLOGY PROGRESS NOTE        SUBJECTIVE:  More clear today. Able to deny abdominal pain. 2 bowel movements since starting lactulose yesterday.      OBJECTIVE:    /71 (BP Location: Right arm)   Pulse 96   Temp 95.4  F (35.2  C) (Oral)   Resp 16   Wt 87.1 kg (192 lb)   SpO2 100%   BMI 30.99 kg/m    Temp (24hrs), Av.5  F (35.8  C), Min:95.4  F (35.2  C), Max:97.5  F (36.4  C)    Patient Vitals for the past 72 hrs:   Weight   21 0402 87.1 kg (192 lb)   21 0615 87.8 kg (193 lb 9.6 oz)       Intake/Output Summary (Last 24 hours) at 2021 1120  Last data filed at 2021 0930  Gross per 24 hour   Intake 820 ml   Output --   Net 820 ml        PHYSICAL EXAM     Constitutional: NAD, confused  Abdomen: soft, NTTP  Unable to assess for asterixis         Additional Comments:  ROS, FH, SH: See initial GI consult for details.     I have reviewed the patient's new clinical lab results:     Recent Labs   Lab Test 21  0635 21  0655 21  0634 21  0807 21  1544 21  1544   WBC 21.1* 15.7* 14.4* 12.6*   < > 11.3*   HGB 10.8* 10.4* 11.3* 9.8*   < > 11.0*   * 105* 103* 104*   < > 106*    206 191 166   < > 181   INR  --   --  2.76* 2.97*  --  2.61*    < > = values in this interval not displayed.     Recent Labs   Lab Test 21  0635 21  0655 21  0634   POTASSIUM 3.7 3.9 4.4   CHLORIDE 97 96 94   CO2 21 23 22   BUN 51* 48* 42*   ANIONGAP 7 6 7     Recent Labs   Lab Test 21  0635 21  0655 21  0634 21  1544   ALBUMIN 1.6* 1.4* 1.4*   < >  --  1.5*   BILITOTAL 21.2* 19.6* 20.7*   < >  --  21.8*   ALT 71* 56 48   < >  --  42   * 117* 120*   < >  --  Canceled, Test credited   PROTEIN  --   --   --   --  10*  --    LIPASE  --   --   --   --   --  706*    < > = values in this interval not displayed.        ASSESSMENT/ PLAN  Pramod Harris is a 65-year-old male who was admitted  1/3/21 after presenting with multiple symptoms including abdominal distention, lower extremity edema, and jaundice. Found to have multiple lab abnormalities (hyponatremia, hyperbilirubinemia, elevated creatinine, elevated INR) and US showed cirrhosis with large volume ascites.     1. Cirrhosis with ascites: Etiology unclear, but suspect alcohol. Elevated LFTs most likely represent acute alcohol hepatitis on chronic liver disease.  and so started on prednisolone. MELD-Na 35 on admission. Hepatitis A/B/C negative. Paracentesis performed 1/4 with 5.4 L removed, negative for SBP. Bilirubin worsening- 21 today. Creatinine 1.76          - Trend LFTs, INR, renal function.        - Repeat paracentesis PRN.        - Holding diuretics for now due to YVONNE and hyponatremia. Can start Lasix/Aldactone when renal function improves.        - Prednisolone 40 mg daily for alcohol hepatitis; will calculate Lille score on day 7 (1/10).        - Increase lactulose to 20 mg TID. Will add rifaximin today as goal for BMs daily 3-4.         - Low sodium, high protein diet.        - Alcohol cessation support.    2. Leukocytosis: may be related to steroids vs inflammatory process vs infection. Continue to monitor. Ascitic fluid gram stain and fluid culture without growth.  Infectious work up per medicine      Discussed with Dr. Stevenson.  Catherine Mesa PA-C  [facility] ( [facility])

## 2021-01-08 NOTE — PROVIDER NOTIFICATION
/ Analia notified Pt in 348 EC, blood found in his brief, SMall to moderate amount. Bright red. Pt is up on the commode now
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
## 2021-01-08 NOTE — PROGRESS NOTES
01/08/21 1047   Quick Adds   Type of Visit Initial Occupational Therapy Evaluation   Living Environment   Living Environment Comments Pt unable to provide PLOF due to lethargy/confusion. Chart indicates pt does have significant other.    Self-Care   Current Activity Tolerance poor   Disability/Function   Hearing Difficulty or Deaf yes   Concentrating, Remembering or Making Decisions Difficulty yes   Difficulty Communicating no   Difficulty Eating/Swallowing no   Walking or Climbing Stairs Difficulty yes   Walking or Climbing Stairs ambulation difficulty, requires equipment   Dressing/Bathing Difficulty no   Toileting issues no   Doing Errands Independently Difficulty (such as shopping) yes   Fall history within last six months yes   Number of times patient has fallen within last six months 3   Change in Functional Status Since Onset of Current Illness/Injury yes   General Information   Onset of Illness/Injury or Date of Surgery 01/03/21   Referring Physician Hakan Coughlin MD   Patient/Family Therapy Goal Statement (OT) Not stated by patient   Additional Occupational Profile Info/Pertinent History of Current Problem Per chart: Pt is a 65 year old male admitted with liver failure.   Performance Patterns (Routines, Roles, Habits) Unknown PLOF as pt unable to state. Chart indicates uses a walker at baseline.    Existing Precautions/Restrictions fall   Cognitive Status Examination   Orientation Status person   Affect/Mental Status (Cognitive) confused;low arousal/lethargic   Cognitive Status Comments Pt lethargic, difficulty answering questions, difficulty following commands accurately.    Pain Assessment   Patient Currently in Pain Yes, see Vital Sign flowsheet  (wincing when moving RLE)   Range of Motion Comprehensive   Comment, General Range of Motion BUEs WFL, unable to trial MMT   Strength Comprehensive (MMT)   Comment, General Manual Muscle Testing (MMT) Assessment Generalized weakness BUEs   Bed Mobility    Supine-Sit [place] (Bed Mobility) moderate assist (50% patient effort)   Sit-Supine [place] (Bed Mobility) moderate assist (50% patient effort)   Transfers   Transfer Comments Unable to safely attempt transfer   Balance   Balance Comments Was able to sit EOB with CGA, repeatedly trying to return to supine.    Activities of Daily Living   BADL Assessment/Intervention upper body dressing   Upper Body Dressing Assessment/Training   [place] Level (Upper Body Dressing) maximum assist (25% patient effort)   Clinical Impression   Criteria for Skilled Therapeutic Interventions Met (OT) yes;meets criteria;skilled treatment is necessary   OT Diagnosis Impaired ADLs, IADLs and mobility tasks   OT Problem List-Impairments impacting ADL problems related to;activity tolerance impaired;balance;cognition;strength;pain   ADL comments/analysis Pt below baseline   Assessment of Occupational Performance 5 or more Performance Deficits   Identified Performance Deficits Bathing, dressing, grooming, toileting, homemaking, transfers   Planned Therapy Interventions (OT) ADL retraining;IADL retraining;strengthening;transfer training;cognition   Clinical Decision Making Complexity (OT) moderate complexity   Therapy Frequency (OT) 4x/week   Predicted Duration of Therapy 1 week   Risks and Benefits of Treatment have been explained. Yes   Patient, Family & other staff in agreement with plan of care Yes   OT Discharge Planning    OT Discharge Recommendation (DC Rec) Transitional Care Facility   OT Rationale for DC Rec Recommend ongoing skilled OT while IP and in TCU setting to improve strength, functional activity tolerance, balance and safety needed for daily tasks.    OT Brief overview of current status  Unable to safely attempt transfers this session.    Total Evaluation Time (Minutes)   Total Evaluation Time (Minutes) 8

## 2021-01-08 NOTE — PROVIDER NOTIFICATION
Dr. Helms notified Pt in [address] EC is complaining the his bottom hurts, when he got off the commode there was a small clot when wiping.

## 2021-01-08 NOTE — CONSULTS
Care Management Initial Consult    General Information  Assessment completed with: Pt's CANELO Romero (pt unable to converse at this time)     Communication Assessment  Patient's communication style: spoken language (English or Bilingual)    Hearing Difficulty or Deaf: yes   Wear Glasses or Blind: other (see comments)(reading classes)    Cognitive  Cognitive/Neuro/Behavioral: .WDL except  Level of Consciousness: alert, confused  Arousal Level: opens eyes spontaneously  Orientation: disoriented to, time, situation  Mood/Behavior: restless  Best Language: 0 - No aphasia  Speech: clear    Living Environment:   People in home:     S.ROLLYJeanne Romero x 15 years.  Current living Arrangements:     House  Able to return to prior arrangements:  Not at this time.     Family/Social Support:  Care provided by:  Heather  Provides care for:  No One                Description of Support System:    Supportive and involved       Current Resources:   Skilled Home Care Services:  N/A  Community Resources:  N/A  Equipment currently used at home: walker, standard    Lifestyle & Psychosocial Needs:      CANELO Romero. 3 sons and a sister in [place].  Tobacco Use     Smoking status: Never Smoker     Smokeless tobacco: Never Used   Substance and Sexual Activity     Alcohol use: Yes     Comment: 4-5 beers/ 2-3 times per week     Drug use: No             Discharge Date: TBD       Discharge Disposition: TCU    Discharge Services: [facility]    Discharge DME: None    Discharge Transportation: family will provide    PAS Confirmation Code:    Patient/family educated on Medicare website which has current facility and service quality ratings: Yes    Education Provided on the Discharge Plan:  KAREN spoke with CANELO Romero and discussed TCU upon discharge.    TCU referrals made to:  [facility]  [facility]  [facility]     Please confirm choices with Heather if they have availability as she is  researching each.    Dimas LEWIS Case Management  Inpatient   M[facility] Child and ED  [facility]    [phone]

## 2021-01-08 NOTE — PLAN OF CARE
VSS. Pt disoriented to time/place/situation, very restless and impulsive at the beginning of the night. Starting stooling, incontinent and continent, large amounts. Does not always follow commands or answer questions appropriately. Lungs clear, skin yellow. Sitter initiated 0430, pt has been resting off/on but remains a high fall risk, continues to be impulsive.   Ax1-2. Lowfat/low sodium diet.
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
## 2021-01-08 NOTE — PLAN OF CARE
OT maggy attempted - pt currently sleeping. Per discussion with RN/sitter, requesting to return later when pt awake to allow time to rest.

## 2021-01-09 PROBLEM — N17.9 ACUTE KIDNEY FAILURE, UNSPECIFIED (H): Status: ACTIVE | Noted: 2021-01-01

## 2021-01-09 NOTE — PROVIDER NOTIFICATION
This writer spoke with Astrid in Lab. Pt lactic acid 3.9 this am. This writer informed Primary RN [place] HERNANDEZ And web paged Dr. Coughlin. Lactic yesterday was 3.7    MD ordered IV Albumin

## 2021-01-09 NOTE — PROGRESS NOTES
CLINICAL NUTRITION SERVICES - BRIEF NOTE    - Refer to RD assessment completed yesterday.  - Minimal PO intakes since admission (meeting <50% needs) given withdrawal, in combination with underlying cirrhosis/decreased appetite/suspect early satiety with ascites.    - W/in the past 24-48 hrs either refusing meals or taking small amounts/bites.  Discussed POC with MD, likely need to consider short-term EN if do not suspect clinical progression w/in the next 24-48 hrs.  MD to formally consult if additional nutrition interventions pursued.  - Will continue following.        Abida Kaiser RDN, LD  Clinical Dietitian  3rd floor/ICU: [phone]  All other floors: [phone]  Weekend/holiday: [phone]

## 2021-01-09 NOTE — PROVIDER NOTIFICATION
This writer spoke with Primary RN [place] nurse, patient lactic acid is 4.2. this information was web paged to Dr. Madyson GARLAND ordered 500 bolus NS

## 2021-01-09 NOTE — PLAN OF CARE
VSS. Sitter at bedside. Alert to self. CIWAs 0-1. Pt was restless and received 12.5mg Seroquel with some relief. Diet: 2g Na. Ax2 w/ walker & gb stands at bedside. Pt in incont of bowel and bladder. Pt is taking lactulose, had bright red stools. Pt is inconsistent with following commands. Will cont plan of care.

## 2021-01-09 NOTE — PLAN OF CARE
Pt confused, restless, but does respond to his name, inconsistent with following commands. VSS, pt appeared uncomfortable, administered 1x dose of Tylenol. Gave meds with pudding. Lactate levels 3.9, 4.2 - MD notified, administered Albumin and NS Bolus. PT, OT, WOC & Speech following. Will continue with plan of care.

## 2021-01-09 NOTE — CONSULTS
RENAL CONSULTATION NOTE    REFERRING MD:  Hakan Coughlin MD    REASON FOR CONSULTATION:  worsening renal failure    HPI:  65 y.o man with alcohol abuse, history of SDH, diabetes and depression, who came in for increasing abdominal distention. Patient is confused. He is unable to provide much information. I reviewed his chart, labs and imaging studies in epic. I discussed the case with the medical staff.     Per Dr. Rahman's admission H&P:   Evidently, the patient has been doing poorly for about 1 week.  He has had gradually less oral intake over the past 1 week.  His abdomen and legs have been increasing in size.  His skin has become more yellow and he is also becoming more icteric.  It is now quite notable and apparent.   His urine has been dark now for a few weeks as well.  He was evidently seen by some sort of provider about a week ago upon the urging of his significant other who was concerned about possibly prostate issues.  The patient left the clinic, according to her report, with an albuterol inhaler.  She is not exactly sure what took place during the meeting.  Last Monday, he was seen by his ophthalmologist who noticed scleral icterus and recommended he follow-up in clinic for this issue.  According to his significant other, he did not follow-up.  He has continued to deteriorate since that time and gradually failing at home.  He evidently had a fall yesterday with very unclear details.  The patient does not particularly remember this episode.  His significant other went to see him today and was quite concerned about his condition was brought him to the Emergency Department for evaluation.      Here in the Emergency Department, his vital signs are stable with temperature 97.8, heart rate 81, blood pressure 124/71, respiratory rate 16 and oxygen saturation 99% on room air.     ROS:  A complete review of systems was performed and is negative except as noted above.    PMH:  History reviewed. No pertinent  past medical history.    PSH:    Past Surgical History:   Procedure Laterality Date     COLONOSCOPY  11/06/2017    Dr. Barton [facility]     ENT SURGERY      T&A as a child     ESOPHAGOSCOPY, GASTROSCOPY, DUODENOSCOPY (EGD), COMBINED  06/2017    [facility]       MEDICATIONS:      acetaminophen  500 mg Oral Once     albumin human  25 g Intravenous Q8H     folic acid  1 mg Oral Daily     lactulose  10 g Oral TID     multivitamin w/minerals  1 tablet Oral Daily     pantoprazole  40 mg Oral QAM AC     prednisoLONE  40 mg Oral Daily     rifaximin  550 mg Oral BID     thiamine  100 mg Oral TID    Followed by     [START ON 1/11/2021] thiamine  100 mg Oral Daily       ALLERGIES:    Allergies as of 01/03/2021     (No Known Allergies)       FH:    Family History   Problem Relation Age of Onset     Colon Cancer No family hx of        SH:    Social History     Socioeconomic History     Marital status:      Spouse name: Not on file     Number of children: Not on file     Years of education: Not on file     Highest education level: Not on file   Occupational History     Not on file   Social Needs     Financial resource strain: Not on file     Food insecurity     Worry: Not on file     Inability: Not on file     Transportation needs     Medical: Not on file     Non-medical: Not on file   Tobacco Use     Smoking status: Never Smoker     Smokeless tobacco: Never Used   Substance and Sexual Activity     Alcohol use: Yes     Comment: 4-5 beers/ 2-3 times per week     Drug use: No     Sexual activity: Not on file   Lifestyle     Physical activity     Days per week: Not on file     Minutes per session: Not on file     Stress: Not on file   Relationships     Social connections     Talks on phone: Not on file     Gets together: Not on file     Attends [employer]ism service: Not on file     Active member of club or organization: Not on file     Attends meetings of clubs or organizations: Not on file     Relationship status: Not on  file     Intimate partner violence     Fear of current or ex partner: Not on file     Emotionally abused: Not on file     Physically abused: Not on file     Forced sexual activity: Not on file   Other Topics Concern     Parent/sibling w/ CABG, MI or angioplasty before 65F 55M? Not Asked   Social History Narrative     Not on file       PHYSICAL EXAM:    /52 (BP Location: Right arm)   Pulse 83   Temp 96  F (35.6  C) (Axillary)   Resp 18   Wt 85.3 kg (188 lb)   SpO2 100%   BMI 30.34 kg/m    GENERAL: Sitting in the chair. Looks sick.   HEENT:  Normocephalic. No gross abnormalities.  Pupils equal.  MMM.  Sclera icterus.   CV: RRR, no murmurs, no clicks, gallops, or rubs, no edema  RESP: Clear bilaterally with good efforts. No wheezes or crackles. Breathing is non-labor.   GI: Abdomen distended, soft, NT.   MUSCULOSKELETAL: extremities nl - no gross deformities noted. No edema.   SKIN: no suspicious lesions or rashes, dry to touch. Jaundice. Bruise R inner thigh.   NEURO:  Generalized weakness. Confused. Unable to provide history.   PSYCH: Unable to evaluate.   LYMPH: No palpable ant/post cervical     LABS:      CBC RESULTS:     Recent Labs   Lab 01/09/21  0705 01/08/21  1246 01/08/21  0635 01/07/21  0655 01/06/21  0634 01/05/21  0807 01/04/21  0638   WBC 24.5*  --  21.1* 15.7* 14.4* 12.6* 12.0*   RBC 2.62*  --  2.81* 2.71* 2.94* 2.55* 2.77*   HGB 10.1* 10.5* 10.8* 10.4* 11.3* 9.8* 10.8*   HCT 28.5*  --  30.0* 28.5* 30.4* 26.4* 28.9*     --  197 206 191 166 173       BMP RESULTS:  Recent Labs   Lab 01/09/21  0705 01/08/21  0635 01/07/21  0655 01/06/21  0634 01/05/21  0807 01/04/21  0638   * 125* 125* 123* 124* 124*   POTASSIUM 4.0 3.7 3.9 4.4 4.1 3.8   CHLORIDE 96 97 96 94 95 94   CO2 19* 21 23 22 22 21   BUN 64* 51* 48* 42* 38* 32*   CR 2.71* 1.76* 1.68* 1.61* 1.62* 1.41*   * 107* 110* 115* 121* 90   OVIDIO 8.2* 8.8 8.5 8.4* 8.1* 8.1*       INR  Recent Labs   Lab 01/06/21  0634 01/05/21  0807  01/03/21  1544   INR 2.76* 2.97* 2.61*        DIAGNOSTICS:  Reviewed    A/P:  65 y.o man with alcohol abuse, admitted for acute alcoholic hepatitis, consulted for worsening kidney injury.     # Patient with a serum creatinine of 0.62 mg/dl on June of 2019. No other recent labs.     # Severe acute kidney injury: Admitted on January 3rd with serum creatinine of 1.34 mg/dl. Gradually declining since admission, but more so last two days with worsening lactic acidosis and leukocytosis.    -Luis Carlos <5 and UA with hyaline and granular casts.      # Large volume ascites s/p 5.4 liters paracentesis on January 4th. He is having a lot of abdominal pain. Peritonitis?    # Alcoholic liver cirrhosis with acute alcoholic hepatitis:   -on prednisone   -manage per GI    # Severe hyperbilirubinemia due to above.     # Hyponatremia due to liver cirrhosis.     # Severe hypoalbuminemia    # Peritonitis? On Rocephin.     Plan:   # Check UA, FENa, UPCR, and urine sodium again  # Renal ultrasound  # Albumin 50 grams bid  X 2 doses for today  # Start midodrine 10 mg tid and octreotide 100 mg subcutaneous q8hrs  # Will treat for HRS-1 with albumin, midodrine and octreotide  # Please have palliative care involve as his prognosis and outcome are very poor    He is a very, very poor candidate for renal replacement therapy. Please call with questions or concerns.     Sanjiv Trent MD  [facility]phrology  Office Phone: [phone]  Pager: [phone]
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
## 2021-01-09 NOTE — PROGRESS NOTES
GASTROENTEROLOGY PROGRESS NOTE    CC: Liver cirrhosis with encephalopathy and worsening kidney function, elevated WBC count    SUBJECTIVE:  Patient unable to provide any meaningful conversation.  Clearly encephalopathic which is a change from yesterday per notes    OBJECTIVE:  General Appearance:  Well nourished and confused  /58 (BP Location: Left arm)   Pulse 91   Temp 96.3  F (35.7  C) (Axillary)   Resp 15   Wt 85.3 kg (188 lb)   SpO2 99%   BMI 30.34 kg/m    Temp (24hrs), Av.7  F (35.9  C), Min:96  F (35.6  C), Max:97.6  F (36.4  C)    Patient Vitals for the past 72 hrs:   Weight   21 0628 85.3 kg (188 lb)   21 0402 87.1 kg (192 lb)       Intake/Output Summary (Last 24 hours) at 2021 1629  Last data filed at 2021 1318  Gross per 24 hour   Intake 820 ml   Output --   Net 820 ml       PHYSICAL EXAM  Abdomen: Abdomen soft, non-tender. BS normal. No masses, organomegaly        Additional Comments:  ROS, FH, SH: See initial GI consult for details.    I have reviewed the patient's new clinical lab results:    Recent Labs   Lab Test 21  0705 21  1246 21  0635 21  0655 21  0634 21  0807 21  1544 21  1544   WBC 24.5*  --  21.1* 15.7* 14.4* 12.6*   < > 11.3*   HGB 10.1* 10.5* 10.8* 10.4* 11.3* 9.8*   < > 11.0*   *  --  107* 105* 103* 104*   < > 106*     --  197 206 191 166   < > 181   INR  --   --   --   --  2.76* 2.97*  --  2.61*    < > = values in this interval not displayed.     Recent Labs   Lab Test 21  0705 21  0635 21  0655   POTASSIUM 4.0 3.7 3.9   CHLORIDE 96 97 96   CO2 19* 21 23   BUN 64* 51* 48*   ANIONGAP 7 7 6     Recent Labs   Lab Test 21  0705 21  0635 21  0655 21  1544   ALBUMIN 1.5* 1.6* 1.4*   < >  --  1.5*   BILITOTAL 20.7* 21.2* 19.6*   < >  --  21.8*   ALT 80* 71* 56   < >  --  42   * 136* 117*   < >  --  Canceled, Test  credited   PROTEIN  --   --   --   --  10*  --    LIPASE  --   --   --   --   --  706*    < > = values in this interval not displayed.         Assessment/Plan  Severe end stage liver disease  Hepatic encephalopathy secondary to liver cirrhosis   Compromised kidney function  Elevated WBC count ? SBP    Agree with midodrine and octreotide  Continue rifaxamin and lactulose. Do not hold for diarrhea  Antibiotics      Milvia Mejias MD  [facility]  Office

## 2021-01-10 NOTE — PROGRESS NOTES
Renal Medicine Progress Note            Assessment/Plan:     65 y.o man with alcohol abuse, admitted for acute alcoholic hepatitis, consulted for worsening kidney injury.      # Patient with a serum creatinine of 0.62 mg/dl on June of 2019. No other recent labs.      # Severe acute kidney injury: Admitted on January 3rd with serum creatinine of 1.34 mg/dl. Gradually declining since admission. Treating for HRS-1.                -Luis Carlos <5 and UA with hyaline and granular casts.     -UPCR <500 mg/dl.                  # Large volume ascites s/p 5.4 liters paracentesis on January 4th. He is having a lot of abdominal pain. Peritonitis?     # Alcoholic liver cirrhosis with acute alcoholic hepatitis:               -on prednisone               -manage per GI     # Severe hyperbilirubinemia due to above.      # Hyponatremia due to liver cirrhosis.      # Severe hypoalbuminemia:      # Peritonitis? On Rocephin.      Plan:   # Renal ultrasound  # Increase octreotide to 200 mg subcutaneous tid  # Cont albumin infusion and midodrine     He is a very, very poor candidate for renal replacement therapy. His prognosis is guarded. Please call with questions or concerns.          Interval History:      Afebrile. BP is low. O2 sat is okay. Pt is more alert and oriented today. He feels uneasy. He denies worsening shortness of breath. No vomiting.           Medications and Allergies:       albumin human  25 g Intravenous BID     cefTRIAXone  1 g Intravenous Q24H     folic acid  1 mg Oral Daily     lactulose  10 g Oral TID     midodrine  10 mg Oral TID w/meals     multivitamin w/minerals  1 tablet Oral Daily     octreotide  100 mcg Intravenous Q8H     pantoprazole  40 mg Oral QAM AC     prednisoLONE  40 mg Oral Daily     rifaximin  550 mg Oral BID     thiamine  100 mg Oral TID    Followed by     [START ON 1/11/2021] thiamine  100 mg Oral Daily      No Known Allergies         Physical Exam:   Vitals were reviewed   , Blood pressure 102/57,  pulse 85, temperature 96  F (35.6  C), temperature source Axillary, resp. rate 18, weight 85.3 kg (188 lb), SpO2 98 %.    Wt Readings from Last 3 Encounters:   01/09/21 85.3 kg (188 lb)   08/16/19 91.2 kg (201 lb)   08/01/19 91.2 kg (201 lb)       Intake/Output Summary (Last 24 hours) at 1/10/2021 1355  Last data filed at 1/10/2021 1234  Gross per 24 hour   Intake --   Output 650 ml   Net -650 ml       GENERAL APPEARANCE: looks ill  HEENT:  EOMI. OMM. Sclera icterus.   RESP: lungs cta b c good efforts, no crackles, rhonchi or wheezes  CV: RRR, nl S1/S2,   ABDOMEN: distended, soft, some tenderness.   EXTREMITIES/SKIN: no edema. Jaundice  NEURO: More alert today. Answering questions.          Data:     CBC RESULTS:     Recent Labs   Lab 01/10/21  0632 01/09/21  0705 01/08/21  1246 01/08/21  0635 01/07/21  0655 01/06/21  0634 01/05/21  0807   WBC 18.8* 24.5*  --  21.1* 15.7* 14.4* 12.6*   RBC 2.28* 2.62*  --  2.81* 2.71* 2.94* 2.55*   HGB 8.8* 10.1* 10.5* 10.8* 10.4* 11.3* 9.8*   HCT 24.6* 28.5*  --  30.0* 28.5* 30.4* 26.4*   * 170  --  197 206 191 166       Basic Metabolic Panel:  Recent Labs   Lab 01/10/21  0632 01/09/21  0705 01/08/21  0635 01/07/21  0655 01/06/21  0634 01/05/21  0807   * 122* 125* 125* 123* 124*   POTASSIUM 3.8 4.0 3.7 3.9 4.4 4.1   CHLORIDE 95 96 97 96 94 95   CO2 23 19* 21 23 22 22   BUN 77* 64* 51* 48* 42* 38*   CR 3.14* 2.71* 1.76* 1.68* 1.61* 1.62*   * 109* 107* 110* 115* 121*   OVIDIO 8.2* 8.2* 8.8 8.5 8.4* 8.1*       INR  Recent Labs   Lab 01/10/21  0632 01/06/21  0634 01/05/21  0807 01/03/21  1544   INR 4.01* 2.76* 2.97* 2.61*      Attestation:   I have reviewed today's relevant vital signs, notes, medications, labs and imaging.    Sanjiv Trent MD  [facility]  Office phone :[phone]  Pager: [phone]
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
## 2021-01-10 NOTE — PLAN OF CARE
VSS, slightly hypertensive. Ax2 w/ walker & gb. Pt did not ambulate this shift, and appeared to be more lethargic than the previous night. Pt was unable to void, bladder scan was 600cc(hard to know accuracy dt ascites) & was straight cathed for 500cc. Will cont plan of care.

## 2021-01-10 NOTE — PROGRESS NOTES
SLP - Swallow evaluation completed. Full note to follow. RN states pt needs cues to swallow, chews medications, pockets solids.     Recommend - downgrade diet to Dysphagia Diet Level 1 (Puree) with THIN liquids. Close supervision as pt tends to hold solids in oral cavity. Meds whole with water as able, otherwise applesauce/pudding/ice cream. Crush as needed/able.     SLP will follow 5x/wk.

## 2021-01-10 NOTE — PROGRESS NOTES
GASTROENTEROLOGY PROGRESS NOTE    CC: Alcoholic hepatitis on steroids Day 6    cirrhosis with ascites and encephalopathy   Elevated WBC possible SBP    SUBJECTIVE:  Conversant today which is an improvement from yesterday   Nursing still wanting to hold lactulose    OBJECTIVE:  General Appearance:  Jaundiced, confused with asterixis  /57 (BP Location: Right arm)   Pulse 85   Temp 96  F (35.6  C) (Axillary)   Resp 18   Wt 85.3 kg (188 lb)   SpO2 98%   BMI 30.34 kg/m    Temp (24hrs), Av  F (35.6  C), Min:96  F (35.6  C), Max:96  F (35.6  C)    Patient Vitals for the past 72 hrs:   Weight   21 0628 85.3 kg (188 lb)       Intake/Output Summary (Last 24 hours) at 1/10/2021 1536  Last data filed at 1/10/2021 1234  Gross per 24 hour   Intake --   Output 650 ml   Net -650 ml       PHYSICAL EXAM  Abdomen: Abdomen more distended but  soft, non-tender. BS normal. No masses, organomegaly        Additional Comments:  ROS, FH, SH: See initial GI consult for details.    I have reviewed the patient's new clinical lab results:    Recent Labs   Lab Test 01/10/21  0632 21  0705 21  1246 21  0635 21  0634 21  0634 21  0807   WBC 18.8* 24.5*  --  21.1*   < > 14.4* 12.6*   HGB 8.8* 10.1* 10.5* 10.8*   < > 11.3* 9.8*   * 109*  --  107*   < > 103* 104*   * 170  --  197   < > 191 166   INR 4.01*  --   --   --   --  2.76* 2.97*    < > = values in this interval not displayed.     Recent Labs   Lab Test 01/10/21  0632 21  0705 21  0635   POTASSIUM 3.8 4.0 3.7   CHLORIDE 95 96 97   CO2 23 19* 21   BUN 77* 64* 51*   ANIONGAP 8 7 7     Recent Labs   Lab Test 01/10/21  0632 01/10/21  0558 21  0705 21  0635 21  1544   ALBUMIN 2.5*  --  1.5* 1.6*   < >  --  1.5*   BILITOTAL 20.0*  --  20.7* 21.2*   < >  --  21.8*   ALT 60  --  80* 71*   < >  --  42   AST 82*  --  148* 136*   < >  --  Canceled, Test credited   PROTEIN  --   10*  --   --   --  10*  --    LIPASE  --   --   --   --   --   --  706*    < > = values in this interval not displayed.         Active Problems:    Alcoholic hepatitis on steroids , Day 6     Cirrhosis with end stage liver disease, ? SBP  Midodrine , octreotide and rocephin  May need paracentsis    Encephalopathy  Lactulose and rifaximin      Milvia Mejias MD  [facility]  Office

## 2021-01-10 NOTE — PLAN OF CARE
Pt Alert to self, more clear today, able to answer questions and follow commands. VSS; pt denies pain, headache, dizziness, N/V & SOB. PT, OT, WOC & Speech following. Will continue with plan of care.
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
## 2021-01-11 NOTE — PROGRESS NOTES
Patient tolerated paracentesis well.  3900 mL of dark straw fluid drained done by Dietrich  Dressing and glue with no drainage.  NO albumin given.  Consent on file. Patient back to nursing unit via cart, report called to receiving RN.  Samples collected were brought to lab.  Cristiane Rico, RN, BSN

## 2021-01-11 NOTE — PROGRESS NOTES
NUTRITION BRIEF NOTE     RD following for poor PO intake - ongoing with bites at best for meals. Paracentesis today with 3900 mL removed. INR >4 1/10. RD discussed with MD 1/9.    Recommendations: consider nasoenteric feeding tube placement and TF initiation during acute illness - would be considered a high risk placement due to elevated INR. Oral nutrition supplements with encouragement for frequent bites/sips as tolerated.    Will continue to follow per protocol. Please page/consult as needed.     Kita Ramos MS, RDN-AP, LD, CNSC  Pager - 3rd floor/ICU: [phone]  Pager - All other floors: [phone]  Pager - Weekend/holiday: [phone]  Office: [phone]

## 2021-01-11 NOTE — CONSULTS
[facility]    Palliative Care Consultation   Text Page    Date of Admission:  1/3/2021    Assessment & Plan   Pramod Harris is a 65 year old male who was admitted on 1/3/2021. I was asked by Hospitalist Hakan Coughlin MD to see the patient for goals of care.    Recommendations:  1.  Decisional Capacity -  Unreliable. Patient does not have an advance directive. Per  informed consent policy next of kin should be involved in all consent and decision making. The patient has three children Fito, Stuart and Maxine who are next of kin. He has been estranged from Stuart and Maxine, but Fito indicates they have been notified about the patient's hospitalization.     2.  Encephalopathy - Due to end stage liver disease. Continue lactulose and rifaximin per [place] GI recommendations. Patient does not currently demonstrate medical capacity.     3.  Dysphagia - Appreciate input of Speech Therapist Brady Mesa, SLP recommendation for Dysphagia Diet Level 1 with thin liquids.      4.  Oral intake not meeting nutritional needs - Appreciate input of Registered Dietitian Abida Willingham, RD, LD. Son agrees with plan for enteral nutrition.    5.  Abdominal pain - Patient currently denies. Status post paracentesis 1/4/2021 for large volume ascites and underwent second paracentesis of 3,900 mL earlier today.     6.  Generalized weakness - Therapy following and TCU recommended due to debilitated state.      7.  Spiritual Care - Spiritual Health services declined at this time.     8.  Care Planning - Appreciate input of  DEYVI Craig    Goals of Care: FULL CODE - Restorative care. Son is interested in pursuing TUBE FEEDING.    Disease Process/es & Symptoms:  Pramod Harris is a 65 year old patient admitted with symptoms of abdominal distention, poor oral intake and fall at home. His medical history is significant for diabetes mellitus, alcohol use disorder, depression, and  "intracranial hemorrhage. During this hospitalization he has been seen by Gastroenterology regarding elevated LFT's and ascites consistent with acute alcohol hepatitis which he was placed on steroids (based on MELD, DF) and underwent diagnostic therapeutic paracentesis 1/4 2021 of 5.4 liters fluid (subsequent paracentesis 3900 mL this morning). Chemical Dependency consulted, but as the patient has been confused and lethargic he has not been able to participate. Nephrology was consulted for severe acute kidney injury with renal ultrasound 1/11/2021 demonstrating normal kidneys.          Findings & plan of care discussed with: Hospitalist Hakan Coughlin MD; bedside nurse Shell Newman RN and Registered Dietitian Abida Kaiser RD, LD.   Follow-up plan from palliative team: Will follow along during this hospitalization.   Thank you for involving us in the patient's care.     Ivory Jack MS, RN, CNS, APRN, ACHPN, FAACVPR  Pain and Palliative Care  Pager [phone]  Office [phone]     Time Spent on this Encounter   Total unit/floor time 11:10 AM until 12:50 PM, time consisted of the following, examination of the patient, reviewing the record and completing documentation. >50% of time spent in counseling and coordination of care.  Time spend counseling with patient and family consisted of the following topics, goals of care, education about prognosis and symptom management.  Time spent in coordination of care with Hospitalist Hakan Coughlin MD; bedside nurse Shell Newman RN and Registered Dietitian Abida Kaiser RD, LD.      Primary Care Physician   Pierce Govea    Chief Complaint   Altered Mental Status    History is obtained from the patient, electronic health record; patient's significant other and son    Decision-Making & Goals of Care:  Discussed on January 11, 2021 with Ivory WILEY, CNS:     Met with patient as he was resting in bed. He repeatedly stated \"I will quit " "drinking\", but could not give insight into his hospitalization. While I was at bedside, his significant other Heather Dennis called his cell phone. I assisted Pramod with answering his phone. I explained to Heather my role in palliative care. Heather acknowledged \"The doctor already asked him about his code status.\" Heather explained that Pramod's oldest son, Fito would make decisions and acknowledged that he does not have an advanced directive. I explained the succession of next-of-kin would include all of Pramod's biological and adoptive children. She acknowledged that Satish is not Pramod's biological son and agreed that I should call his son Fito regarding decision making. In explaining about making medical decisions, Pramod offered \"My brother committed suicide\", but he was not able to give further insight.     I phoned the patient's son Fito at [phone]. He acknowledged that Stuart and Maxine are also Pramod's biological children. He indicated that Stuart had been contacted about Pramod's hospitalization, but he was not certain if Maxine was aware as they are both somewhat estranged from him. Fito confirmed that Pramod's brother completed suicide about a year ago and it weighed heavily on Pramod's mind.        Past Medical History   I have reviewed this patient's medical history and updated it with pertinent information if needed.   History reviewed. No pertinent past medical history.    Past Surgical History   I have reviewed this patient's surgical history and updated it with pertinent information if needed.  Past Surgical History:   Procedure Laterality Date     COLONOSCOPY  11/06/2017    Dr. Barton [facility]     ENT SURGERY      T&A as a child     ESOPHAGOSCOPY, GASTROSCOPY, DUODENOSCOPY (EGD), COMBINED  06/2017    [facility]       Prior to Admission Medications   Prior to Admission Medications   Prescriptions Last Dose Informant Patient Reported? Taking?   escitalopram (LEXAPRO) 10 MG " tablet A week ago  Yes Yes   Sig: Take 10-20 mg by mouth daily    metFORMIN (GLUCOPHAGE) 500 MG tablet A week ago  Yes Yes   Sig: Take 500 mg by mouth 2 times daily (with meals)   omeprazole (PRILOSEC) 20 MG DR capsule A week ago  Yes Yes   Sig: Take 20 mg by mouth 2 times daily      Facility-Administered Medications: None     Allergies   No Known Allergies    Social History   Living situation: Independent prior to admission  Family system: Significant other Heather and his eldest son Fito are supportive  Functional status: Needs help with ADLs   Activities/interests: Enjoys the company of his dog and bearded dragon.   History of substance use/abuse:  reports that he has never smoked. He has never used smokeless tobacco.  reports current alcohol use.  Mandaen affiliation: Buddhism    Family History   I have reviewed this patient's family history and updated it with pertinent information if needed.   Family History   Problem Relation Age of Onset     Colon Cancer No family hx of        Review of Systems   The 10 point Review of Systems is negative other than noted in the HPI or here.     Palliative Symptom Review (0=no symptom/no concern, 1=mild, 2=moderate, 3=severe):      Pain: 0-none      Fatigue: 3-severe      Nausea: 0-none      Constipation: 0-none      Diarrhea: 0-none      Depressive Symptoms: 0-none      Anxiety: 0-none      Drowsiness: 0-none      Poor Appetite: 3-severe      Shortness of Breath: 0-none      Insomnia: 0-none      Physical Exam   Temp:  [96  F (35.6  C)-98.5  F (36.9  C)] 98.5  F (36.9  C)  Pulse:  [80-90] 80  Resp:  [16-18] 16  BP: (119-144)/(65-80) 124/65  SpO2:  [97 %-100 %] 100 %  188 lbs 0 oz  GEN:  Jaundiced, obese, chronically ill appearing  male. Alert, oriented to self and hospital, appears comfortable, no apparent distress.  HEENT:  Normocephalic/atraumatic, scleral icterus, no nasal discharge, mouth moist.  CV:  RRR, S1, S2; no murmurs or other irregularities  noted.  +3 DP/PT pulses bilaterally; bilateral leg edema.  RESP:  Clear to auscultation bilaterally without rales/rhonchi/wheezing/retractions.  Symmetric chest rise on inhalation noted.  Normal respiratory effort.  ABD:  Rounded, soft, non-tender/non-distended.  +BS  EXT:   CMS intact x 4.     M/S:   No grimace, wincing or complaint with palpation of extremities.    SKIN:  Jaundiced with multiple spider angiomata, warm and dry to touch.    NEURO: Symmetric strength +5/5.  Sensation to touch intact all extremities.   There is no area of allodynia or hyperesthesia.  Psych:  Flat affect, calm, slow to respond and having difficulty following conversation.        Data   Results for orders placed or performed during the hospital encounter of 01/03/21   CT Head w/o Contrast     Status: None    Narrative    CT SCAN OF THE HEAD WITHOUT CONTRAST   1/3/2021 4:40 PM     HISTORY: Confusion, trauma.    TECHNIQUE:  Axial images of the head and coronal reformations without  IV contrast material. Radiation dose for this scan was reduced using  automated exposure control, adjustment of the mA and/or kV according  to patient size, or iterative reconstruction technique.    COMPARISON: Head CT 8/16/2019    FINDINGS:  Thin left frontal subdural fluid collection is present  measuring up to approximately 6 mm in thickness, previously 1.4 cm.  The collection demonstrates peripheral hyperattenuation and central  hypoattenuation. No significant mass effect.    Background of mild volume loss is present with white matter  hypoattenuation which likely represents mild-to-moderate chronic  small-vessel ischemic change. Small old right superior temporal gyrus  infarct. Parenchyma is otherwise unremarkable.    The visualized calvarium, tympanic cavities, mastoid cavities, and  paranasal sinuses are unremarkable.      Impression    IMPRESSION:   1. Decreased size of left convexity subdural fluid collection,  probably chronic.  2. Otherwise, no acute  intracranial abnormality.    PAOLA NARAYAN MD   US Abdomen Limited     Status: None    Narrative    EXAM: US ABDOMEN LIMITED  LOCATION: [facility]  DATE/TIME: 1/3/2021 6:47 PM    INDICATION: Jaundice.  COMPARISON: None.  TECHNIQUE: Limited abdominal ultrasound.    FINDINGS:    GALLBLADDER: Large amount of polypoid solid debris within the gallbladder lumen presumably sludge. No shadowing. No gallbladder wall thickening.    BILE DUCTS: No biliary dilatation. The common duct measures 5 mm.    LIVER: Cirrhotic appearance to the liver.    RIGHT KIDNEY: No hydronephrosis.    PANCREAS: The visualized portions are normal.    Large volume ascites.      Impression    IMPRESSION:  1.  Cirrhosis with large volume ascites.    2.  Polypoid solid material within the gallbladder lumen presumably sludge. No gallbladder wall thickening or bile duct dilatation.   US Paracentesis     Status: None    Narrative    US PARACENTESIS 1/4/2021 10:37 AM    CLINICAL HISTORY: HIGH VOLUME paracentesis with or without diagnostic  fluid analysis with labs to be drawn if ordered.    PROCEDURE: Informed consent obtained. Time out performed. The abdomen  was prepped and draped in a sterile fashion. 10 mL of 1% lidocaine was  infused into local soft tissues. A 5 Swedish catheter system was  introduced into the abdominal ascites under ultrasound guidance.    5.4 liters of straw-colored fluid were removed and sent to lab if  requested.    Patient tolerated procedure well.    Ultrasound imaging was obtained and placed in the patient's permanent  medical record.      Impression    IMPRESSION:  1.  Status post ultrasound-guided paracentesis.    JACKIE NORRIS MD   US Renal Complete     Status: None    Narrative    ULTRASOUND RENAL COMPLETE 1/11/2021 9:37 AM    CLINICAL HISTORY: Acute kidney injury.    TECHNIQUE: Routine Bilateral Renal and Bladder Ultrasound.    COMPARISON: None.    FINDINGS:    RIGHT KIDNEY: 10.8 x 5.4 x 5.6 cm with a cortex  measuring 1.5 cm.  Normal without hydronephrosis or masses.     LEFT KIDNEY: 11.9 x 5.6 x 5.9 cm with a cortex measuring 2.9 cm.  Normal without hydronephrosis or masses.     BLADDER: Not visualized due to decompression.  A small amount of ascites is present.      Impression    IMPRESSION:  1.  Normal kidney ultrasound.    ELIEL GIRON MD   Creatinine POCT     Status: Abnormal   Result Value Ref Range    Creatinine 1.5 (H) 0.66 - 1.25 mg/dL    GFR Estimate 47 (L) >60 mL/min/[1.73_m2]    GFR Estimate If Black 57 (L) >60 mL/min/[1.73_m2]   Alcohol level blood     Status: None   Result Value Ref Range    Ethanol g/dL <0.01 <0.01 g/dL   CBC with platelets differential     Status: Abnormal   Result Value Ref Range    WBC 11.3 (H) 4.0 - 11.0 10e9/L    RBC Count 2.80 (L) 4.4 - 5.9 10e12/L    Hemoglobin 11.0 (L) 13.3 - 17.7 g/dL    Hematocrit 29.7 (L) 40.0 - 53.0 %     (H) 78 - 100 fl    MCH 39.3 (H) 26.5 - 33.0 pg    MCHC 37.0 (H) 31.5 - 36.5 g/dL    RDW 14.9 10.0 - 15.0 %    Platelet Count 181 150 - 450 10e9/L    Diff Method Automated Method     % Neutrophils 82.7 %    % Lymphocytes 8.3 %    % Monocytes 7.2 %    % Eosinophils 0.4 %    % Basophils 0.4 %    % Immature Granulocytes 1.0 %    Nucleated RBCs 0 0 /100    Absolute Neutrophil 9.4 (H) 1.6 - 8.3 10e9/L    Absolute Lymphocytes 0.9 0.8 - 5.3 10e9/L    Absolute Monocytes 0.8 0.0 - 1.3 10e9/L    Absolute Eosinophils 0.1 0.0 - 0.7 10e9/L    Absolute Basophils 0.0 0.0 - 0.2 10e9/L    Abs Immature Granulocytes 0.1 0 - 0.4 10e9/L    Absolute Nucleated RBC 0.0    INR     Status: Abnormal   Result Value Ref Range    INR 2.61 (H) 0.86 - 1.14   Comprehensive metabolic panel     Status: Abnormal   Result Value Ref Range    Sodium 121 (L) 133 - 144 mmol/L    Potassium 3.8 3.4 - 5.3 mmol/L    Chloride 93 (L) 94 - 109 mmol/L    Carbon Dioxide 22 20 - 32 mmol/L    Anion Gap 6 3 - 14 mmol/L    Glucose 110 (H) 70 - 99 mg/dL    Urea Nitrogen 28 7 - 30 mg/dL    Creatinine  1.34 (H) 0.66 - 1.25 mg/dL    GFR Estimate 55 (L) >60 mL/min/[1.73_m2]    GFR Estimate If Black 64 >60 mL/min/[1.73_m2]    Calcium 7.9 (L) 8.5 - 10.1 mg/dL    Bilirubin Total 21.8 (HH) 0.2 - 1.3 mg/dL    Albumin 1.5 (L) 3.4 - 5.0 g/dL    Protein Total 7.5 6.8 - 8.8 g/dL    Alkaline Phosphatase 180 (H) 40 - 150 U/L    ALT 42 0 - 70 U/L    AST Canceled, Test credited 0 - 45 U/L   Ammonia     Status: None   Result Value Ref Range    Ammonia 25 10 - 50 umol/L   Lipase     Status: Abnormal   Result Value Ref Range    Lipase 706 (H) 73 - 393 U/L   UA with Microscopic reflex to Culture     Status: Abnormal    Specimen: Midstream Urine   Result Value Ref Range    Color Urine Dark Yellow     Appearance Urine Slightly Cloudy     Glucose Urine Negative NEG^Negative mg/dL    Bilirubin Urine Large (A) NEG^Negative    Ketones Urine Trace (A) NEG^Negative mg/dL    Specific Gravity Urine 1.018 1.003 - 1.035    Blood Urine Negative NEG^Negative    pH Urine 6.0 5.0 - 7.0 pH    Protein Albumin Urine 10 (A) NEG^Negative mg/dL    Urobilinogen mg/dL 3.0 (H) 0.0 - 2.0 mg/dL    Nitrite Urine Negative NEG^Negative    Leukocyte Esterase Urine Negative NEG^Negative    Source Midstream Urine     WBC Urine 4 0 - 5 /HPF    RBC Urine <1 0 - 2 /HPF    Squamous Epithelial /HPF Urine 1 0 - 1 /HPF    Mucous Urine Present (A) NEG^Negative /LPF    Granular Casts 8 (A) NEG^Negative /LPF    Cellular Cast 2 (A) NEG^Negative /LPF   Asymptomatic SARS-CoV-2 COVID-19 Virus (Coronavirus) by PCR     Status: None    Specimen: Nasopharyngeal   Result Value Ref Range    SARS-CoV-2 Virus Specimen Source Nasopharyngeal     SARS-CoV-2 PCR Result NEGATIVE     SARS-CoV-2 PCR Comment (Note)    Glucose by meter     Status: Abnormal   Result Value Ref Range    Glucose 122 (H) 70 - 99 mg/dL   Bilirubin direct     Status: Abnormal   Result Value Ref Range    Bilirubin Direct 14.4 (H) 0.0 - 0.2 mg/dL   GGT     Status: Abnormal   Result Value Ref Range     (H) 0 - 75  U/L   Sodium random urine     Status: None   Result Value Ref Range    Sodium Urine mmol/L <5 mmol/L   Magnesium     Status: Abnormal   Result Value Ref Range    Magnesium 2.4 (H) 1.6 - 2.3 mg/dL   Phosphorus     Status: None   Result Value Ref Range    Phosphorus 3.2 2.5 - 4.5 mg/dL   Creatinine random urine     Status: None   Result Value Ref Range    Creatinine Urine Random 173 mg/dL   Cytology non gyn     Status: None   Result Value Ref Range    Copath Report       Patient Name: MYKE MONROE  MR#: [id number]  Specimen #:   Collected: 1/4/2021  Received: 1/4/2021  Reported: 1/5/2021 12:10  Ordering Phy(s): THOMAS GOMEZ  Additional Phy(s): REJI GOMEZ    For improved result formatting, select 'View Enhanced Report Format' under   Linked Documents section.    SPECIMEN/STAIN PROCESS:  Peritoneal fluid       Pap-Cyto x 1, Mills's stain-cyto x 1, Cell Block w/ H&E-Cyto x 1,   Save Ribbon, 1 x 1, Cell Block,  Level 2 x 1, Save Ribbon, 2 x 1, Cell Block, Level 3 x 1    ----------------------------------------------------------------    CYTOLOGIC INTERPRETATION:     Peritoneal fluid:   Negative for malignancy  Specimen Adequacy: Satisfactory for evaluation.    I have personally reviewed all specimens and/or slides, including the   listed special stains, and used them  with my medical judgement to determine or confirm the final diagnosis.    Electronically signed out by:    Elisabeth Dior M.D.    CLINI OVIDIO HISTORY:  Ascites.    ,    GROSS:  Peritoneal fluid:  Received 50 ml of yellow, cloudy fluid, processed as 1   Pap stained Autocyte,  1 Mills  stained cytospin and one hematoxylin and eosin stained cell block.    MICROSCOPIC:  he specimen is cellular and consists of benign mesothelial cells, and   [scant] mixed inflammatory cells.  Negative for malignancy    CPT Codes:  A: 96243-AGNIDVC, 83838-NUXPXIQ, 18793-WVN, HCB    COLLECTION SITE:  Client:  [facility]  Hospital  Location:  [facility] ()    The technical component of this testing was completed at the [facility], with the professional component performed   at the [facility], [address] [place] [address] ([phone])       Sodium     Status: Abnormal   Result Value Ref Range    Sodium 122 (L) 133 - 144 mmol/L   Sodium     Status: Abnormal   Result Value Ref Range    Sodium 122 (L) 133 - 144 mmol/L   Basic metabolic panel     Status: Abnormal   Result Value Ref Range    Sodium 124 (L) 133 - 144 mmol/L    Potassium 3.8 3.4 - 5.3 mmol/L    Chloride 94 94 - 109 mmol/L    Carbon Dioxide 21 20 - 32 mmol/L    Anion Gap 9 3 - 14 mmol/L    Glucose 90 70 - 99 mg/dL    Urea Nitrogen 32 (H) 7 - 30 mg/dL    Creatinine 1.41 (H) 0.66 - 1.25 mg/dL    GFR Estimate 52 (L) >60 mL/min/[1.73_m2]    GFR Estimate If Black 60 (L) >60 mL/min/[1.73_m2]    Calcium 8.1 (L) 8.5 - 10.1 mg/dL   CBC with platelets     Status: Abnormal   Result Value Ref Range    WBC 12.0 (H) 4.0 - 11.0 10e9/L    RBC Count 2.77 (L) 4.4 - 5.9 10e12/L    Hemoglobin 10.8 (L) 13.3 - 17.7 g/dL    Hematocrit 28.9 (L) 40.0 - 53.0 %     (H) 78 - 100 fl    MCH 39.0 (H) 26.5 - 33.0 pg    MCHC 37.4 (H) 31.5 - 36.5 g/dL    RDW 14.5 10.0 - 15.0 %    Platelet Count 173 150 - 450 10e9/L   Hepatic panel     Status: Abnormal   Result Value Ref Range    Bilirubin Direct 14.8 (H) 0.0 - 0.2 mg/dL    Bilirubin Total 21.5 (HH) 0.2 - 1.3 mg/dL    Albumin 1.4 (L) 3.4 - 5.0 g/dL    Protein Total 7.1 6.8 - 8.8 g/dL    Alkaline Phosphatase 170 (H) 40 - 150 U/L    ALT 43 0 - 70 U/L     (H) 0 - 45 U/L   Hepatits C antibody     Status: None   Result Value Ref Range    Hepatitis C Antibody Nonreactive NR^Nonreactive   Hepatitis B surface antigen     Status: None   Result Value Ref Range    Hep B Surface Agn Nonreactive NR^Nonreactive   Hepatitis A antibody IgM     Status: None    Result Value Ref Range    Hepatitis A IgM Sara Nonreactive NR^Nonreactive   Hepatitis B core antibody     Status: None   Result Value Ref Range    Hepatitis B Core Sara Nonreactive NR^Nonreactive   Glucose by meter     Status: Abnormal   Result Value Ref Range    Glucose 106 (H) 70 - 99 mg/dL   Glucose by meter     Status: Abnormal   Result Value Ref Range    Glucose 123 (H) 70 - 99 mg/dL   Comprehensive metabolic panel     Status: Abnormal   Result Value Ref Range    Sodium 124 (L) 133 - 144 mmol/L    Potassium 4.1 3.4 - 5.3 mmol/L    Chloride 95 94 - 109 mmol/L    Carbon Dioxide 22 20 - 32 mmol/L    Anion Gap 7 3 - 14 mmol/L    Glucose 121 (H) 70 - 99 mg/dL    Urea Nitrogen 38 (H) 7 - 30 mg/dL    Creatinine 1.62 (H) 0.66 - 1.25 mg/dL    GFR Estimate 44 (L) >60 mL/min/[1.73_m2]    GFR Estimate If Black 51 (L) >60 mL/min/[1.73_m2]    Calcium 8.1 (L) 8.5 - 10.1 mg/dL    Bilirubin Total 19.1 (HH) 0.2 - 1.3 mg/dL    Albumin 1.2 (L) 3.4 - 5.0 g/dL    Protein Total 6.5 (L) 6.8 - 8.8 g/dL    Alkaline Phosphatase 161 (H) 40 - 150 U/L    ALT 38 0 - 70 U/L     (H) 0 - 45 U/L   INR     Status: Abnormal   Result Value Ref Range    INR 2.97 (H) 0.86 - 1.14   CBC with platelets differential     Status: Abnormal   Result Value Ref Range    WBC 12.6 (H) 4.0 - 11.0 10e9/L    RBC Count 2.55 (L) 4.4 - 5.9 10e12/L    Hemoglobin 9.8 (L) 13.3 - 17.7 g/dL    Hematocrit 26.4 (L) 40.0 - 53.0 %     (H) 78 - 100 fl    MCH 38.4 (H) 26.5 - 33.0 pg    MCHC 37.1 (H) 31.5 - 36.5 g/dL    RDW 14.4 10.0 - 15.0 %    Platelet Count 166 150 - 450 10e9/L    Diff Method Automated Method     % Neutrophils 84.0 %    % Lymphocytes 8.6 %    % Monocytes 6.3 %    % Eosinophils 0.0 %    % Basophils 0.2 %    % Immature Granulocytes 0.9 %    Nucleated RBCs 0 0 /100    Absolute Neutrophil 10.6 (H) 1.6 - 8.3 10e9/L    Absolute Lymphocytes 1.1 0.8 - 5.3 10e9/L    Absolute Monocytes 0.8 0.0 - 1.3 10e9/L    Absolute Eosinophils 0.0 0.0 - 0.7 10e9/L     Absolute Basophils 0.0 0.0 - 0.2 10e9/L    Abs Immature Granulocytes 0.1 0 - 0.4 10e9/L    Absolute Nucleated RBC 0.0    CBC with platelets differential     Status: Abnormal   Result Value Ref Range    WBC 14.4 (H) 4.0 - 11.0 10e9/L    RBC Count 2.94 (L) 4.4 - 5.9 10e12/L    Hemoglobin 11.3 (L) 13.3 - 17.7 g/dL    Hematocrit 30.4 (L) 40.0 - 53.0 %     (H) 78 - 100 fl    MCH 38.4 (H) 26.5 - 33.0 pg    MCHC 37.2 (H) 31.5 - 36.5 g/dL    RDW 14.1 10.0 - 15.0 %    Platelet Count 191 150 - 450 10e9/L    Diff Method Automated Method     % Neutrophils 83.9 %    % Lymphocytes 6.7 %    % Monocytes 8.1 %    % Eosinophils 0.0 %    % Basophils 0.1 %    % Immature Granulocytes 1.2 %    Nucleated RBCs 0 0 /100    Absolute Neutrophil 12.1 (H) 1.6 - 8.3 10e9/L    Absolute Lymphocytes 1.0 0.8 - 5.3 10e9/L    Absolute Monocytes 1.2 0.0 - 1.3 10e9/L    Absolute Eosinophils 0.0 0.0 - 0.7 10e9/L    Absolute Basophils 0.0 0.0 - 0.2 10e9/L    Abs Immature Granulocytes 0.2 0 - 0.4 10e9/L    Absolute Nucleated RBC 0.0    Comprehensive metabolic panel     Status: Abnormal   Result Value Ref Range    Sodium 123 (L) 133 - 144 mmol/L    Potassium 4.4 3.4 - 5.3 mmol/L    Chloride 94 94 - 109 mmol/L    Carbon Dioxide 22 20 - 32 mmol/L    Anion Gap 7 3 - 14 mmol/L    Glucose 115 (H) 70 - 99 mg/dL    Urea Nitrogen 42 (H) 7 - 30 mg/dL    Creatinine 1.61 (H) 0.66 - 1.25 mg/dL    GFR Estimate 44 (L) >60 mL/min/[1.73_m2]    GFR Estimate If Black 51 (L) >60 mL/min/[1.73_m2]    Calcium 8.4 (L) 8.5 - 10.1 mg/dL    Bilirubin Total 20.7 (HH) 0.2 - 1.3 mg/dL    Albumin 1.4 (L) 3.4 - 5.0 g/dL    Protein Total 7.3 6.8 - 8.8 g/dL    Alkaline Phosphatase 183 (H) 40 - 150 U/L    ALT 48 0 - 70 U/L     (H) 0 - 45 U/L   INR     Status: Abnormal   Result Value Ref Range    INR 2.76 (H) 0.86 - 1.14   Ammonia     Status: Abnormal   Result Value Ref Range    Ammonia 82 (H) 10 - 50 umol/L   CBC with platelets differential     Status: Abnormal   Result Value  Ref Range    WBC 15.7 (H) 4.0 - 11.0 10e9/L    RBC Count 2.71 (L) 4.4 - 5.9 10e12/L    Hemoglobin 10.4 (L) 13.3 - 17.7 g/dL    Hematocrit 28.5 (L) 40.0 - 53.0 %     (H) 78 - 100 fl    MCH 38.4 (H) 26.5 - 33.0 pg    MCHC 36.5 31.5 - 36.5 g/dL    RDW 14.6 10.0 - 15.0 %    Platelet Count 206 150 - 450 10e9/L    Diff Method Automated Method     % Neutrophils 83.0 %    % Lymphocytes 6.9 %    % Monocytes 8.9 %    % Eosinophils 0.0 %    % Basophils 0.1 %    % Immature Granulocytes 1.1 %    Nucleated RBCs 0 0 /100    Absolute Neutrophil 13.0 (H) 1.6 - 8.3 10e9/L    Absolute Lymphocytes 1.1 0.8 - 5.3 10e9/L    Absolute Monocytes 1.4 (H) 0.0 - 1.3 10e9/L    Absolute Eosinophils 0.0 0.0 - 0.7 10e9/L    Absolute Basophils 0.0 0.0 - 0.2 10e9/L    Abs Immature Granulocytes 0.2 0 - 0.4 10e9/L    Absolute Nucleated RBC 0.0    Comprehensive metabolic panel     Status: Abnormal   Result Value Ref Range    Sodium 125 (L) 133 - 144 mmol/L    Potassium 3.9 3.4 - 5.3 mmol/L    Chloride 96 94 - 109 mmol/L    Carbon Dioxide 23 20 - 32 mmol/L    Anion Gap 6 3 - 14 mmol/L    Glucose 110 (H) 70 - 99 mg/dL    Urea Nitrogen 48 (H) 7 - 30 mg/dL    Creatinine 1.68 (H) 0.66 - 1.25 mg/dL    GFR Estimate 42 (L) >60 mL/min/[1.73_m2]    GFR Estimate If Black 49 (L) >60 mL/min/[1.73_m2]    Calcium 8.5 8.5 - 10.1 mg/dL    Bilirubin Total 19.6 (HH) 0.2 - 1.3 mg/dL    Albumin 1.4 (L) 3.4 - 5.0 g/dL    Protein Total 6.7 (L) 6.8 - 8.8 g/dL    Alkaline Phosphatase 174 (H) 40 - 150 U/L    ALT 56 0 - 70 U/L     (H) 0 - 45 U/L   Ammonia     Status: None   Result Value Ref Range    Ammonia 43 10 - 50 umol/L   CBC with platelets differential     Status: Abnormal   Result Value Ref Range    WBC 21.1 (H) 4.0 - 11.0 10e9/L    RBC Count 2.81 (L) 4.4 - 5.9 10e12/L    Hemoglobin 10.8 (L) 13.3 - 17.7 g/dL    Hematocrit 30.0 (L) 40.0 - 53.0 %     (H) 78 - 100 fl    MCH 38.4 (H) 26.5 - 33.0 pg    MCHC 36.0 31.5 - 36.5 g/dL    RDW 14.6 10.0 - 15.0 %     Platelet Count 197 150 - 450 10e9/L    Diff Method Automated Method     % Neutrophils 83.3 %    % Lymphocytes 6.1 %    % Monocytes 9.3 %    % Eosinophils 0.0 %    % Basophils 0.1 %    % Immature Granulocytes 1.2 %    Nucleated RBCs 0 0 /100    Absolute Neutrophil 17.6 (H) 1.6 - 8.3 10e9/L    Absolute Lymphocytes 1.3 0.8 - 5.3 10e9/L    Absolute Monocytes 2.0 (H) 0.0 - 1.3 10e9/L    Absolute Eosinophils 0.0 0.0 - 0.7 10e9/L    Absolute Basophils 0.0 0.0 - 0.2 10e9/L    Abs Immature Granulocytes 0.3 0 - 0.4 10e9/L    Absolute Nucleated RBC 0.0    Comprehensive metabolic panel     Status: Abnormal   Result Value Ref Range    Sodium 125 (L) 133 - 144 mmol/L    Potassium 3.7 3.4 - 5.3 mmol/L    Chloride 97 94 - 109 mmol/L    Carbon Dioxide 21 20 - 32 mmol/L    Anion Gap 7 3 - 14 mmol/L    Glucose 107 (H) 70 - 99 mg/dL    Urea Nitrogen 51 (H) 7 - 30 mg/dL    Creatinine 1.76 (H) 0.66 - 1.25 mg/dL    GFR Estimate 40 (L) >60 mL/min/[1.73_m2]    GFR Estimate If Black 46 (L) >60 mL/min/[1.73_m2]    Calcium 8.8 8.5 - 10.1 mg/dL    Bilirubin Total 21.2 (HH) 0.2 - 1.3 mg/dL    Albumin 1.6 (L) 3.4 - 5.0 g/dL    Protein Total 7.0 6.8 - 8.8 g/dL    Alkaline Phosphatase 168 (H) 40 - 150 U/L    ALT 71 (H) 0 - 70 U/L     (H) 0 - 45 U/L   Lactic acid level STAT     Status: Abnormal   Result Value Ref Range    Lactate for Sepsis Protocol 3.7 (H) 0.7 - 2.0 mmol/L   Lactic acid whole blood     Status: Abnormal   Result Value Ref Range    Lactic Acid 2.1 (H) 0.7 - 2.0 mmol/L   Phosphorus     Status: None   Result Value Ref Range    Phosphorus 3.5 2.5 - 4.5 mg/dL   Hemoglobin     Status: Abnormal   Result Value Ref Range    Hemoglobin 10.5 (L) 13.3 - 17.7 g/dL   CBC with platelets     Status: Abnormal   Result Value Ref Range    WBC 24.5 (H) 4.0 - 11.0 10e9/L    RBC Count 2.62 (L) 4.4 - 5.9 10e12/L    Hemoglobin 10.1 (L) 13.3 - 17.7 g/dL    Hematocrit 28.5 (L) 40.0 - 53.0 %     (H) 78 - 100 fl    MCH 38.5 (H) 26.5 - 33.0 pg     MCHC 35.4 31.5 - 36.5 g/dL    RDW 15.0 10.0 - 15.0 %    Platelet Count 170 150 - 450 10e9/L   Comprehensive metabolic panel     Status: Abnormal   Result Value Ref Range    Sodium 122 (L) 133 - 144 mmol/L    Potassium 4.0 3.4 - 5.3 mmol/L    Chloride 96 94 - 109 mmol/L    Carbon Dioxide 19 (L) 20 - 32 mmol/L    Anion Gap 7 3 - 14 mmol/L    Glucose 109 (H) 70 - 99 mg/dL    Urea Nitrogen 64 (H) 7 - 30 mg/dL    Creatinine 2.71 (H) 0.66 - 1.25 mg/dL    GFR Estimate 24 (L) >60 mL/min/[1.73_m2]    GFR Estimate If Black 27 (L) >60 mL/min/[1.73_m2]    Calcium 8.2 (L) 8.5 - 10.1 mg/dL    Bilirubin Total 20.7 (HH) 0.2 - 1.3 mg/dL    Albumin 1.5 (L) 3.4 - 5.0 g/dL    Protein Total 6.5 (L) 6.8 - 8.8 g/dL    Alkaline Phosphatase 151 (H) 40 - 150 U/L    ALT 80 (H) 0 - 70 U/L     (H) 0 - 45 U/L   Lactic acid level STAT     Status: Abnormal   Result Value Ref Range    Lactate for Sepsis Protocol 3.9 (H) 0.7 - 2.0 mmol/L   Lactic acid whole blood     Status: Abnormal   Result Value Ref Range    Lactic Acid 4.2 (HH) 0.7 - 2.0 mmol/L   UA reflex to Microscopic     Status: Abnormal   Result Value Ref Range    Color Urine Dark Yellow     Appearance Urine Slightly Cloudy     Glucose Urine Negative NEG^Negative mg/dL    Bilirubin Urine Moderate (A) NEG^Negative    Ketones Urine Negative NEG^Negative mg/dL    Specific Gravity Urine 1.018 1.003 - 1.035    Blood Urine Negative NEG^Negative    pH Urine 5.0 5.0 - 7.0 pH    Protein Albumin Urine 10 (A) NEG^Negative mg/dL    Urobilinogen mg/dL 2.0 0.0 - 2.0 mg/dL    Nitrite Urine Negative NEG^Negative    Leukocyte Esterase Urine Negative NEG^Negative    Source Catheterized Urine     RBC Urine <1 0 - 2 /HPF    WBC Urine 1 0 - 5 /HPF    Squamous Epithelial /HPF Urine 1 0 - 1 /HPF    Mucous Urine Present (A) NEG^Negative /LPF    Hyaline Casts 8 (H) 0 - 2 /LPF    Amorphous Crystals Many (A) NEG^Negative /HPF   Fractional excretion of sodium     Status: None   Result Value Ref Range     Creatinine Urine 156 mg/dL    Sodium Urine mmol/L <5 mmol/L    %FENA <0.1 %   Protein  random urine with Creat Ratio     Status: Abnormal   Result Value Ref Range    Protein Random Urine 0.48 g/L    Protein Total Urine g/gr Creatinine 0.31 (H) 0 - 0.2 g/g Cr   Lactic acid whole blood     Status: Abnormal   Result Value Ref Range    Lactic Acid 3.4 (H) 0.7 - 2.0 mmol/L   Lactic acid whole blood     Status: Abnormal   Result Value Ref Range    Lactic Acid 2.3 (H) 0.7 - 2.0 mmol/L   INR     Status: Abnormal   Result Value Ref Range    INR 4.01 (H) 0.86 - 1.14   CBC with platelets     Status: Abnormal   Result Value Ref Range    WBC 18.8 (H) 4.0 - 11.0 10e9/L    RBC Count 2.28 (L) 4.4 - 5.9 10e12/L    Hemoglobin 8.8 (L) 13.3 - 17.7 g/dL    Hematocrit 24.6 (L) 40.0 - 53.0 %     (H) 78 - 100 fl    MCH 38.6 (H) 26.5 - 33.0 pg    MCHC 35.8 31.5 - 36.5 g/dL    RDW 14.9 10.0 - 15.0 %    Platelet Count 114 (L) 150 - 450 10e9/L   Comprehensive metabolic panel     Status: Abnormal   Result Value Ref Range    Sodium 126 (L) 133 - 144 mmol/L    Potassium 3.8 3.4 - 5.3 mmol/L    Chloride 95 94 - 109 mmol/L    Carbon Dioxide 23 20 - 32 mmol/L    Anion Gap 8 3 - 14 mmol/L    Glucose 126 (H) 70 - 99 mg/dL    Urea Nitrogen 77 (H) 7 - 30 mg/dL    Creatinine 3.14 (H) 0.66 - 1.25 mg/dL    GFR Estimate 20 (L) >60 mL/min/[1.73_m2]    GFR Estimate If Black 23 (L) >60 mL/min/[1.73_m2]    Calcium 8.2 (L) 8.5 - 10.1 mg/dL    Bilirubin Total 20.0 (HH) 0.2 - 1.3 mg/dL    Albumin 2.5 (L) 3.4 - 5.0 g/dL    Protein Total 5.8 (L) 6.8 - 8.8 g/dL    Alkaline Phosphatase 109 40 - 150 U/L    ALT 60 0 - 70 U/L    AST 82 (H) 0 - 45 U/L   CBC with platelets     Status: Abnormal   Result Value Ref Range    WBC 21.7 (H) 4.0 - 11.0 10e9/L    RBC Count 2.41 (L) 4.4 - 5.9 10e12/L    Hemoglobin 9.2 (L) 13.3 - 17.7 g/dL    Hematocrit 26.5 (L) 40.0 - 53.0 %     (H) 78 - 100 fl    MCH 38.2 (H) 26.5 - 33.0 pg    MCHC 34.7 31.5 - 36.5 g/dL    RDW 14.6  10.0 - 15.0 %    Platelet Count 135 (L) 150 - 450 10e9/L   Comprehensive metabolic panel     Status: Abnormal   Result Value Ref Range    Sodium 124 (L) 133 - 144 mmol/L    Potassium 3.7 3.4 - 5.3 mmol/L    Chloride 95 94 - 109 mmol/L    Carbon Dioxide 20 20 - 32 mmol/L    Anion Gap 9 3 - 14 mmol/L    Glucose 169 (H) 70 - 99 mg/dL    Urea Nitrogen 90 (H) 7 - 30 mg/dL    Creatinine 2.83 (H) 0.66 - 1.25 mg/dL    GFR Estimate 22 (L) >60 mL/min/[1.73_m2]    GFR Estimate If Black 26 (L) >60 mL/min/[1.73_m2]    Calcium 8.5 8.5 - 10.1 mg/dL    Bilirubin Total 21.7 (HH) 0.2 - 1.3 mg/dL    Albumin 2.6 (L) 3.4 - 5.0 g/dL    Protein Total 6.2 (L) 6.8 - 8.8 g/dL    Alkaline Phosphatase 121 40 - 150 U/L    ALT 69 0 - 70 U/L     (H) 0 - 45 U/L   EKG 12-lead, tracing only     Status: None   Result Value Ref Range    Interpretation ECG Click View Image link to view waveform and result    Gastroenterology IP Consult: cirrhosis; Consultant may enter orders: Yes; Patient to be seen: Routine - within 24 hours; Requested Clinic/Group: [facility]; Requesting provider? Hospitalist (if different from attending physician)     Status: None ()    Narrative    Misty Tan PA-C     1/4/2021 10:31 AM  [facility]  [facility]  Gastroenterology Consultation    Pramod Harris  [id number] [id number]  65 year old male    Admission Date/Time: 1/3/2021  Primary Care Provider: Pierce Govea    We were asked to see the patient in consultation by Dr. Rahman for   evaluation of abdominal distention.      HPI:  Pramod Harris is a 65 year old male with PMH   including diabetes, alcohol use disorder, depression, and   intracranial hemorrhage who was admitted 1/3/21 after presenting   to the ED with abdominal distention. Patient developed multiple   symptoms over the past 1 week including decreased oral intake,   abdominal and lower extremity swelling, jaundice, and dark  urine.   He also apparently fell at home. History is limited from the   patient. He reports new abdominal swelling starting about 4 days   ago. He denies associated nausea, vomiting, abdominal pain, bowel   changes, melena, rectal bleeding.    Patient found to have multiple lab abnormalities including   hemoglobin 11.0, WBC 11.3, sodium 121, creatinine 1.34, calcium   7.9, , ALT 42, , total bilirubin 21.8 (direct   14.4), albumin 1.5, , lipase 706, INR 2.61. CT head   negative for intracranial bleed. RUQ US showed cirrhosis with   large volume ascites and polypoid material in the gallbladder   without cholecystitis or biliary ductal dilation. Viral hepatitis   labs ordered and pending. Paracentesis also pending.    Patient denies prior diagnosis of cirrhosis. Drinks alcohol   regularly; states one 6-pack per months. States last drink was 2   weeks ago. Blood alcohol undetectable on presentation. It appears   that patient has previously had elevated transaminases (AST >   ALT) dating back to 2017 and did have elevated blood alcohol   level in 2019.    ROS: A comprehensive ten point review of systems was negative   aside from those in mentioned in the HPI.      MEDICATIONS: No current facility-administered medications on file   prior to encounter.        escitalopram (LEXAPRO) 10 MG tablet, Take 10-20 mg by mouth   daily        metFORMIN (GLUCOPHAGE) 500 MG tablet, Take 500 mg by mouth 2   times daily (with meals)       omeprazole (PRILOSEC) 20 MG DR capsule, Take 20 mg by mouth 2   times daily        ALLERGIES: No Known Allergies    History reviewed. No pertinent past medical history.    Past Surgical History:   Procedure Laterality Date     [facility]  [facility]     ENT SURGERY      T&A as a child     ESOPHAGOSCOPY, GASTROSCOPY, DUODENOSCOPY (EGD), COMBINED    06/2017    [facility]       SOCIAL HISTORY:  Social History     Tobacco Use     Smoking status: Never Smoker      Smokeless tobacco: Never Used   Substance Use Topics     Alcohol use: Yes     Comment: 4-5 beers/ 2-3 times per week     Drug use: No       FAMILY HISTORY:  Family History   Problem Relation Age of Onset     Colon Cancer No family hx of        PHYSICAL EXAM:   /64   Pulse 82   Temp 95.9  F (35.5  C) (Axillary)     Resp 18   Wt 90.2 kg (198 lb 12.8 oz)   SpO2 99%   BMI 32.09   kg/m      Constitutional: No acute distress.  Head: Normocephalic, atraumatic.    Neck: Neck supple. No adenopathy.  Eyes: Scleral icterus.  ENT: Hearing adequate. Pharynx normal without erythema or   exudate.  Cardiovascular: RRR.  Respiratory: Nonlabored.  Abdomen: Round/ascites. Nontender.  Neuro: CN II-XII grossly intact. No focal deficits. No asterixis.  Extremities: Bilateral lower extremity edema.  Skin: Jaundice.  Psychiatric: Slightly confused. Mood and affect normal.      ADDITIONAL COMMENTS:   I reviewed the patient's new clinical lab test results.   Recent Labs   Lab Test 01/04/21  0638 01/03/21  1544 06/27/19  2305 06/27/19  2104   WBC 12.0* 11.3*  --  4.2   HGB 10.8* 11.0* 13.5 13.9   * 106*  --  99    181  --  173   INR  --  2.61*  --  1.15*     Recent Labs   Lab Test 01/04/21  0638 01/04/21  0154 01/03/21  2218 01/03/21  1544 06/27/19  2104   * 122* 122* 121* 133   POTASSIUM 3.8  --   --  3.8 3.9   CHLORIDE 94  --   --  93* 100   CO2 21  --   --  22 24   BUN 32*  --   --  28 3*   CR 1.41*  --   --  1.34* 0.62*   ANIONGAP 9  --   --  6 9   OVIDIO 8.1*  --   --  7.9* 8.1*   GLC 90  --   --  110* 130*     Recent Labs   Lab Test 01/04/21  0638 01/03/21  2030 01/03/21  1544 10/24/17  1507   ALBUMIN 1.4*  --  1.5* 3.4   BILITOTAL 21.5*  --  21.8* 0.3   DBIL 14.8*  --  14.4*  --    ALT 43  --  42 71*   *  --  Canceled, Test credited 240*   ALKPHOS 170*  --  180* 88   PROTEIN  --  10*  --   --    LIPASE  --   --  706*  --          IMAGING/ENDOSCOPY    CT Head w/o contrast 1/3/21   - Decreased size  of left convexity subdural fluid collection,   probably chronic.   - Otherwise, no acute intracranial abnormality.    RUQ US 1/3/21   - Cirrhosis with large volume ascites.   - Polypoid solid material within the gallbladder lumen   presumably sludge. No gallbladder wall thickening or bile duct   dilatation.      CONSULTATION ASSESSMENT AND PLAN:    Pramod Harris is a 65-year-old male who was admitted   1/3/21 after presenting with multiple symptoms including   abdominal distention, lower extremity edema, decreased oral   intake, jaundice, and dark urine. Found to have multiple lab   abnormalities (hyponatremia, hyperbilirubinemia, elevated   creatinine, elevated INR) and US showed cirrhosis with large   volume ascites.    1) Cirrhosis with ascites: Etiology unclear, but suspect alcohol.   Elevated LFTs most likely represent acute alcohol hepatitis on   chronic liver disease. MELD 32, MELD-Na 35, . Cirrhosis   complicated by ascites, coagulopathy, mild HE. Also with YVONNE, low   albumin. Paracentesis pending.        - Trend LFTs, INR, renal function.        - Follow up viral hepatitis panel.        - Diagnostic / therapeutic paracentesis today. Will follow   up on fluid studies.        - Hold diuretics for now given hyponatremia, YVONNE.   Eventually may add Lasix/Aldactone.        - Start prednisolone 40 mg daily for alcohol hepatitis.        - Low sodium, high protein diet.        - [facility]ish care with [facility] as [facility].    2) Alcohol use disorder: Most likely underlying cause of liver   disease. Patient not very forthcoming about alcohol use. Did have   elevated transaminases (AST>ALT) dating back to 2017 which   suggests alcohol. Blood alcohol undetectable on admission.   Unclear if mild confusion is from withdrawal, HE, and/or   metabolic.         - Thiamine/folate.         - Alcohol cessation.         - Monitor for s/sx withdrawal.    I discussed the patient plan with Dr. Stevenson. Thank you for    asking us to participate in the care of this patient.    Misty Tan PA-C  [facility] ([facility])      Chemical Dependency IP Consult     Status: None ()    Lianne Melendez, [facility]     1/7/2021 10:11 AM  1/7/21 chem dep consult acknowledged.  Spoke with RN, Belinda,   and she reported patient continues to be confused and lethargic.    We agreed patient would not be appropriate for interview at this   time.  I informed her, I would be closing consult at this time,   so once patient becomes A&O and appropriate a new consult order   can be placed.        Kaylan Wayne, [facility]  [phone]   Care Management / Social Work IP Consult     Status: None ()    Narrative    Dimas Lawrence, [facility]     1/8/2021  4:03 PM  Care Management Initial Consult    General Information  Assessment completed with: Pt's CANELO Romero (pt unable to converse   at this time)     Communication Assessment  Patient's communication style: spoken language (English or   Bilingual)    Hearing Difficulty or Deaf: yes   Wear Glasses or Blind: other (see comments)(reading classes)    Cognitive  Cognitive/Neuro/Behavioral: .WDL except  Level of Consciousness:   alert, confused  Arousal Level: opens eyes spontaneously    Orientation: disoriented to, time, situation  Mood/Behavior:   restless  Best Language: 0 - No aphasia  Speech: clear    Living Environment:   People in home:     S.SANDY Romero x 15 years.  Current living Arrangements:     House  Able to return to prior arrangements:  Not at this time.     Family/Social Support:  Care provided by:  Heather  Provides care for:  No One                Description of Support System:    Supportive and involved       Current Resources:   Skilled Home Care Services:  N/A  Community Resources:  N/A  Equipment currently used at home: walker, standard    Lifestyle & Psychosocial Needs:      SLISSETTE Romero. 3 sons and a sister in [place].  Tobacco Use     Smoking status: Never Smoker      Smokeless tobacco: Never Used   Substance and Sexual Activity     Alcohol use: Yes     Comment: 4-5 beers/ 2-3 times per week     Drug use: No             Discharge Date: TBD       Discharge Disposition: TCU    Discharge Services: Chemical Dependency Resources    Discharge DME: None    Discharge Transportation: family will provide    PAS Confirmation Code:    Patient/family educated on Medicare website which has current   facility and service quality ratings: Yes    Education Provided on the Discharge Plan:  SW spoke with CANELO Romero and discussed TCU upon discharge.    TCU referrals made to:  [facility]  [facility]     Please confirm choices with Heather if they have availability as   she is researching each.    Dimas Lawrence [facility] Case Management  Inpatient   Maternal Child and ED  [facility]    [phone]     Nephrology IP Consult: Patient to be seen: Routine - within 24 hours; worsening renal failure; Consultant may enter orders: Yes; Requesting provider? Attending physician     Status: None ()    Narrative    Sanjiv Trent MD     1/9/2021  1:20 PM  RENAL CONSULTATION NOTE    REFERRING MD:  Hakan Coughlin MD    REASON FOR CONSULTATION:  worsening renal failure    HPI:  65 y.o man with alcohol abuse, history of SDH, diabetes and   depression, who came in for increasing abdominal distention.   Patient is confused. He is unable to provide much information. I   reviewed his chart, labs and imaging studies in epic. I discussed   the case with the medical staff.     Per Dr. Rahman's admission H&P:   Evidently, the patient has been doing poorly for about 1 week.    He has had gradually less oral intake over the past 1 week.  His   abdomen and legs have been increasing in size.  His skin has   become more yellow and he is also becoming more icteric.  It is   now quite notable and apparent.   His urine has been dark now  for   a few weeks as well.  He was evidently seen by some sort of   provider about a week ago upon the urging of his significant   other who was concerned about possibly prostate issues.  The   patient left the clinic, according to her report, with an   albuterol inhaler.  She is not exactly sure what took place   during the meeting.  Last Monday, he was seen by his   ophthalmologist who noticed scleral icterus and recommended he   follow-up in clinic for this issue.  According to his significant   other, he did not follow-up.  He has continued to deteriorate   since that time and gradually failing at home.  He evidently had   a fall yesterday with very unclear details.  The patient does not   particularly remember this episode.  His significant other went   to see him today and was quite concerned about his condition was   brought him to the Emergency Department for evaluation.      Here in the Emergency Department, his vital signs are stable with   temperature 97.8, heart rate 81, blood pressure 124/71,   respiratory rate 16 and oxygen saturation 99% on room air.     ROS:  A complete review of systems was performed and is negative   except as noted above.    PMH:  History reviewed. No pertinent past medical history.    PSH:    Past Surgical History:   Procedure Laterality Date     COLONOSCOPY  11/06/2017    Dr. Mick SANTIAGO     ENT SURGERY      T&A as a child     ESOPHAGOSCOPY, GASTROSCOPY, DUODENOSCOPY (EGD), COMBINED    06/2017    [facility]       MEDICATIONS:      acetaminophen  500 mg Oral Once     albumin human  25 g Intravenous Q8H     folic acid  1 mg Oral Daily     lactulose  10 g Oral TID     multivitamin w/minerals  1 tablet Oral Daily     pantoprazole  40 mg Oral QAM AC     prednisoLONE  40 mg Oral Daily     rifaximin  550 mg Oral BID     thiamine  100 mg Oral TID    Followed by     [START ON 1/11/2021] thiamine  100 mg Oral Daily       ALLERGIES:    Allergies as of 01/03/2021     (No Known  Allergies)       FH:    Family History   Problem Relation Age of Onset     Colon Cancer No family hx of        SH:    Social History     Socioeconomic History     Marital status:      Spouse name: Not on file     Number of children: Not on file     Years of education: Not on file     Highest education level: Not on file   Occupational History     Not on file   Social Needs     Financial resource strain: Not on file     Food insecurity     Worry: Not on file     Inability: Not on file     Transportation needs     Medical: Not on file     Non-medical: Not on file   Tobacco Use     Smoking status: Never Smoker     Smokeless tobacco: Never Used   Substance and Sexual Activity     Alcohol use: Yes     Comment: 4-5 beers/ 2-3 times per week     Drug use: No     Sexual activity: Not on file   Lifestyle     Physical activity     Days per week: Not on file     Minutes per session: Not on file     Stress: Not on file   Relationships     Social connections     Talks on phone: Not on file     Gets together: Not on file     Attends Hoahaoism service: Not on file     Active member of club or organization: Not on file     Attends meetings of clubs or organizations: Not on file     Relationship status: Not on file     Intimate partner violence     Fear of current or ex partner: Not on file     Emotionally abused: Not on file     Physically abused: Not on file     Forced sexual activity: Not on file   Other Topics Concern     Parent/sibling w/ CABG, MI or angioplasty before 65F 55M? Not   Asked   Social History Narrative     Not on file       PHYSICAL EXAM:    /52 (BP Location: Right arm)   Pulse 83   Temp 96  F   (35.6  C) (Axillary)   Resp 18   Wt 85.3 kg (188 lb)   SpO2   100%   BMI 30.34 kg/m    GENERAL: Sitting in the chair. Looks sick.   HEENT:  Normocephalic. No gross abnormalities.  Pupils equal.    MMM.  Sclera icterus.   CV: RRR, no murmurs, no clicks, gallops, or rubs, no edema  RESP: Clear bilaterally  with good efforts. No wheezes or   crackles. Breathing is non-labor.   GI: Abdomen distended, soft, NT.   MUSCULOSKELETAL: extremities nl - no gross deformities noted. No   edema.   SKIN: no suspicious lesions or rashes, dry to touch. Jaundice.   Bruise R inner thigh.   NEURO:  Generalized weakness. Confused. Unable to provide   history.   PSYCH: Unable to evaluate.   LYMPH: No palpable ant/post cervical     LABS:      CBC RESULTS:     Recent Labs   Lab 01/09/21  0705 01/08/21  1246 01/08/21  0635 01/07/21  0655 01/06/21  0634 01/05/21  0807 01/04/21  0638   WBC 24.5*  --  21.1* 15.7* 14.4* 12.6* 12.0*   RBC 2.62*  --  2.81* 2.71* 2.94* 2.55* 2.77*   HGB 10.1* 10.5* 10.8* 10.4* 11.3* 9.8* 10.8*   HCT 28.5*  --  30.0* 28.5* 30.4* 26.4* 28.9*     --  197 206 191 166 173       BMP RESULTS:  Recent Labs   Lab 01/09/21  0705 01/08/21  0635 01/07/21  0655 01/06/21  0634 01/05/21  0807 01/04/21  0638   * 125* 125* 123* 124* 124*   POTASSIUM 4.0 3.7 3.9 4.4 4.1 3.8   CHLORIDE 96 97 96 94 95 94   CO2 19* 21 23 22 22 21   BUN 64* 51* 48* 42* 38* 32*   CR 2.71* 1.76* 1.68* 1.61* 1.62* 1.41*   * 107* 110* 115* 121* 90   OVIDIO 8.2* 8.8 8.5 8.4* 8.1* 8.1*       INR  Recent Labs   Lab 01/06/21  0634 01/05/21  0807 01/03/21  1544   INR 2.76* 2.97* 2.61*        DIAGNOSTICS:  Reviewed    A/P:  65 y.o man with alcohol abuse, admitted for acute alcoholic   hepatitis, consulted for worsening kidney injury.     # Patient with a serum creatinine of 0.62 mg/dl on June of 2019.   No other recent labs.     # Severe acute kidney injury: Admitted on January 3rd with serum   creatinine of 1.34 mg/dl. Gradually declining since admission,   but more so last two days with worsening lactic acidosis and   leukocytosis.    -Luis Carlos <5 and UA with hyaline and granular casts.      # Large volume ascites s/p 5.4 liters paracentesis on January 4th. He is having a lot of abdominal pain. Peritonitis?    # Alcoholic liver cirrhosis with  acute alcoholic hepatitis:   -on prednisone   -manage per GI    # Severe hyperbilirubinemia due to above.     # Hyponatremia due to liver cirrhosis.     # Severe hypoalbuminemia    # Peritonitis? On Rocephin.     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   Please call with questions or concerns.     Sanjiv Trent MD  [facility] - Nephrology  Office Phone: [phone]  Pager: [phone]   Cell count with differential fluid     Status: None   Result Value Ref Range    Body Fluid Analysis Source Ascites     % Neutrophils Fluid 1 %    % Lymphocytes Fluid 16 %    % Mono/Macro Fluid 79 %    % Other Cells Fluid 4 %    Color Fluid Yellow     Appearance Fluid Clear     WBC Fluid 207 /uL   Albumin fluid     Status: None   Result Value Ref Range    Albumin Fluid Source Ascites     Albumin Fluid 0.2 g/dL   Protein fluid     Status: None   Result Value Ref Range    Protein Total Fluid Source Ascites     Protein Total Fluid 0.9 g/dL   Gram stain     Status: None    Specimen: Ascites   Result Value Ref Range    Specimen Description Ascites     Special Requests Received in anaerobic tubes.     Gram Stain No organisms seen     Gram Stain       Many  WBC'S seen  predominantly mononuclear cells      Gram Stain       Quantification of host cells and microbiological organisms was done on a cytocentrifuged   preparation.     Fluid Culture Aerobic Bacterial     Status: None    Specimen: Ascites   Result Value Ref Range    Specimen Description Ascites     Special Requests Received in anaerobic tubes.     Culture Micro No growth

## 2021-01-11 NOTE — PROGRESS NOTES
GASTROENTEROLOGY PROGRESS NOTE        SUBJECTIVE:  Denies abdominal pain, 3 bowel movements yesterday. Mentation becoming more clear.      OBJECTIVE:    BP (!) 144/67 (BP Location: Left arm)   Pulse 87   Temp 98.5  F (36.9  C) (Axillary)   Resp 16   Wt 85.3 kg (188 lb)   SpO2 97%   BMI 30.34 kg/m    Temp (24hrs), Av.2  F (36.2  C), Min:96  F (35.6  C), Max:98.5  F (36.9  C)    Patient Vitals for the past 72 hrs:   Weight   21 0628 85.3 kg (188 lb)       Intake/Output Summary (Last 24 hours) at 2021 0842  Last data filed at 2021 0530  Gross per 24 hour   Intake 420 ml   Output 50 ml   Net 370 ml        PHYSICAL EXAM     Constitutional: chronically ill appearing  Abdomen: soft, round, NTTP  + asterixis          Additional Comments:  ROS, FH, SH: See initial GI consult for details.     I have reviewed the patient's new clinical lab results:     Recent Labs   Lab Test 21  0737 01/10/21  0632 21  0705 21  0634 21  0634 21  0807   WBC 21.7* 18.8* 24.5*   < > 14.4* 12.6*   HGB 9.2* 8.8* 10.1*   < > 11.3* 9.8*   * 108* 109*   < > 103* 104*   * 114* 170   < > 191 166   INR  --  4.01*  --   --  2.76* 2.97*    < > = values in this interval not displayed.     Recent Labs   Lab Test 21  0737 01/10/21  0632 21  0705   POTASSIUM 3.7 3.8 4.0   CHLORIDE 95 95 96   CO2 PENDING 23 19*   BUN PENDING 77* 64*   ANIONGAP PENDING 8 7     Recent Labs   Lab Test 21  0737 01/10/21  0632 01/10/21  0558 21  0705 21  2030 21  2030 21  1544   ALBUMIN PENDING 2.5*  --  1.5*   < >  --  1.5*   BILITOTAL PENDING 20.0*  --  20.7*   < >  --  21.8*   ALT PENDING 60  --  80*   < >  --  42   AST PENDING 82*  --  148*   < >  --  Canceled, Test credited   PROTEIN  --   --  10*  --   --  10*  --    LIPASE  --   --   --   --   --   --  706*    < > = values in this interval not displayed.        ASSESSMENT/ PLAN  Pramod Harris is a 65-year-old  male who was admitted 1/3/21 after presenting with multiple symptoms including abdominal distention, lower extremity edema, and jaundice. Found to have multiple lab abnormalities (hyponatremia, hyperbilirubinemia, elevated creatinine, elevated INR) and US showed cirrhosis with large volume ascites.     1. Cirrhosis with ascites / alcoholic hepatitis : Etiology unclear, but suspect alcohol. Elevated LFTs most likely represent acute alcohol hepatitis on chronic liver disease.  and so started on prednisolone. MELD-Na 40 today. Hepatitis A/B/C negative. Paracentesis performed 1/4 with 5.4 L removed, negative for SBP.      -- Trend LFTs, INR, renal function.  -- Agree with repeat paracentesis today w/ albumin  -- Prednisolone treatment for past 7 days. Lille score indicating no response. Steroid stopped today.     -- Holding diuretics for now due to YVONNE and hyponatremia. Can start Lasix/Aldactone when renal function improves.  -- Continue lactulose 20 mg TID and rifaximin.  -- Low sodium, high protein diet.  -- Alcohol cessation support.     2. Leukocytosis: may be related to steroids vs inflammatory process vs infection. Continue to monitor. Ascitic fluid gram stain and fluid culture without growth.  Infectious work up per medicine    3. Acute kidney injury : nephrology following, treating for HRS-1. On octreotide, albumin infusionand midodrine      Discussed with Dr. Gretta Mesa PA-C  [facility] ( [facility])

## 2021-01-11 NOTE — PLAN OF CARE
/60 (BP Location: Right arm)   Pulse 87   Temp 97.4  F (36.3  C) (Oral)   Resp 16   Wt 85.3 kg (188 lb)   SpO2 99%   BMI 30.34 kg/m        Alert to self only. A2 w/ GBW, unsteady on feet. PIV infusing NS @ 100ml/hr. Diet advanced to DD2, tolerated well today and ate most of lunch. Gums bleeding after lunch, MD paged and Vit K oral solution order and given. IV Albumin given. Pt had paracentesis this AM with 3900 ml out. Renal US done, see results. Neph following, palliative following, GI following, see notes. Lactulose given, 1 loose BM this shift, pt is incontinent of BB. Will continue POC.

## 2021-01-11 NOTE — PLAN OF CARE
VSS. AOX2; disoriented to situation and time.  Breathing is unlabored and pt denies pain.  Assist of 1-2 with gait belt and walker.  Dd1 diet with thin liquids; straws ok.  Patient did not attempt to get up without staff this shift.    Pt urinating every hour; incontinent.    Pt refused dinner, stated he was not hungry.    Right AC PIV leaking; removed and placed new PIV on lower right arm.

## 2021-01-11 NOTE — PLAN OF CARE
VSS. Alert to self. Ax2 w/ walker & gb or rafael steady depending on strength. Denies pain. Plan for para today. Will cont plan of care.

## 2021-01-11 NOTE — PROGRESS NOTES
Assessment and Plan:   YVONNE: diagnosed with HRS. On IV alb, midodrine, octreotide 200 mcg/IV.    Labs show Na 124, K 3.7, HCO3 20. Cr 2.83.     Replace K to limit ammoniagenesis.  Cont alb, midodrine and octreo. Add some NS.   Follow labs    Prognosis is limited.             Interval History:   Alcoholism  Alcoholic hepatitis:  Cirrhosis and ascites. S/P large volume paracentesis.               Review of Systems:   C/O dizziness, no abd pain. No SOB. S/P paracentesis tis am.           Medications:       albumin human  25 g Intravenous BID     cefTRIAXone  1 g Intravenous Q24H     folic acid  1 mg Oral Daily     lactulose  10 g Oral TID     midodrine  10 mg Oral TID w/meals     multivitamin w/minerals  1 tablet Oral Daily     octreotide  200 mcg Intravenous Q8H     pantoprazole  40 mg Oral QAM AC     rifaximin  550 mg Oral BID     thiamine  100 mg Oral Daily         Current active medications and PTA medications reviewed, see medication list for details.            Physical Exam:   Vitals were reviewed  Patient Vitals for the past 24 hrs:   BP Temp Temp src Pulse Resp SpO2   21 1010 124/65 -- -- 80 16 100 %   21 0927 139/80 -- -- 89 16 100 %   21 0817 (!) 144/67 98.5  F (36.9  C) Axillary 87 16 97 %   21 0044 133/71 97.1  F (36.2  C) Axillary 88 18 99 %   01/10/21 1615 119/67 96  F (35.6  C) Axillary 90 18 99 %       Temp:  [96  F (35.6  C)-98.5  F (36.9  C)] 98.5  F (36.9  C)  Pulse:  [80-90] 80  Resp:  [16-18] 16  BP: (119-144)/(65-80) 124/65  SpO2:  [97 %-100 %] 100 %    Temperatures:  Current - Temp: 98.5  F (36.9  C); Max - Temp  Av.2  F (36.2  C)  Min: 96  F (35.6  C)  Max: 98.5  F (36.9  C)  Respiration range: Resp  Av.8  Min: 16  Max: 18  Pulse range: Pulse  Av.8  Min: 80  Max: 90  Blood pressure range: Systolic (24hrs), Av , Min:119 , Max:144   ; Diastolic (24hrs), Av, Min:65, Max:80    Pulse oximetry range: SpO2  Av %  Min: 97 %  Max: 100 %    I/O  last 3 completed shifts:  In: 420 [P.O.:420]  Out: 150 [Urine:150]      Intake/Output Summary (Last 24 hours) at 1/11/2021 1101  Last data filed at 1/11/2021 0530  Gross per 24 hour   Intake 420 ml   Output 50 ml   Net 370 ml       Alert, responsive, icteric  Skin with spider angiomata  Lungs with clear ant BS  Cor RRR nl S1 S2 no M  Abd slightly distended, non-tender, no fluid wave.  LE + edema       Wt Readings from Last 4 Encounters:   01/09/21 85.3 kg (188 lb)   08/16/19 91.2 kg (201 lb)   08/01/19 91.2 kg (201 lb)   06/28/19 90.8 kg (200 lb 3.2 oz)          Data:          Lab Results   Component Value Date     01/11/2021     01/10/2021     01/09/2021    Lab Results   Component Value Date    CHLORIDE 95 01/11/2021    CHLORIDE 95 01/10/2021    CHLORIDE 96 01/09/2021    Lab Results   Component Value Date    BUN 90 01/11/2021    BUN 77 01/10/2021    BUN 64 01/09/2021      Lab Results   Component Value Date    POTASSIUM 3.7 01/11/2021    POTASSIUM 3.8 01/10/2021    POTASSIUM 4.0 01/09/2021      Lab Results   Component Value Date    CO2 20 01/11/2021    CO2 23 01/10/2021    CO2 19 01/09/2021    Lab Results   Component Value Date    CR 2.83 01/11/2021    CR 3.14 01/10/2021    CR 2.71 01/09/2021        Recent Labs   Lab Test 01/11/21  0737 01/10/21  0632 01/09/21  0705   WBC 21.7* 18.8* 24.5*   HGB 9.2* 8.8* 10.1*   HCT 26.5* 24.6* 28.5*   * 108* 109*   * 114* 170     Recent Labs   Lab Test 01/11/21  0737 01/10/21  0632 01/09/21  0705 01/07/21  0655 01/07/21  0655 01/06/21  1142 01/03/21  1544 01/03/21  1544   * 82* 148*   < > 117*  --    < > Canceled, Test credited   ALT 69 60 80*   < > 56  --    < > 42   GGT  --   --   --   --   --   --   --  255*   ALKPHOS 121 109 151*   < > 174*  --    < > 180*   BILITOTAL 21.7* 20.0* 20.7*   < > 19.6*  --    < > 21.8*   ARLETTE  --   --   --   --  43 82*  --  25    < > = values in this interval not displayed.       Recent Labs   Lab Test  01/03/21 2004   MAG 2.4*     Recent Labs   Lab Test 01/08/21  0635 01/03/21 2004   PHOS 3.5 3.2     Recent Labs   Lab Test 01/11/21  0737 01/10/21  0632 01/09/21  0705   OVIDIO 8.5 8.2* 8.2*       Lab Results   Component Value Date    OVIDIO 8.5 01/11/2021     Lab Results   Component Value Date    WBC 21.7 (H) 01/11/2021    HGB 9.2 (L) 01/11/2021    HCT 26.5 (L) 01/11/2021     (H) 01/11/2021     (L) 01/11/2021     Lab Results   Component Value Date     (L) 01/11/2021    POTASSIUM 3.7 01/11/2021    CHLORIDE 95 01/11/2021    CO2 20 01/11/2021     (H) 01/11/2021     Lab Results   Component Value Date    BUN 90 (H) 01/11/2021    CR 2.83 (H) 01/11/2021     Lab Results   Component Value Date    MAG 2.4 (H) 01/03/2021     Lab Results   Component Value Date    PHOS 3.5 01/08/2021       Creatinine   Date Value Ref Range Status   01/11/2021 2.83 (H) 0.66 - 1.25 mg/dL Final   01/10/2021 3.14 (H) 0.66 - 1.25 mg/dL Final   01/09/2021 2.71 (H) 0.66 - 1.25 mg/dL Final   01/08/2021 1.76 (H) 0.66 - 1.25 mg/dL Final   01/07/2021 1.68 (H) 0.66 - 1.25 mg/dL Final   01/06/2021 1.61 (H) 0.66 - 1.25 mg/dL Final       Attestation:  I have reviewed today's vital signs, notes, medications, labs and imaging.     Michael Castaneda MD

## 2021-01-11 NOTE — PROVIDER NOTIFICATION
"1237: MD paged, \"FYI; Pts gums are bleeding a lot. Pt denies pain. Thank you. I do not see an INR from today. \"       Addendum: MD placed order for Vit K oral solution  "

## 2021-01-12 NOTE — PLAN OF CARE
Vitals VSS  Neuro Alert to self only, slow to respond  Respiratory 98% RA, LS diminished  Cardiac/Tele No tele, apical pulse regular  GI/ Incontinent of B&B. Icteric, malodorous urine. Lactulose- loose stools, none on night shift.   Skin Jaundice, scattered bruises and scabs. Paracentesis site covered with band aid, band-aid clean, dry, and intact.  LDAs NS infusing @ 100 mL/hr  Labs , lactate 1.7  Diet DD2/thin liquids   Activity A2 gait belt and walker, very unsteady   Plan Neph, nutrition, palliative, speech, WOC, and PT/OT following. Continue to monitor.     Q2hr repositions

## 2021-01-12 NOTE — PLAN OF CARE
"/72 (BP Location: Right arm)   Pulse 91   Temp 95.2  F (35.1  C) (Axillary)   Resp 16   Ht 1.676 m (5' 6\")   Wt 86.6 kg (190 lb 13.9 oz)   SpO2 98%   BMI 30.81 kg/m      Alert to self only. A2, very weak. Up to chair for breakfast. Pt has poor appetite. INR 3.22. Albumin 2.5, IV Albumin given. Palliative, PT, OT, GI following. Will continue POC.   "

## 2021-01-12 NOTE — PROGRESS NOTES
"CLINICAL NUTRITION SERVICES - REASSESSMENT NOTE    Recommendations Ordered by Registered Dietitian (RD):   Diet per SLP -  aggressive push for oral nutrition supplements to increase calorie and protein intake vs need for nutrition support (per MD discretion). MD to formally consult if additional nutrition interventions pursued.    Continue oral nutritional supplements as ordered    Malnutrition:   % Weight Loss:  > 2% in 1 week (severe malnutrition)  % Intake:  </= 50% for >/= 5 days (severe malnutrition)  Subcutaneous Fat Loss: mild or greater, as outlined previously - pt sleeping at time of visit today   Muscle Loss: mild or greater, as outlined previously - pt sleeping at time of visit today   Fluid Retention:  trace    Malnutrition Diagnosis: Severe malnutrition  In Context of:  Acute illness or injury  Environmental or social circumstances     EVALUATION OF PROGRESS TOWARD GOALS   Diet: DD3 + Thin Liquids + Boost Plus BID    Intake/Tolerance: Upon review of the flowsheets, pt is consuming 0-75% of meals ordered throughout admission. Ordering meals consistently TID when diet order allows. Meeting </=50 of estimated needs since admit.     Factors affecting nutrition intake include: ETOH withdrawal, altered mental status    ASSESSED NUTRITION NEEDS (PER APPROVED PRACTICE GUIDELINES, Dosing weight: 87 kg):  Estimated Energy Needs: 2006-1597 kcal (25-30 kcal/kg)   Justification: minimum maintenance  Estimated Protein Needs: 104+ grams protein (1.2+ g per kg)  Justification: preservation of lean body mass and liver disease (per GI)  Estimated Fluid Needs: per MD    NEW FINDINGS:   - nephrology following   - GI following   - SLP following   - Palliative following, per their note on 1/11 \"Son agrees with plan for enteral nutrition\"   - Per MD note on 1/12: \"Patient's oral intake is improving, he ate 75% of dinner last night, daily evaluation regarding need for [place] tube for feeding, hold on for now\"   - weight: weight is " down 8.3 kg since admission (9%) - combination lean body mass loss and volume removed during para  Vitals:    01/03/21 2000 01/04/21 0632 01/06/21 0615 01/07/21 0402   Weight: 94.9 kg (209 lb 3.2 oz) 90.2 kg (198 lb 12.8 oz) 87.8 kg (193 lb 9.6 oz) 87.1 kg (192 lb)    01/09/21 0628 01/12/21 0631   Weight: 85.3 kg (188 lb) 86.6 kg (190 lb 13.9 oz)     Wt Readings from Last 10 Encounters:   01/12/21 86.6 kg (190 lb 13.9 oz)   08/16/19 91.2 kg (201 lb)   08/01/19 91.2 kg (201 lb)   06/28/19 90.8 kg (200 lb 3.2 oz)   12/21/17 100.3 kg (221 lb 3.2 oz)   11/06/17 103.9 kg (229 lb)   10/24/17 103.9 kg (229 lb)     - medications:      albumin human  25 g Intravenous BID     cefTRIAXone  1 g Intravenous Q24H     folic acid  1 mg Oral Daily     lactulose  10 g Oral TID     midodrine  10 mg Oral TID w/meals     multivitamin w/minerals  1 tablet Oral Daily     octreotide  200 mcg Intravenous Q8H     pantoprazole  40 mg Oral QAM AC     rifaximin  550 mg Oral BID     thiamine  100 mg Oral Daily         artificial saliva, bisacodyl, glucose **OR** dextrose **OR** glucagon, flumazenil, melatonin, ondansetron **OR** ondansetron, phenylephrine-mineral oil-petrolatum, prochlorperazine **OR** prochlorperazine **OR** prochlorperazine, QUEtiapine, witch hazel-glycerin   -labs:    Electrolytes  Potassium (mmol/L)   Date Value   01/12/2021 4.1   01/11/2021 3.7   01/10/2021 3.8     Phosphorus (mg/dL)   Date Value   01/12/2021 2.9   01/08/2021 3.5   01/03/2021 3.2    Blood Glucose  Glucose (mg/dL)   Date Value   01/12/2021 137 (H)   01/11/2021 169 (H)   01/10/2021 126 (H)   01/09/2021 109 (H)   01/08/2021 107 (H)    Inflammatory Markers  WBC (10e9/L)   Date Value   01/12/2021 24.7 (H)   01/11/2021 21.7 (H)   01/10/2021 18.8 (H)     Albumin (g/dL)   Date Value   01/12/2021 2.5 (L)   01/11/2021 2.6 (L)   01/10/2021 2.5 (L)      Magnesium (mg/dL)   Date Value   01/12/2021 3.4 (H)   01/03/2021 2.4 (H)     Sodium (mmol/L)   Date Value    01/12/2021 127 (L)   01/11/2021 124 (L)   01/10/2021 126 (L)    Renal  Urea Nitrogen (mg/dL)   Date Value   01/12/2021 86 (H)   01/11/2021 90 (H)   01/10/2021 77 (H)     Creatinine (mg/dL)   Date Value   01/12/2021 2.55 (H)   01/11/2021 2.83 (H)   01/10/2021 3.14 (H)     Additional  Ketones Urine (mg/dL)   Date Value   01/10/2021 Negative        Previous Goals:   Patient to consume >/= 50% of meals TID and >/=2 high protein supplements per day  Evaluation: Not met    Previous Nutrition Diagnosis:   Inadequate nutrient intake (protein energy) related to ETOH withdrawal, encephalopathy and ascites as evidenced by intake of <50% of estimated needs for >5 days, ascites masking full degree of weight loss, fat and muscle wasting   Evaluation: No change    MALNUTRITION  % Weight Loss:  > 2% in 1 week (severe malnutrition)  % Intake:  </= 50% for >/= 5 days (severe malnutrition)  Subcutaneous Fat Loss: mild or greater, as outlined previously - pt sleeping at time of visit today   Muscle Loss: mild or greater, as outlined previously - pt sleeping at time of visit today   Fluid Retention:  trace    Malnutrition Diagnosis: Severe malnutrition  In Context of:  Acute illness or injury  Environmental or social circumstances    CURRENT NUTRITION DIAGNOSIS  Inadequate nutrient intake (protein energy) related to ETOH withdrawal, encephalopathy and ascites as evidenced by intake of <50% of estimated needs for >5 days, ascites masking full degree of weight loss, fat and muscle wasting     INTERVENTIONS  Recommendations / Nutrition Prescription  Diet per SLP -  aggressive push for oral nutrition supplements to increase calorie and protein intake vs need for nutrition support (per MD discretion). MD to formally consult if additional nutrition interventions pursued.    Continue oral nutritional supplements as ordered     Implementation  Collaboration and Referral of Nutrition care: discussed pt during interdisciplinary rounds this  morning     Goals  Patient to consume >/= 50% of meals TID and >/=2 high protein supplements per day vs initiation of nutrition support     MONITORING AND EVALUATION:  Progress towards goals will be monitored and evaluated per protocol and Practice Guidelines    Jane Herbert RD, LD  Clinical Dietitian

## 2021-01-12 NOTE — PLAN OF CARE
8:55 PM  Text paged MD re: Pt is DM type 2. Holding PTA metformin. Do you want QID blood glucose checks and sliding scale?    9:00 PM  New orders placed to check blood glucose QID and sliding scale ordered.     Admission: Pt admitted on 1/3 for evaluation of altered mental status. Feels very weak and dizzy when he walks  History: DM type 2, GERD, Subarachnoid hemorrhage, See chart for complete list   Labs/Protocols: Na-124, K+-3.7, Creat-2.83, Bilirubin-21.7, AST-105  Vitals: Temp: 96.5  F (35.8  C) Temp src: Axillary BP: 119/67 Pulse: 90   Resp: 18 SpO2: 99 % O2 Device: None (Room air)    Pain: Denies pain   Cardiac: Regular rate and rhythm  Respiratory: Lungs are diminished-Remains on RA   Neuro: Alert to self, Disorientated to place, time, situation-Slow to answer  GI/: Incontinent of B/B-Lactulose loose BM-Icteric urine-Scheduled Albumin/octreotide/midodrine-Neph following-Paracentesis done during day shift 3.9 liters off right side C/D/I-Abx-Rocephin for abdominal infection-Renal ultrasound showed normal kidneys-Creat still elevated-Palliative consulted for goals of care-  Skin: Bruised/Scattered scabs-WOC consulted   LDA: PIV-NS 100ml/hr   Diet: DD2/Thin liquids-Ate 75% of dinner needed to be fed-Nutrition following-Gums bled some with eating-If nutritional intake doesn't improve could look at possible [place] tube for feedings   Activity: A-2 gait belt and walker-Very unsteady-PT/OT consulted   Pt educated on current POC: Monitor mentation, Continue ABX, Encourage oral intake   Discharge Plan: > 4 days

## 2021-01-12 NOTE — PROGRESS NOTES
XC.    History of diabetes on Metformin at baseline.    Started sliding scale insulin, low intensity.

## 2021-01-12 NOTE — PROGRESS NOTES
Assessment and Plan:   YVONNE: HRS. Getting IV alb, midodrine, octreotide. Got IV NS for 1500 ml. Also got K replacement.   I/O not recorded. Wt up 1.3 kg. Cr: 3.14 > 2.83 > 2.55. Na improved to 127.     HRS responding to treatment. Cont octreo and alb one more day then stop. Follow labs.             Interval History:   Alcoholism  Cirrhosis and ascites                 Review of Systems:   Unable.           Medications:       albumin human  25 g Intravenous BID     cefTRIAXone  1 g Intravenous Q24H     folic acid  1 mg Oral Daily     lactulose  10 g Oral TID     midodrine  10 mg Oral TID w/meals     multivitamin w/minerals  1 tablet Oral Daily     octreotide  200 mcg Intravenous Q8H     pantoprazole  40 mg Oral QAM AC     rifaximin  550 mg Oral BID     thiamine  100 mg Oral Daily         Current active medications and PTA medications reviewed, see medication list for details.            Physical Exam:   Vitals were reviewed  Patient Vitals for the past 24 hrs:   BP Temp Temp src Pulse Resp SpO2 Weight   21 0720 130/72 95.2  F (35.1  C) Axillary 91 16 98 % --   21 0631 -- -- -- -- -- -- 86.6 kg (190 lb 13.9 oz)   21 0101 136/69 98.3  F (36.8  C) Axillary 94 16 98 % --   21 0023 133/66 98.3  F (36.8  C) Axillary 97 18 98 % --   21 2346 130/72 96.3  F (35.7  C) Oral 94 16 98 % --   21 1911 119/67 96.5  F (35.8  C) Axillary 90 18 99 % --   21 1547 138/64 97.9  F (36.6  C) Oral 98 18 99 % --   21 1100 114/60 97.4  F (36.3  C) Oral 87 16 99 % --   21 1010 124/65 -- -- 80 16 100 % --       Temp:  [95.2  F (35.1  C)-98.3  F (36.8  C)] 95.2  F (35.1  C)  Pulse:  [80-98] 91  Resp:  [16-18] 16  BP: (114-138)/(60-72) 130/72  SpO2:  [98 %-100 %] 98 %    Temperatures:  Current - Temp: 95.2  F (35.1  C); Max - Temp  Av.1  F (36.2  C)  Min: 95.2  F (35.1  C)  Max: 98.3  F (36.8  C)  Respiration range: Resp  Av.8  Min: 16  Max: 18  Pulse range: Pulse  Av.4   Min: 80  Max: 98  Blood pressure range: Systolic (24hrs), Av , Min:114 , Max:138   ; Diastolic (24hrs), Av, Min:60, Max:72    Pulse oximetry range: SpO2  Av.6 %  Min: 98 %  Max: 100 %    I/O last 3 completed shifts:  In: 360 [P.O.:360]  Out: -       Intake/Output Summary (Last 24 hours) at 2021 0940  Last data filed at 2021 1830  Gross per 24 hour   Intake 360 ml   Output --   Net 360 ml       Alert, icteric  Lungs with bibasilar rales and diminished BS  Cor RRR nl S1 S2 no M  LE no edema       Wt Readings from Last 4 Encounters:   21 86.6 kg (190 lb 13.9 oz)   19 91.2 kg (201 lb)   19 91.2 kg (201 lb)   19 90.8 kg (200 lb 3.2 oz)          Data:          Lab Results   Component Value Date     2021     2021     01/10/2021      Lab Results   Component Value Date    CHLORIDE 101 2021    CHLORIDE 95 2021    CHLORIDE 95 01/10/2021    Lab Results   Component Value Date    BUN 86 2021    BUN 90 2021    BUN 77 01/10/2021      Lab Results   Component Value Date    POTASSIUM 4.1 2021    POTASSIUM 3.7 2021    POTASSIUM 3.8 01/10/2021    Lab Results   Component Value Date    CO2 20 2021    CO2 20 2021    CO2 23 01/10/2021    Lab Results   Component Value Date    CR 2.55 2021    CR 2.83 2021    CR 3.14 01/10/2021        Recent Labs   Lab Test 21  0638 21  0737 01/10/21  0632   WBC 24.7* 21.7* 18.8*   HGB 9.2* 9.2* 8.8*   HCT 26.4* 26.5* 24.6*   * 110* 108*   * 135* 114*     Recent Labs   Lab Test 21  0638 21  0737 01/10/21  0632 21  0655 21  0655 21  1142 21  1544 21  1544   * 105* 82*   < > 117*  --    < > Canceled, Test credited   ALT 83* 69 60   < > 56  --    < > 42   GGT  --   --   --   --   --   --   --  255*   ALKPHOS 123 121 109   < > 174*  --    < > 180*   BILITOTAL 22.7* 21.7* 20.0*   < > 19.6*  --    < > 21.8*    ARLETTE  --   --   --   --  43 82*  --  25    < > = values in this interval not displayed.       Recent Labs   Lab Test 01/12/21  0638 01/03/21 2004   MAG 3.4* 2.4*     Recent Labs   Lab Test 01/12/21  0638 01/08/21  0635 01/03/21 2004   PHOS 2.9 3.5 3.2     Recent Labs   Lab Test 01/12/21  0638 01/11/21  0737 01/10/21  0632   OVIDIO 8.3* 8.5 8.2*     Lab Results   Component Value Date    OVIDIO 8.3 (L) 01/12/2021     Lab Results   Component Value Date    WBC 24.7 (H) 01/12/2021    HGB 9.2 (L) 01/12/2021    HCT 26.4 (L) 01/12/2021     (H) 01/12/2021     (L) 01/12/2021     Lab Results   Component Value Date     (L) 01/12/2021    POTASSIUM 4.1 01/12/2021    CHLORIDE 101 01/12/2021    CO2 20 01/12/2021     (H) 01/12/2021     Lab Results   Component Value Date    BUN 86 (H) 01/12/2021    CR 2.55 (H) 01/12/2021     Lab Results   Component Value Date    MAG 3.4 (H) 01/12/2021     Lab Results   Component Value Date    PHOS 2.9 01/12/2021       Creatinine   Date Value Ref Range Status   01/12/2021 2.55 (H) 0.66 - 1.25 mg/dL Final   01/11/2021 2.83 (H) 0.66 - 1.25 mg/dL Final   01/10/2021 3.14 (H) 0.66 - 1.25 mg/dL Final   01/09/2021 2.71 (H) 0.66 - 1.25 mg/dL Final   01/08/2021 1.76 (H) 0.66 - 1.25 mg/dL Final   01/07/2021 1.68 (H) 0.66 - 1.25 mg/dL Final       Attestation:  I have reviewed today's vital signs, notes, medications, labs and imaging.     Michael Castaneda MD

## 2021-01-12 NOTE — PROGRESS NOTES
DATE:  1/12/2021   TIME OF RECEIPT FROM LAB:  0742  LAB TEST:  Total bili  LAB VALUE:  22.7  RESULTS GIVEN WITH READ-BACK TO (PROVIDER):  LEODAN Coughlin  TIME LAB VALUE REPORTED TO PROVIDER:  0742    Informed primary RN Cristiane Newman

## 2021-01-12 NOTE — PROGRESS NOTES
GASTROENTEROLOGY PROGRESS NOTE        SUBJECTIVE:  Denies abdominal pain, 2 bowel movements yesterday.  Some confusion this morning.  No fever no chills.     OBJECTIVE:    /72 (BP Location: Right arm)   Pulse 91   Temp 95.2  F (35.1  C) (Axillary)   Resp 16   Wt 86.6 kg (190 lb 13.9 oz)   SpO2 98%   BMI 30.81 kg/m    Temp (24hrs), Av.2  F (36.2  C), Min:96  F (35.6  C), Max:98.5  F (36.9  C)    Patient Vitals for the past 72 hrs:   Weight   21 0631 86.6 kg (190 lb 13.9 oz)       Intake/Output Summary (Last 24 hours) at 2021 0842  Last data filed at 2021 0530  Gross per 24 hour   Intake 420 ml   Output 50 ml   Net 370 ml        PHYSICAL EXAM     Constitutional: chronically ill appearing  Abdomen: soft, round, NTTP         Additional Comments:  ROS, FH, SH: See initial GI consult for details.     I have reviewed the patient's new clinical lab results:     Recent Labs   Lab Test 21  0621  0737 01/10/21  0632 21  0634 21  0634   WBC 24.7* 21.7* 18.8*   < > 14.4*   HGB 9.2* 9.2* 8.8*   < > 11.3*   * 110* 108*   < > 103*   * 135* 114*   < > 191   INR 3.22*  --  4.01*  --  2.76*    < > = values in this interval not displayed.     Recent Labs   Lab Test 21  0621  0737 01/10/21  0632   POTASSIUM 4.1 3.7 3.8   CHLORIDE 101 95 95   CO2 20 20 23   BUN 86* 90* 77*   ANIONGAP 6 9 8     Recent Labs   Lab Test 21  0638 21  0737 01/10/21  0632 01/10/21  0558 21  2030 21  2030 21  1544   ALBUMIN 2.5* 2.6* 2.5*  --    < >  --  1.5*   BILITOTAL 22.7* 21.7* 20.0*  --    < >  --  21.8*   ALT 83* 69 60  --    < >  --  42   * 105* 82*  --    < >  --  Canceled, Test credited   PROTEIN  --   --   --  10*  --  10*  --    LIPASE  --   --   --   --   --   --  706*    < > = values in this interval not displayed.        ASSESSMENT/ PLAN  Pramod Harris is a 65-year-old male who was admitted 1/3/21 after presenting  with multiple symptoms including abdominal distention, lower extremity edema, and jaundice. Found to have multiple lab abnormalities (hyponatremia, hyperbilirubinemia, elevated creatinine, elevated INR) and US showed cirrhosis with large volume ascites.     1. Cirrhosis with ascites / alcoholic hepatitis : Etiology unclear, but suspect alcohol. Elevated LFTs most likely represent acute alcohol hepatitis on chronic liver disease.  and so started on prednisolone. MELD-Na 40 today. Hepatitis A/B/C negative. Paracentesis performed 1/4 with 5.4 L removed, negative for SBP.  Paracentesis 1/11 now positive for SBP.     -- Trend LFTs, INR, renal function.  -- Prednisolone treatment for past 7 days. Lille score indicating no response. Steroid stopped today.   -- Holding diuretics for now due to YVONNE and hyponatremia. Can start Lasix/Aldactone when renal function improves.  -- Continue lactulose 20 mg TID and rifaximin.  -- Low sodium, high protein diet.  -- Alcohol cessation support.    2.  SBP: Paracentesis 1/11 revealing high PMN count positive for SBP.  3.9 L were removed.  He was started on ceftriaxone.  Steroids were discontinued yesterday as he is in no responder.  Awaiting Gram stain and culture.     3. Leukocytosis: may be related to steroids vs inflammatory process vs infection. Continue to monitor.  SBP as mentioned above.  Awaiting Gram stain and culture.    3. Acute kidney injury : nephrology following, treating for HRS-1. On octreotide, albumin infusionand midodrine      Discussed with Dr. Gretta Mesa, SIVA  [facility] ( [facility])
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
## 2021-01-13 NOTE — PROGRESS NOTES
Assessment and Plan:   YVONNE: considered HRS. Labs show K 4.0, HCO3 18, Na 126. Cr improvin.83 > 2.55 > 1.98. Wt increasin.3 > 86.6 > 87.4.   On IV alb, midodrine, octreotide IV.     Cont current treatment for 24 more h then stop octreo.  IV NS bolus today.   Follow labs.            Interval History:   Alcoholism:   Liver failure: icteric, ascites, elevated INR.      SBP:  Rocephin. Elevated WBC. Low plt count, anemia.            Review of Systems:   Lethargic, O X 1 Only. Up only with assistance. He does respond to ? Some abd pain. Taking small amounts po.        Medications:       albumin human  25 g Intravenous BID     cefTRIAXone  1 g Intravenous Q24H     folic acid  1 mg Oral Daily     lactulose  10 g Oral TID     midodrine  10 mg Oral TID w/meals     multivitamin w/minerals  1 tablet Oral Daily     octreotide  200 mcg Intravenous Q8H     pantoprazole  40 mg Oral QAM AC     rifaximin  550 mg Oral BID     thiamine  100 mg Oral Daily         Current active medications and PTA medications reviewed, see medication list for details.            Physical Exam:   Vitals were reviewed  Patient Vitals for the past 24 hrs:   BP Temp Temp src Pulse Resp SpO2 Weight   21 0930 122/67 -- -- 90 -- -- --   21 0714 (!) 141/67 96.2  F (35.7  C) Axillary 88 18 100 % --   21 0430 -- -- -- -- -- -- 87.4 kg (192 lb 9.6 oz)   21 2353 (!) 140/67 98.6  F (37  C) Oral 90 16 99 % --   21 1902 133/69 -- -- 93 -- 98 % --   21 1556 128/65 97.9  F (36.6  C) Oral 92 18 100 % --       Temp:  [96.2  F (35.7  C)-98.6  F (37  C)] 96.2  F (35.7  C)  Pulse:  [88-93] 90  Resp:  [16-18] 18  BP: (122-141)/(65-69) 122/67  SpO2:  [98 %-100 %] 100 %    Temperatures:  Current - Temp: 96.2  F (35.7  C); Max - Temp  Av.6  F (36.4  C)  Min: 96.2  F (35.7  C)  Max: 98.6  F (37  C)  Respiration range: Resp  Av.3  Min: 16  Max: 18  Pulse range: Pulse  Av.6  Min: 88  Max: 93  Blood pressure range:  Systolic (24hrs), Av , Min:122 , Max:141   ; Diastolic (24hrs), Av, Min:65, Max:69    Pulse oximetry range: SpO2  Av.3 %  Min: 98 %  Max: 100 %    I/O last 3 completed shifts:  In: 120 [P.O.:120]  Out: -       Intake/Output Summary (Last 24 hours) at 2021 1051  Last data filed at 2021 0915  Gross per 24 hour   Intake 240 ml   Output --   Net 240 ml       Alert, does respond to questions  Lungs with clear ant BS  Cor RRR nl S1 S2 no M  Skin deep icterus, + spiders  LE tr edema  Abd distended + fluid wave       Wt Readings from Last 4 Encounters:   21 87.4 kg (192 lb 9.6 oz)   19 91.2 kg (201 lb)   19 91.2 kg (201 lb)   19 90.8 kg (200 lb 3.2 oz)          Data:          Lab Results   Component Value Date     2021     2021     2021    Lab Results   Component Value Date    CHLORIDE 99 2021    CHLORIDE 101 2021    CHLORIDE 95 2021        Lab Results   Component Value Date    BUN 75 2021    BUN 86 2021    BUN 90 2021        Lab Results   Component Value Date    POTASSIUM 4.0 2021    POTASSIUM 4.1 2021    POTASSIUM 3.7 2021    Lab Results   Component Value Date    CO2 18 2021    CO2 20 2021    CO2 20 2021    Lab Results   Component Value Date    CR 1.98 2021    CR 2.55 2021    CR 2.83 2021        Recent Labs   Lab Test 21  0739 21  0638 21  0737   WBC 25.8* 24.7* 21.7*   HGB 9.6* 9.2* 9.2*   HCT 26.0* 26.4* 26.5*   * 111* 110*   PLT 79* 113* 135*     Recent Labs   Lab Test 21  0739 21  0638 21  0737 21  0655 21  0655 21  1142 21  1544 21  1544   * 149* 105*   < > 117*  --    < > Canceled, Test credited   ALT 81* 83* 69   < > 56  --    < > 42   GGT  --   --   --   --   --   --   --  255*   ALKPHOS 116 123 121   < > 174*  --    < > 180*   BILITOTAL 24.9* 22.7* 21.7*   < >  19.6*  --    < > 21.8*   ARLETTE  --   --   --   --  43 82*  --  25    < > = values in this interval not displayed.       Recent Labs   Lab Test 01/12/21  0638 01/03/21 2004   MAG 3.4* 2.4*     Recent Labs   Lab Test 01/12/21  0638 01/08/21  0635 01/03/21 2004   PHOS 2.9 3.5 3.2     Recent Labs   Lab Test 01/13/21  0739 01/12/21  0638 01/11/21  0737   OVIDIO 8.1* 8.3* 8.5       Lab Results   Component Value Date    OVIDIO 8.1 (L) 01/13/2021     Lab Results   Component Value Date    WBC 25.8 (H) 01/13/2021    HGB 9.6 (L) 01/13/2021    HCT 26.0 (L) 01/13/2021     (H) 01/13/2021    PLT 79 (L) 01/13/2021     Lab Results   Component Value Date     (L) 01/13/2021    POTASSIUM 4.0 01/13/2021    CHLORIDE 99 01/13/2021    CO2 18 (L) 01/13/2021     (H) 01/13/2021     Lab Results   Component Value Date    BUN 75 (H) 01/13/2021    CR 1.98 (H) 01/13/2021     Lab Results   Component Value Date    MAG 3.4 (H) 01/12/2021     Lab Results   Component Value Date    PHOS 2.9 01/12/2021       Creatinine   Date Value Ref Range Status   01/13/2021 1.98 (H) 0.66 - 1.25 mg/dL Final   01/12/2021 2.55 (H) 0.66 - 1.25 mg/dL Final   01/11/2021 2.83 (H) 0.66 - 1.25 mg/dL Final   01/10/2021 3.14 (H) 0.66 - 1.25 mg/dL Final   01/09/2021 2.71 (H) 0.66 - 1.25 mg/dL Final   01/08/2021 1.76 (H) 0.66 - 1.25 mg/dL Final       Attestation:  I have reviewed today's vital signs, notes, medications, labs and imaging.     Michael Castaneda MD

## 2021-01-13 NOTE — PROGRESS NOTES
"[facility]  Palliative Care Progress Note  Text Page    I joined Hospitalist Duy Hook MD in meeting with the patient. Pramod is able to converse and explain \"I know I want every thing done.\" He not able to give insight on his medical condition, but states \"I know I need to stop drinking.\" In asking what he needs to support his plan he explained \"My wife is a good woman. She will take care of me.\" In assisting him in reorienting and asking if he knew what day of the week it was, Pramod asked \"Am I going to die today?\" I offered that I do not think that it would be today and asked what would help him live longer. Which he responded \"Do you know I have a dog at home?\"  He could not recall his dogs name. In asking what would help his health, he explained \"My son will need me to help him.\"    I phoned the patient's son Fito at [phone] and asked what he understood about his father's health. He was able to give insight into his father's worsening liver and renal function and the \"plan to start feeding him with a tube.\" I acknowledged his father's appetite has been poor and it is a plan to consider tube feedings, but it is also important to respect his father's wishes if he understood his condition. Fito again acknowledged \"I know my father would want everything done.\" We discussed the benefit and burden of tube feedings given his father's poor liver function. I offered concern due Pramod's cognitive status that he may pull out a nasogastric tube feeding. I acknowledged that all restorative efforts will be continued, but at any time it does not seem to be a medical plan his father would want, their can be an option to consider options for comfort. Fito plans to talk with family and Pramod's significant other, Heather about options and will call our office back about their decision in going forward.         I spoke with Fito later in the day. He had a chance to talk with family, " "Izzy and his father and acknowledged \"He wants everything done.\" I answered questions about tube feeding placement and about hepatic encephalopathy.       Assessment & Plan   1.  Decisional Capacity -  Unreliable. Patient does not have an advance directive. Per  informed consent policy next of kin should be involved in all consent and decision making. The patient has three children Fito, Stuart and Maxine who are next of kin. He has been estranged from Stuart and Maxine, but Fito indicates they have been notified about the patient's hospitalization.      2.  Encephalopathy - Due to end stage liver disease. Continue lactulose and rifaximin per [place] [facility] recommendations. Patient does not currently demonstrate medical capacity.      3.  Dysphagia - Appreciate input of Speech Therapist Brady Mesa, SLP recommendation for Dysphagia Diet Level 1 with thin liquids.       4.  Severe malnutrition - Appreciate input of Registered Dietitian Jane Herbert, RD, LD. Son agrees with plan for enteral nutrition.     5.  Abdominal pain - Patient currently denies. Status post paracentesis 1/4/2021 for large volume ascites and underwent second paracentesis of 3,900 mL 1/11/2021.      6.  Generalized weakness - Therapy following and TCU recommended due to debilitated state.       7.  Spiritual Care - Appreciate input of  Tyler Weathers MA.      8.  Care Planning - Appreciate input of  DEYVI Craig     Goals of Care: FULL CODE - Restorative care. Son is interested in pursuing TUBE FEEDING.    Ivory Jack MS, RN, CNS, APRN, ACHPN, FAACVPR  Pain and Palliative Care  Pager [phone]  Office [phone]     Time Spent on this Encounter   Total unit/floor time 11:05 AM until 11:25 AM and 1:25 PM until 2:40 PM until, time consisted of the following, examination of the patient, reviewing the record and completing documentation. >50% of time spent in counseling and coordination of care.  Time spend counseling " with patient and family consisted of the following topics, goals of care, education about diagnosis and symptom management.  Time spent in coordination of care with Bedside Nurse Nhung Hansen RN and Hospitalist Duy Hook MD.     Interval History   Chart reviewed - patient continues to be confused     Palliative Symptom Review (0=no symptom/no concern, 1=mild, 2=moderate, 3=severe):      Pain: 0-none      Fatigue: 2-moderate      Nausea: 0-none      Constipation: 1-mild      Diarrhea: 0-none      Depressive Symptoms: 0-none      Anxiety: 2-moderate      Drowsiness: 1-mild      Poor Appetite: 3-severe      Shortness of Breath: 0-none      Insomnia: 0-none          Physical Exam   Temp:  [96.2  F (35.7  C)-98.6  F (37  C)] 96.2  F (35.7  C)  Pulse:  [88-93] 90  Resp:  [16-18] 18  BP: (122-141)/(65-69) 122/67  SpO2:  [98 %-100 %] 100 %  192 lbs 9.6 oz  GEN:  Jaundiced, alert, oriented to self only, appears comfortable, no apparent distress.  HEENT:  Normocephalic/atraumatic, scleral icterus, no nasal discharge, mouth dry.  CV:  RRR, S1, S2; no murmurs or other irregularities noted.  +3 DP/PT pulses bilaterally; bilateral leg edema.  RESP:  Clear to auscultation bilaterally without rales/rhonchi/wheezing/retractions.  Symmetric chest rise on inhalation noted.  Normal respiratory effort.  ABD:  Rounded, soft, non-tender/distended.  +BS    M/S:   Denies pain with palpation of extremities.    SKIN:  Jaundiced, warm and dry to touch, large purple bruise over upper right leg.   PAIN BEHAVIOR: Cooperative  Psych:  Confused, tangential conversation.     Medications       sodium chloride 0.9%  1,000 mL Intravenous Once     albumin human  25 g Intravenous BID     cefTRIAXone  1 g Intravenous Q24H     folic acid  1 mg Oral Daily     lactulose  20 g Oral TID     midodrine  10 mg Oral TID w/meals     multivitamin w/minerals  1 tablet Oral Daily     octreotide  200 mcg Intravenous Q8H     pantoprazole  40 mg Oral QAM AC      rifaximin  550 mg Oral BID     thiamine  100 mg Oral Daily       Data   Results for orders placed or performed during the hospital encounter of 01/03/21 (from the past 24 hour(s))   Glucose by meter   Result Value Ref Range    Glucose 125 (H) 70 - 99 mg/dL   Glucose by meter   Result Value Ref Range    Glucose 142 (H) 70 - 99 mg/dL   Glucose by meter   Result Value Ref Range    Glucose 131 (H) 70 - 99 mg/dL   CBC with platelets   Result Value Ref Range    WBC 25.8 (H) 4.0 - 11.0 10e9/L    RBC Count 2.43 (L) 4.4 - 5.9 10e12/L    Hemoglobin 9.6 (L) 13.3 - 17.7 g/dL    Hematocrit 26.0 (L) 40.0 - 53.0 %     (H) 78 - 100 fl    MCH 39.5 (H) 26.5 - 33.0 pg    MCHC 36.9 (H) 31.5 - 36.5 g/dL    RDW 14.2 10.0 - 15.0 %    Platelet Count 79 (L) 150 - 450 10e9/L   Comprehensive metabolic panel   Result Value Ref Range    Sodium 126 (L) 133 - 144 mmol/L    Potassium 4.0 3.4 - 5.3 mmol/L    Chloride 99 94 - 109 mmol/L    Carbon Dioxide 18 (L) 20 - 32 mmol/L    Anion Gap 9 3 - 14 mmol/L    Glucose 130 (H) 70 - 99 mg/dL    Urea Nitrogen 75 (H) 7 - 30 mg/dL    Creatinine 1.98 (H) 0.66 - 1.25 mg/dL    GFR Estimate 34 (L) >60 mL/min/[1.73_m2]    GFR Estimate If Black 40 (L) >60 mL/min/[1.73_m2]    Calcium 8.1 (L) 8.5 - 10.1 mg/dL    Bilirubin Total 24.9 (HH) 0.2 - 1.3 mg/dL    Albumin 2.8 (L) 3.4 - 5.0 g/dL    Protein Total 5.8 (L) 6.8 - 8.8 g/dL    Alkaline Phosphatase 116 40 - 150 U/L    ALT 81 (H) 0 - 70 U/L     (H) 0 - 45 U/L   INR   Result Value Ref Range    INR 3.43 (H) 0.86 - 1.14   Glucose by meter   Result Value Ref Range    Glucose 166 (H) 70 - 99 mg/dL
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
## 2021-01-13 NOTE — PROGRESS NOTES
A&O to self only. LS diminished, but clear. Lethargic and somnolent. Up with extensive assist of 2 wit gait belt and walker. Incontinent of B&B. No BM during this shift. Skin is jaundiced. Urine is orange in color. Distended abdomen. Turn and repositioned in bed. Assistance with meals. Continue monitoring.

## 2021-01-13 NOTE — PLAN OF CARE
VSS pt confused. A&Ox1, alarms in place. Pt jaundice, on PO Lactulose, no BM since 11th, md aware, increased Lactulose.  Paracentesis site C/D/I. On IV Rocephin. On scheduled Albumin. X1 bolus NS to run 10hours then s.l.  Speech and PT following.

## 2021-01-13 NOTE — PROGRESS NOTES
"GASTROENTEROLOGY PROGRESS NOTE    SUBJECTIVE:  He denies abd pain.  Reports he has been eating.    OBJECTIVE:    /67 (BP Location: Right arm)   Pulse 90   Temp 96.2  F (35.7  C) (Axillary)   Resp 18   Ht 1.676 m (5' 6\")   Wt 87.4 kg (192 lb 9.6 oz)   SpO2 100%   BMI 31.09 kg/m    Temp (24hrs), Av.6  F (36.4  C), Min:96.2  F (35.7  C), Max:98.6  F (37  C)    Patient Vitals for the past 72 hrs:   Weight   21 0430 87.4 kg (192 lb 9.6 oz)   21 0631 86.6 kg (190 lb 13.9 oz)       Intake/Output Summary (Last 24 hours) at 2021 1543  Last data filed at 2021 1301  Gross per 24 hour   Intake 717 ml   Output --   Net 717 ml         PHYSICAL EXAM    Constitutional: NAD, slight confusion  abd- distended with ascites  Skin- jaundice        Additional Comments:  ROS, FH, SH: See initial GI consult for details.    I have reviewed the patient's new clinical lab results:    Recent Labs   Lab Test 21  0831 21  0721  0638 21  0737 01/10/21  0632   WBC  --  25.8* 24.7* 21.7* 18.8*   HGB  --  9.6* 9.2* 9.2* 8.8*   MCV  --  107* 111* 110* 108*   PLT  --  79* 113* 135* 114*   INR 3.43*  --  3.22*  --  4.01*     Recent Labs   Lab Test 21  0739 21  0638 21  0737   * 127* 124*   POTASSIUM 4.0 4.1 3.7   CHLORIDE 99 101 95   CO2 18* 20 20   BUN 75* 86* 90*   CR 1.98* 2.55* 2.83*   ANIONGAP 9 6 9   OVIDIO 8.1* 8.3* 8.5     Recent Labs   Lab Test 21  0739 21  0638 21  0737 01/10/21  0558 01/10/21  0558 21  1544   ALBUMIN 2.8* 2.5* 2.6*   < >  --    < >  --  1.5*   BILITOTAL 24.9* 22.7* 21.7*   < >  --    < >  --  21.8*   ALT 81* 83* 69   < >  --    < >  --  42   * 149* 105*   < >  --    < >  --  Canceled, Test credited   ALKPHOS 116 123 121   < >  --    < >  --  180*   PROTEIN  --   --   --   --  10*  --  10*  --    LIPASE  --   --   --   --   --   --   --  706*    < > = values in this interval not " displayed.         Assessment/Plan  Alcoholic liver cirrhosis, alcoholic hepatitis, SBP 1/11/21, HRS, HE  - SBP- today is day 3 of diagnosis and recommend 1gm/kg of albumin.  He is already receiving 50 grams/day for his HRS so I will add another 50 gm for today.    No other new recommendations today.  We will continue to follow.        Ender Glynn MD  [facility]  Office: [phone]

## 2021-01-13 NOTE — PROGRESS NOTES
SPIRITUAL HEALTH SERVICES Progress Note  [facility] 3    Spiritual Health visit per consult order for emotional/spiritual support.  Pt declined SHS earlier in admission. Pt SO,  Heather suggested pt may benefit from visit at this time.  Attempted x2.  Pt on phone and sleeping.  Will assess for needs as  pt is available.    Plan: Spiritual Health Services remains available for additional emotional/spiritual support.    Tyler Weathers MA  Staff   Pager: [phone]  Phone: [phone]

## 2021-01-13 NOTE — PLAN OF CARE
End of Shift Note:  For VS and complete assessments, please see documentation flowsheets.     Pertinent shift assessments: Pt Alert to self, VSS. B, 131. Slow speech. Pt denies pain, N/V, SOB, lightheadedness, and dizziness. Incontinent of urine; cody/orange urine. Gums bleeding, very small amount, at times. Assist of two; pt remained in bed for duration of shift. Provided frequent turn/repo. Diet: Dd3, thin liquids. Pt has difficulty lifting cup/spoon to mouth. Fed self jello this AM with assistance. PT/OT, Speech, WOC following. Plan for discharge TBD; likely greater than 4 days. Will continue with POC.

## 2021-01-14 NOTE — PROGRESS NOTES
"GASTROENTEROLOGY PROGRESS NOTE    SUBJECTIVE: The patient has no concerns today.  He denies abdominal pain.  He is asking for coffee.  He has eaten some of his dinner but not a large amount.  I encouraged him to continue on working on his food intake as that is very important to help his liver heal.    OBJECTIVE:    /58 (BP Location: Right arm)   Pulse 80   Temp 96.3  F (35.7  C) (Oral)   Resp 18   Ht 1.676 m (5' 6\")   Wt 92.4 kg (203 lb 11.2 oz)   SpO2 100%   BMI 32.88 kg/m    Temp (24hrs), Av.2  F (35.7  C), Min:96  F (35.6  C), Max:96.3  F (35.7  C)    Patient Vitals for the past 72 hrs:   Weight   21 0642 92.4 kg (203 lb 11.2 oz)   21 0430 87.4 kg (192 lb 9.6 oz)   21 0631 86.6 kg (190 lb 13.9 oz)       Intake/Output Summary (Last 24 hours) at 2021 1607  Last data filed at 2021 1114  Gross per 24 hour   Intake 1520 ml   Output --   Net 1520 ml         PHYSICAL EXAM    He is sitting on his chair having dinner.  He is jaundiced.  His abdomen is distended with ascites.  Nonlabored breathing.        Additional Comments:  ROS, FH, SH: See initial GI consult for details.    I have reviewed the patient's new clinical lab results:    Recent Labs   Lab Test 21  0821  0831 21  0739 21  0638 01/10/21  0632 01/10/21  0632   WBC 25.9*  --  25.8* 24.7*   < > 18.8*   HGB 9.4*  --  9.6* 9.2*   < > 8.8*   *  --  107* 111*   < > 108*   PLT 67*  --  79* 113*   < > 114*   INR  --  3.43*  --  3.22*  --  4.01*    < > = values in this interval not displayed.     Recent Labs   Lab Test 21  0809 21  0739 21  0638   * 126* 127*   POTASSIUM 4.2 4.0 4.1   CHLORIDE 99 99 101   CO2 16* 18* 20   BUN 79* 75* 86*   CR 1.96* 1.98* 2.55*   ANIONGAP 9 9 6   OVIDIO 8.5 8.1* 8.3*     Recent Labs   Lab Test 21  0809 21  0739 21  0638 01/10/21  0558 01/10/21  0558 21  2030 21  2030 21  1544   ALBUMIN 3.0* 2.8* 2.5*   " < >  --    < >  --  1.5*   BILITOTAL 22.9* 24.9* 22.7*   < >  --    < >  --  21.8*   ALT 72* 81* 83*   < >  --    < >  --  42   * 132* 149*   < >  --    < >  --  Canceled, Test credited   ALKPHOS 112 116 123   < >  --    < >  --  180*   PROTEIN  --   --   --   --  10*  --  10*  --    LIPASE  --   --   --   --   --   --   --  706*    < > = values in this interval not displayed.         Assessment and plan:  Alcoholic hepatitis, SBP, hepatorenal syndrome, hepatic encephalopathy, alcoholic liver cirrhosis.    I have no new recommendations today.  Continue with supportive cares.  I once again encouraged the patient to continue to push his nutrition intake as that is important to help heal his liver.  We will continue to follow.      Ender Glynn MD  [facility]  Office: [phone]

## 2021-01-14 NOTE — PROGRESS NOTES
WO Nurse Inpatient Wound Assessment   Reason for consultation: Evaluate and treat  Right flank wounds    Assessment  Right flank wounds due to thoracentesis site   Status: unchanged  Small agranular area draining large serous drainage  Treatment Plan  Right flank wounds: BID   1. Cleanse with wound cleanser and dry  2. Apply No Sting around wound to protect skin  3. Cover with dry gauze   Orders Updated  Recommended provider order: None, at this time  WOC Nurse follow-up plan:weekly  Nursing to notify the Provider(s) and re-consult the WOC Nurse if wound(s) deteriorates or new skin concern.    Patient History  According to provider note(s):  This is a 65-year-old male with a history of diabetes mellitus, alcohol use disorder, depression, and intracranial hemorrhage, presenting to [facility] for evaluation of abdominal distention.  History is obtained from my discussion with a significant other at the bedside.  The history from the patient was also obtained, but somewhat limited due to confusion.  I also discussed the case with the ED physician, Dr. Tyson.  The electronic medical record was also reviewed.     Objective Data    Active Diet Order  Orders Placed This Encounter      Combination Diet Dysphagia Diet Level 3: Advanced; Thin Liquids (water, ice chips, juice, milk gelatin, ice cream, etc); 2 gm NA Diet; Thin Liquids (water, ice chips, juice, milk, gelatin, ice cream, etc)      Output:   I/O last 3 completed shifts:  In: 957 [P.O.:957]  Out: -     Risk Assessment:   Sensory Perception: 4-->no impairment  Moisture: 3-->occasionally moist  Activity: 3-->walks occasionally  Mobility: 3-->slightly limited  Nutrition: 2-->probably inadequate  Friction and Shear: 2-->potential problem  Dagoberto Score: 17                          Labs:   Recent Labs   Lab 01/14/21  0809 01/13/21  0831   ALBUMIN 3.0*  --    HGB 9.4*  --    INR  --  3.43*   WBC 25.9*  --        Physical Exam  Areas of skin assessed:  focused Right flank    Wound Location:  Right flank  Date of last photo none  Wound History: Patient had recent thoracentesis     Wound Base: 100 % agranular     Palpation of the wound bed: normal      Drainage: Large     Description of drainage: serous     Measurements (length x width x depth, in cm) 0.2 x 0.5 cm      Tunneling N/A     Undermining N/A  Periwound skin: intact      Color: normal and consistent with surrounding tissue      Temperature: normal   Odor: none  Pain: denies , none    Interventions  Visual inspection and assessment completed   Wound Care Rationale Improve absorptive capacity  Wound Care: done per plan of care  Supplies: at bedside  Current off-loading measures: Pillows under calves and Pillows  Current support surface: Standard  Atmos Air mattress  Education provided to: plan of care  Discussed plan of care with Patient    Cecil Fabian RN CWOCN

## 2021-01-14 NOTE — CONSULTS
Faxed Rheumatology referral/office notes to Dr. Polanco     [phone]    Faxed PT/OT orders to [facility]     [phone]   NUTRITION BRIEF NOTE + CONSULT: TF Assess and Order  See RD note 1/12 for full reassessment details    New findings in last 24 hours:    Diet order: DD3 + 2 gram Na + thin liquids. Sub optimal intake ongoing with 0-25% intake for majority of meals x12 days since admit (a few meals at 75% intake)    Planned FT placement by IR today    Generalized weakness     Last BM: 1/11 despite lactulose    Labs: reviewed   Platelets 67 (downtrending)    INR   Date Value Ref Range Status   01/13/2021 3.43 (H) 0.86 - 1.14 Final          Electrolytes  Potassium (mmol/L)   Date Value   01/14/2021 4.2   01/13/2021 4.0   01/12/2021 4.1     Phosphorus (mg/dL)   Date Value   01/12/2021 2.9   01/08/2021 3.5   01/03/2021 3.2        Blood Glucose  Glucose (mg/dL)   Date Value   01/14/2021 136 (H)   01/13/2021 130 (H)   01/12/2021 137 (H)   01/11/2021 169 (H)   01/10/2021 126 (H)        Inflammatory Markers  WBC (10e9/L)   Date Value   01/14/2021 25.9 (H)   01/13/2021 25.8 (H)   01/12/2021 24.7 (H)     Albumin (g/dL)   Date Value   01/14/2021 3.0 (L)   01/13/2021 2.8 (L)   01/12/2021 2.5 (L)        Magnesium (mg/dL)   Date Value   01/12/2021 3.4 (H)   01/03/2021 2.4 (H)     Sodium (mmol/L)   Date Value   01/14/2021 124 (L)   01/13/2021 126 (L)   01/12/2021 127 (L)        Renal  Urea Nitrogen (mg/dL)   Date Value   01/14/2021 79 (H)   01/13/2021 75 (H)   01/12/2021 86 (H)     Creatinine (mg/dL)   Date Value   01/14/2021 1.96 (H)   01/13/2021 1.98 (H)   01/12/2021 2.55 (H)         Additional  Ketones Urine (mg/dL)   Date Value   01/10/2021 Negative            albumin human  25 g Intravenous BID     cefTRIAXone  1 g Intravenous Q24H     folic acid  1 mg Oral Daily     lactulose  20 g Oral TID     midodrine  10 mg Oral TID w/meals     multivitamin w/minerals  1 tablet Oral Daily     octreotide  200 mcg Intravenous Q8H     pantoprazole  40 mg Oral QAM AC     rifaximin  550 mg Oral BID     sodium chloride (PF)  3 mL Intracatheter Q8H     thiamine   100 mg Oral Daily     Assessed Nutrition Needs (DW: 87 kg):  Estimated Energy Needs: 9879-1326 kcal (25-30 kcal/kg)   Justification: minimum maintenance  Estimated Protein Needs: 104+ grams protein (1.2+ g per kg)  Justification: preservation of lean body mass and liver disease (per GI)  Estimated Fluid Needs: per MD    Interventions:  When enteral access obtained, recommend TF as follows:    Type of Feeding Tube: pending placement by IR    Enteral Frequency:  Continuous    Enteral Regimen: Replete with fiber at 90 mL/hr    Total Enteral Provisions: 2160 mL daily provides 2160 kcal, 138 g protein (1.6 g per kg), 268 g CHO, 32 g fiber and 1793 mL free water. Meets >100% of DRI's    Free Water Flush: 60 mL q4 hours    Phos add on and electrolyte check    Start at 15 mL/hr and increase by 15 mL q6 hours    Total goal rate pending adequacy of oral intake    Collaboration and Referral of care: Discussed patient during interdisciplinary care rounds this morning and with RN and Dr. Murray re: INR elevated, IR to place   Please page/consult as needed.      Kita Ramos, MS, RDN-AP, LD, CNSC  Pager - 3rd floor/ICU: [phone]  Pager - All other floors: [phone]  Pager - Weekend/holiday: [phone]  Office: [phone]

## 2021-01-14 NOTE — PROGRESS NOTES
Assessment and Plan:   YVONNE: HRS. Cr improvin.83 > 2.55 . 1.98 > 1.96. Na low at 124. K 4.2, HCO3 16.     Date/Time Weight Who   21 0642 92.4 kg (203 lb 11.2 oz) SR   21 0430 87.4 kg (192 lb 9.6 oz) CK   21 0631 86.6 kg (190 lb 13.9 oz) CK   21 0628 85.3 kg (188 lb)       nl renal US.     Getting IV NS, IV alb, midodrine and octreotide. Stop octreo. Cont saline and alb. Cont midodrine. Follow labs.    Re check labs in am.               Interval History:   Anemia: Hgb 9.4. Low plt count yest at 79.   Spontaneous Bacterial peritonitis: on rocephin, cx neg.                  Review of Systems:   Unable          Medications:       albumin human  25 g Intravenous BID     cefTRIAXone  1 g Intravenous Q24H     folic acid  1 mg Oral Daily     lactulose  20 g Oral TID     midodrine  10 mg Oral TID w/meals     multivitamin w/minerals  1 tablet Oral Daily     octreotide  200 mcg Intravenous Q8H     pantoprazole  40 mg Oral QAM AC     rifaximin  550 mg Oral BID     sodium chloride (PF)  3 mL Intracatheter Q8H     thiamine  100 mg Oral Daily         Current active medications and PTA medications reviewed, see medication list for details.            Physical Exam:   Vitals were reviewed  Patient Vitals for the past 24 hrs:   BP Temp Temp src Pulse Resp SpO2 Weight   21 0732 130/70 96  F (35.6  C) Oral 81 20 100 % --   21 0642 -- -- -- -- -- -- 92.4 kg (203 lb 11.2 oz)   21 2346 139/72 96.1  F (35.6  C) Oral 81 16 100 % --   21 1602 136/70 95.8  F (35.4  C) Axillary 87 18 99 % --       Temp:  [95.8  F (35.4  C)-96.1  F (35.6  C)] 96  F (35.6  C)  Pulse:  [81-87] 81  Resp:  [16-20] 20  BP: (130-139)/(70-72) 130/70  SpO2:  [99 %-100 %] 100 %    Temperatures:  Current - Temp: 96  F (35.6  C); Max - Temp  Av  F (35.6  C)  Min: 95.8  F (35.4  C)  Max: 96.1  F (35.6  C)  Respiration range: Resp  Av  Min: 16  Max: 20  Pulse range: Pulse  Av  Min: 81  Max:  Patient is here with mom, Tamar.        If any information or results need to be relayed from today's visit, the best way to contact the patient is via cell [phone]- Patient gives verbal permission to leave a detailed voicemail at the number provided.         87  Blood pressure range: Systolic (24hrs), Av , Min:130 , Max:139   ; Diastolic (24hrs), Av, Min:70, Max:72    Pulse oximetry range: SpO2  Av.7 %  Min: 99 %  Max: 100 %    I/O last 3 completed shifts:  In: 957 [P.O.:957]  Out: -       Intake/Output Summary (Last 24 hours) at 2021 1025  Last data filed at 2021 0959  Gross per 24 hour   Intake 1197 ml   Output --   Net 1197 ml       Alert, icteric  Lungs with clear BS  Cor RRR nl S1 S2 no m  abd distended and tympanitic, no fluid wave, non-tender  LE tr edema       Wt Readings from Last 4 Encounters:   21 92.4 kg (203 lb 11.2 oz)   19 91.2 kg (201 lb)   19 91.2 kg (201 lb)   19 90.8 kg (200 lb 3.2 oz)          Data:          Lab Results   Component Value Date     2021     2021     2021    Lab Results   Component Value Date    CHLORIDE 99 2021    CHLORIDE 99 2021    CHLORIDE 101 2021    Lab Results   Component Value Date    BUN 79 2021    BUN 75 2021    BUN 86 2021      Lab Results   Component Value Date    POTASSIUM 4.2 2021    POTASSIUM 4.0 2021    POTASSIUM 4.1 2021    Lab Results   Component Value Date    CO2 16 2021    CO2 18 2021    CO2 20 2021    Lab Results   Component Value Date    CR 1.96 2021    CR 1.98 2021    CR 2.55 2021        Recent Labs   Lab Test 21  0809 21  0739 21  0638   WBC 25.9* 25.8* 24.7*   HGB 9.4* 9.6* 9.2*   HCT 26.2* 26.0* 26.4*   * 107* 111*   PLT PENDING 79* 113*     Recent Labs   Lab Test 21  0809 21  0739 21  0638 21  0655 21  0655 21  1142 21  1544 21  1544   * 132* 149*   < > 117*  --    < > Canceled, Test credited   ALT 72* 81* 83*   < > 56  --    < > 42   GGT  --   --   --   --   --   --   --  255*   ALKPHOS 112 116 123   < > 174*  --    < > 180*   BILITOTAL 22.9* 24.9* 22.7*   < >  19.6*  --    < > 21.8*   ARLETTE  --   --   --   --  43 82*  --  25    < > = values in this interval not displayed.       Recent Labs   Lab Test 01/12/21  0638 01/03/21 2004   MAG 3.4* 2.4*     Recent Labs   Lab Test 01/12/21  0638 01/08/21  0635 01/03/21 2004   PHOS 2.9 3.5 3.2     Recent Labs   Lab Test 01/14/21  0809 01/13/21  0739 01/12/21  0638   OVIDIO 8.5 8.1* 8.3*       Lab Results   Component Value Date    OVIDIO 8.5 01/14/2021     Lab Results   Component Value Date    WBC 25.9 (H) 01/14/2021    HGB 9.4 (L) 01/14/2021    HCT 26.2 (L) 01/14/2021     (H) 01/14/2021    PLT PENDING 01/14/2021     Lab Results   Component Value Date     (L) 01/14/2021    POTASSIUM 4.2 01/14/2021    CHLORIDE 99 01/14/2021    CO2 16 (L) 01/14/2021     (H) 01/14/2021     Lab Results   Component Value Date    BUN 79 (H) 01/14/2021    CR 1.96 (H) 01/14/2021     Lab Results   Component Value Date    MAG 3.4 (H) 01/12/2021     Lab Results   Component Value Date    PHOS 2.9 01/12/2021       Creatinine   Date Value Ref Range Status   01/14/2021 1.96 (H) 0.66 - 1.25 mg/dL Final   01/13/2021 1.98 (H) 0.66 - 1.25 mg/dL Final   01/12/2021 2.55 (H) 0.66 - 1.25 mg/dL Final   01/11/2021 2.83 (H) 0.66 - 1.25 mg/dL Final   01/10/2021 3.14 (H) 0.66 - 1.25 mg/dL Final   01/09/2021 2.71 (H) 0.66 - 1.25 mg/dL Final       Attestation:  I have reviewed today's vital signs, notes, medications, labs and imaging.     Michael Castaneda MD

## 2021-01-14 NOTE — PLAN OF CARE
PT: Alert, confused. Initially agreeable to PT with encouragement; then declining. Again, education provided, pt continues to decline.

## 2021-01-14 NOTE — PLAN OF CARE
Vitals: Temp: 96.1  F (35.6  C) Temp src: Oral BP: 139/72 Pulse: 81   Resp: 16 SpO2: 100 % O2 Device: None (Room air)    Neuro: Disoriented to place and situation. Slow garbled speech. Cooperative throughout shift.   Respiratory: LS clr/dim, breathing unlabored  Cardiac/tele: WDL  GI/: Incontinence of bladder. No BM this shift, bowel sounds active x4. Abd distended.   Skin: Yellow in color  LDAs: PIV SL  Labs:   Diet: DD3 this liquids, 2gm na  Activity: A1 gb/w  Plan: Discharge TBD. Will continue POC.

## 2021-01-14 NOTE — PLAN OF CARE
VSS on RA, oriented to self but redirectable. Denies any pain, follows commands. Appears anxious at times. Increased lactulose from 10g to 20g. Received additional dose of scheduled IV albumin 50 g. On IV rocephin. Speech and PT following.  and 125 respectively. Possible tube feeding placement tomorrow. Continue POC.
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
## 2021-01-14 NOTE — PLAN OF CARE
VSS on midodrine. Pt confused., A&Ox1, up in ch with a1 walker and gait belt. Alarms in place. PT following. Pt jaundice. X2 paracentesis during this stray. INR elevated. Pt having rectal bleeding, scant amount. On Lactulose, still continues not to have BM will discuss with md. Lactulose was increased yesterday. Pt tolerating meals. Improved appetite this morning. Nephrology following, added Bolus IVFs NS to run for 10 hours then S.L. On IV Rocephin.  Pt to have feeding tube placed, needs to be NPO for IR to place per IR, will possibly attempt tomorrow. Nutrition following.

## 2021-01-15 NOTE — PLAN OF CARE
SLP: Patient NPO for potential FT placement. Discussed with RN and MD. Patient is doing well with swallowing and he can be advanced to regular diet with thin liquids when he is alert and not confused. When patient is no longer NPO, please advance diet to Regular with thin (2gm NA). Will follow up this weekend on tolerance and will sign off if patient tolerating Regular diet.

## 2021-01-15 NOTE — PROGRESS NOTES
CALORIE COUNT    Current Diet and Supplement Order:   NPO (per IR for possible nasoenteric feeding tube placement)  Previously DD3, thin liquids  Boost 10-2    Approximate Oral Intake for 1/14:   Calories: 930 kcals   Protein: 21 g     Breakfast: 680 kcal/14 g protein  Lunch: 150 kcal/5 g protein  Dinner: 100 kcal/2 g protein    Number of Meals Recorded: 3  Number of Snacks Recorded: 0    ASSESSED NUTRITION NEEDS: (DW 87 kg)  Estimated Energy Needs: 0912-0494 kcal (25-30 kcal/kg)   Justification: minimum maintenance  Estimated Protein Needs: 104+ grams protein (1.2+ g per kg)  Justification: preservation of lean body mass and liver disease (per GI)  Estimated Fluid Needs: per MD    Summary:   Continues to meet <50% of needs orally.  Calorie counts to continue - appreciate documentation of oral intake.  To IR today for FT placement? Orders already placed 1/14. Discussed with RN.    Updated by RN that radiologist prefers bedside placement, or at least initial placement and to please call provider directly. Discussed with radiologist covering today - RD not comfortable with bedside placement given elevated INR, Cortrak (vs live imaging in IR). Aside from charge ICU RN's, RD not aware of 3rd floor M/S RN's and hospital MD's that place FT's at bedside.  Paged Dr. Murray. IR will call 3rd floor to update when ready for pt arrival.     Kita Ramos, MS, RDN-AP, LD, CNSC  3rd floor/ICU: [phone]  All other floors: [phone]  Weekend/holiday: [phone]  Office: [phone]

## 2021-01-15 NOTE — PLAN OF CARE
Vitals: Temp: 97.2  F (36.2  C) Temp src: Oral BP: 131/67 Pulse: 87   Resp: 20 SpO2: 100 % O2 Device: None (Room air)    Neuro: Disoriented to time and situation. Lethargic.   Respiratory: LS clr/ dim. O2 stable on room air, snoring while sleeping  Cardiac/tele: WDL  GI/: Incontinent of bowel and bladder. Mult loose stools this shift (see flow sheets)  Skin: Jaundice in color  LDAs: PIV SL  Labs:   Diet: NPO for potential feeding tube placement   Activity: A2  Plan: PT/OT/SW following. Will continue POC.

## 2021-01-15 NOTE — PROGRESS NOTES
Chart check  Came by to see patient but he was off the floor with IR getting NJ tube placed.  Will see tomorrow.
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
## 2021-01-15 NOTE — PLAN OF CARE
Up in chair for late lunch. Poor appetite for dinner. Sleeping between cares. Skin jjaundiced and abd round and firm. Denies pain. Took medication when asked to.

## 2021-01-15 NOTE — PROGRESS NOTES
Nasogastric feeding tube placed by IR.  Tube at 82 cm at nare of left nare.  Placement verified post pyloric via fluoroscopy.  Report called   Cristiane Rico RN, BSN

## 2021-01-15 NOTE — PLAN OF CARE
Confused.  Follows direction.  Up with 2+walker.  Needs direction.  Jaundiced. Bruised. Edematous. Incontinent urine.  Keofeed placed by IR this afternoon.

## 2021-01-15 NOTE — PROGRESS NOTES
Assessment and Plan:   YVONNE: hepatorenal syndrome likely.   Labs show hyponatremia, Na 122. K 4.1, HCO3 18, Cr improved at 1.92. alb 3.4.   Continues on IV alb. Got NS bolus yesterday of 1 L. On midodrine.     Follow labs. Consider stopping alb IV over the weekend.   Will follow Na for now. No further saline as this may make things worse.             Interval History:   Liver failure: cirrhosis, ascites. T bili 23.1. Anemia and low plt count. WBC 22.3.   Alcoholism:         SBP: on rocephin.            Review of Systems:   Confused. Incontinent. Somnolent.           Medications:       albumin human  25 g Intravenous BID     cefTRIAXone  1 g Intravenous Q24H     folic acid  1 mg Oral Daily     lactulose  20 g Oral TID     midodrine  10 mg Oral TID w/meals     multivitamin w/minerals  1 tablet Oral Daily     pantoprazole  40 mg Oral QAM AC     rifaximin  550 mg Oral BID     sodium chloride (PF)  3 mL Intracatheter Q8H     sodium chloride (PF)  3 mL Intracatheter Q8H     thiamine  100 mg Oral Daily       dextrose       Current active medications and PTA medications reviewed, see medication list for details.            Physical Exam:   Vitals were reviewed  Patient Vitals for the past 24 hrs:   BP Temp Temp src Pulse Resp SpO2 Weight   01/15/21 1147 116/58 96.4  F (35.8  C) Oral 74 16 100 % --   01/15/21 0732 124/67 95.7  F (35.4  C) Oral 78 16 99 % --   01/15/21 0311 -- -- -- -- -- -- 90.9 kg (200 lb 8 oz)   21 2354 131/67 97.2  F (36.2  C) Oral 87 20 100 % --   21 1538 108/58 96.3  F (35.7  C) Oral 80 18 100 % --       Temp:  [95.7  F (35.4  C)-97.2  F (36.2  C)] 96.4  F (35.8  C)  Pulse:  [74-87] 74  Resp:  [16-20] 16  BP: (108-131)/(58-67) 116/58  SpO2:  [99 %-100 %] 100 %    Temperatures:  Current - Temp: 96.4  F (35.8  C); Max - Temp  Av.4  F (35.8  C)  Min: 95.7  F (35.4  C)  Max: 97.2  F (36.2  C)  Respiration range: Resp  Av.5  Min: 16  Max: 20  Pulse range: Pulse  Av.8  Min:  74  Max: 87  Blood pressure range: Systolic (24hrs), Av , Min:108 , Max:131   ; Diastolic (24hrs), Av, Min:58, Max:67    Pulse oximetry range: SpO2  Av.8 %  Min: 99 %  Max: 100 %    I/O last 3 completed shifts:  In:  [P.O.:]  Out: -       Intake/Output Summary (Last 24 hours) at 1/15/2021 1252  Last data filed at 2021 2300  Gross per 24 hour   Intake 630 ml   Output --   Net 630 ml       Icteric, resting in bed in no distress  Not on O2       Wt Readings from Last 4 Encounters:   01/15/21 90.9 kg (200 lb 8 oz)   19 91.2 kg (201 lb)   19 91.2 kg (201 lb)   19 90.8 kg (200 lb 3.2 oz)          Data:          Lab Results   Component Value Date     01/15/2021     2021     2021    Lab Results   Component Value Date    CHLORIDE 96 01/15/2021    CHLORIDE 99 2021    CHLORIDE 99 2021        Lab Results   Component Value Date    BUN 83 01/15/2021    BUN 79 2021    BUN 75 2021        Lab Results   Component Value Date    POTASSIUM 4.1 01/15/2021    POTASSIUM 4.2 2021    POTASSIUM 4.0 2021    Lab Results   Component Value Date    CO2 18 01/15/2021    CO2 16 2021    CO2 18 2021    Lab Results   Component Value Date    CR 1.92 01/15/2021    CR 1.96 2021    CR 1.98 2021        Recent Labs   Lab Test 01/15/21  1028 21  0809 21  0739   WBC 22.3* 25.9* 25.8*   HGB 9.1* 9.4* 9.6*   HCT 26.3* 26.2* 26.0*   * 109* 107*   PLT 64* 67* 79*     Recent Labs   Lab Test 01/15/21  1028 21  0809 21  0739 21  0655 21  0655 21  1142 21  1544 21  1544   * 121* 132*   < > 117*  --    < > Canceled, Test credited   ALT 78* 72* 81*   < > 56  --    < > 42   GGT  --   --   --   --   --   --   --  255*   ALKPHOS 113 112 116   < > 174*  --    < > 180*   BILITOTAL 23.1* 22.9* 24.9*   < > 19.6*  --    < > 21.8*   ARLETTE  --   --   --   --  43 82*  --  25    < >  = values in this interval not displayed.       Recent Labs   Lab Test 01/15/21  1028 01/12/21  0638 01/03/21 2004   MAG 3.4* 3.4* 2.4*     Recent Labs   Lab Test 01/15/21  1028 01/14/21  0809 01/12/21  0638   PHOS 3.2 3.0 2.9     Recent Labs   Lab Test 01/15/21  1028 01/14/21  0809 01/13/21  0739   OVIDIO 8.6 8.5 8.1*       Lab Results   Component Value Date    OVIDIO 8.6 01/15/2021     Lab Results   Component Value Date    WBC 22.3 (H) 01/15/2021    HGB 9.1 (L) 01/15/2021    HCT 26.3 (L) 01/15/2021     (H) 01/15/2021    PLT 64 (L) 01/15/2021     Lab Results   Component Value Date     (L) 01/15/2021    POTASSIUM 4.1 01/15/2021    CHLORIDE 96 01/15/2021    CO2 18 (L) 01/15/2021     (H) 01/15/2021     Lab Results   Component Value Date    BUN 83 (H) 01/15/2021    CR 1.92 (H) 01/15/2021     Lab Results   Component Value Date    MAG 3.4 (H) 01/15/2021     Lab Results   Component Value Date    PHOS 3.2 01/15/2021       Creatinine   Date Value Ref Range Status   01/15/2021 1.92 (H) 0.66 - 1.25 mg/dL Final     Comment:     Results confirmed by repeat test   01/14/2021 1.96 (H) 0.66 - 1.25 mg/dL Final   01/13/2021 1.98 (H) 0.66 - 1.25 mg/dL Final   01/12/2021 2.55 (H) 0.66 - 1.25 mg/dL Final   01/11/2021 2.83 (H) 0.66 - 1.25 mg/dL Final   01/10/2021 3.14 (H) 0.66 - 1.25 mg/dL Final       Attestation:  I have reviewed today's vital signs, notes, medications, labs and imaging.     Michael Castaneda MD

## 2021-01-16 NOTE — PROGRESS NOTES
GI Staff    Nasoenteric feeding tube placed yesterday.  When questioned he said his stomach hurt.    AF, VSS with some labored breathing  Jaundiced, lethargic    ABD: distended, firm, +BS    Labs reviewed: Creatinine approx 2  X 4 days, LFTs unchanged.    A/P: Alcoholic hepatitis with SBP/HRS. Uncomfortable from ascites with some restrictive ventilation. He failed therapy with steroids. Poor prognosis.    1. Nutrition as possible.  2. Paracentesis for comfort, ordered.

## 2021-01-16 NOTE — PROGRESS NOTES
Assessment and Plan:   YVONNE: HRS.   Cr: 1.92 > 2.03. Na 123, K 4.1. HCO3 19.   On alb IV bid, midodrine. Off NS and octreotide.     Change alb to daily. Stay off octreo.   Re check Luis Carlos and FENa in am.             Interval History:   Liver failure: cirrhosis, ascites.    Anemia and low plt count.   Alcoholism:         SBP: on rocephin.                  Review of Systems:   Confused.           Medications:       albumin human  25 g Intravenous BID     cefTRIAXone  1 g Intravenous Q24H     folic acid  1 mg Oral Daily     lactulose  20 g Oral TID     midodrine  10 mg Oral TID w/meals     multivitamin w/minerals  1 tablet Oral Daily     pantoprazole  40 mg Oral QAM AC     rifaximin  550 mg Oral BID     sodium chloride (PF)  3 mL Intracatheter Q8H     sodium chloride (PF)  3 mL Intracatheter Q8H     thiamine  100 mg Oral Daily       dextrose       Current active medications and PTA medications reviewed, see medication list for details.            Physical Exam:   Vitals were reviewed  Patient Vitals for the past 24 hrs:   BP Temp Temp src Pulse Resp SpO2 Weight   21 0752 135/61 97.5  F (36.4  C) Oral 86 16 98 % --   21 0657 -- -- -- -- -- -- 91.9 kg (202 lb 8 oz)   01/15/21 2355 121/58 96.1  F (35.6  C) Oral 83 16 99 % --   01/15/21 1541 117/63 95.3  F (35.2  C) Axillary 79 12 100 % --   01/15/21 1147 116/58 96.4  F (35.8  C) Oral 74 16 100 % --       Temp:  [95.3  F (35.2  C)-97.5  F (36.4  C)] 97.5  F (36.4  C)  Pulse:  [74-86] 86  Resp:  [12-16] 16  BP: (116-135)/(58-63) 135/61  SpO2:  [98 %-100 %] 98 %    Temperatures:  Current - Temp: 97.5  F (36.4  C); Max - Temp  Av.3  F (35.7  C)  Min: 95.3  F (35.2  C)  Max: 97.5  F (36.4  C)  Respiration range: Resp  Avg: 15  Min: 12  Max: 16  Pulse range: Pulse  Av.5  Min: 74  Max: 86  Blood pressure range: Systolic (24hrs), Av , Min:116 , Max:135   ; Diastolic (24hrs), Av, Min:58, Max:63    Pulse oximetry range: SpO2  Av.3 %  Min: 98  %  Max: 100 %    I/O last 3 completed shifts:  In: 889 [P.O.:360; NG/GT:484]  Out: -       Intake/Output Summary (Last 24 hours) at 1/16/2021 1113  Last data filed at 1/16/2021 1042  Gross per 24 hour   Intake 1989 ml   Output --   Net 1989 ml       Alert, deeply icteric  ND FT in place.   Lungs clear BS  Cor RRR nl S1 S2 no M  Abd: firm and NT, distended.      Wt Readings from Last 4 Encounters:   01/16/21 91.9 kg (202 lb 8 oz)   08/16/19 91.2 kg (201 lb)   08/01/19 91.2 kg (201 lb)   06/28/19 90.8 kg (200 lb 3.2 oz)          Data:          Lab Results   Component Value Date     01/16/2021     01/15/2021     01/14/2021    Lab Results   Component Value Date    CHLORIDE 97 01/16/2021    CHLORIDE 96 01/15/2021    CHLORIDE 99 01/14/2021        Lab Results   Component Value Date    BUN 90 01/16/2021    BUN 83 01/15/2021    BUN 79 01/14/2021        Lab Results   Component Value Date    POTASSIUM 4.1 01/16/2021    POTASSIUM 4.1 01/15/2021    POTASSIUM 4.2 01/14/2021    Lab Results   Component Value Date    CO2 19 01/16/2021    CO2 18 01/15/2021    CO2 16 01/14/2021    Lab Results   Component Value Date    CR 2.03 01/16/2021    CR 1.92 01/15/2021    CR 1.96 01/14/2021        Recent Labs   Lab Test 01/16/21  0645 01/15/21  1028 01/14/21  0809   WBC 24.3* 22.3* 25.9*   HGB 9.0* 9.1* 9.4*   HCT 25.5* 26.3* 26.2*   * 110* 109*   PLT 60* 64* 67*     Recent Labs   Lab Test 01/15/21  1028 01/14/21  0809 01/13/21  0739 01/07/21  0655 01/07/21  0655 01/06/21  1142 01/03/21  1544 01/03/21  1544   * 121* 132*   < > 117*  --    < > Canceled, Test credited   ALT 78* 72* 81*   < > 56  --    < > 42   GGT  --   --   --   --   --   --   --  255*   ALKPHOS 113 112 116   < > 174*  --    < > 180*   BILITOTAL 23.1* 22.9* 24.9*   < > 19.6*  --    < > 21.8*   ARLETTE  --   --   --   --  43 82*  --  25    < > = values in this interval not displayed.       Recent Labs   Lab Test 01/16/21  0645 01/15/21  1028  01/12/21  0638   MAG 3.4* 3.4* 3.4*     Recent Labs   Lab Test 01/16/21  0645 01/15/21  1028 01/14/21  0809   PHOS 3.4 3.2 3.0     Recent Labs   Lab Test 01/16/21  0645 01/15/21  1028 01/14/21  0809   OVIDIO 8.5 8.6 8.5       Lab Results   Component Value Date    OVIDIO 8.5 01/16/2021     Lab Results   Component Value Date    WBC 24.3 (H) 01/16/2021    HGB 9.0 (L) 01/16/2021    HCT 25.5 (L) 01/16/2021     (H) 01/16/2021    PLT 60 (L) 01/16/2021     Lab Results   Component Value Date     (L) 01/16/2021    POTASSIUM 4.1 01/16/2021    CHLORIDE 97 01/16/2021    CO2 19 (L) 01/16/2021     (H) 01/16/2021     Lab Results   Component Value Date    BUN 90 (H) 01/16/2021    CR 2.03 (H) 01/16/2021     Lab Results   Component Value Date    MAG 3.4 (H) 01/16/2021     Lab Results   Component Value Date    PHOS 3.4 01/16/2021       Creatinine   Date Value Ref Range Status   01/16/2021 2.03 (H) 0.66 - 1.25 mg/dL Final   01/15/2021 1.92 (H) 0.66 - 1.25 mg/dL Final     Comment:     Results confirmed by repeat test   01/14/2021 1.96 (H) 0.66 - 1.25 mg/dL Final   01/13/2021 1.98 (H) 0.66 - 1.25 mg/dL Final   01/12/2021 2.55 (H) 0.66 - 1.25 mg/dL Final   01/11/2021 2.83 (H) 0.66 - 1.25 mg/dL Final       Attestation:  I have reviewed today's vital signs, notes, medications, labs and imaging.     Michael Castaneda MD

## 2021-01-16 NOTE — PLAN OF CARE
SLP: Per discussion with RN, patient has been fed and is tolerating a Regular diet with thin liquids. Speech will complete order at this time. Continue with general safe swallow strategies: upright for all po and remain upright for 15-30 minutes after meals; small bites/sips; slow pace.
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
## 2021-01-16 NOTE — PLAN OF CARE
Vitals: Temp: 96.1  F (35.6  C) Temp src: Oral BP: 121/58 Pulse: 83   Resp: 16 SpO2: 99 % O2 Device: None (Room air)    Neuro: disoriented to time and situation. Garbled speech  Respiratory: LS dim, breathing unlabored  Cardiac/tele: WDL  GI/: incontinent of bowel and bladder, abd distended.   Skin: jaundice  LDAs: NG tube w/ continuous feeding, PIV to right SL  Labs:   Diet: 2gm na/ tube feeding continuous  Activity: A2  Plan: Discharge TBD. Will continue POC.

## 2021-01-16 NOTE — PLAN OF CARE
VS stable, tube feeding started at 1700 15cc/h. Tolerated well. Incontinent of bladder, no stool, lactulose given. Patient somnolent, open eyes spontaneously. Continue to monitor.

## 2021-01-16 NOTE — PLAN OF CARE
"Awake and at times able to understand a few words. Like \"I feel like Im dying\".  Follows commands and assists with turns. Fed breakfast as unable to hold glass or utensils.  Ate well for bkft. Declined lunch. Appears uncomfortable.Lethargic  And slow responces   Abdomen appears larger and firmer today.  Seen by Rounder and GI.   Down now for paracentsis now.  Has had 2 incontinent brown soft stools and 3 urines  "

## 2021-01-16 NOTE — PROGRESS NOTES
CALORIE COUNT    Current Diet and Supplement Order:   2 gram Na  Boost 10-2    Approximate Oral Intake for 1/15:   Calories: 450 kcals   Protein: 22 g     Number of Meals Recorded: 1 - NPO x 2 meals for FT placement in IR  Number of Snacks Recorded: 0    ASSESSED NUTRITION NEEDS: (DW 87 kg)  Estimated Energy Needs: 8532-7864 kcal (25-30 kcal/kg)   Justification: minimum maintenance  Estimated Protein Needs: 104+ grams protein (1.2+ g per kg)  Justification: preservation of lean body mass and liver disease (per GI)  Estimated Fluid Needs: per MD    Summary:   Continues to meet <50% of needs orally. TF initiated 1/15, advancing to goal rate of Replete with fiber at 90 mL/hr. Total goal volume to be adjusted based on adequacy of oral intake.  Calorie counts to continue - appreciate documentation of oral intake.     Kita Ramos MS, RDN-AP, LD, CNSC  3rd floor/ICU: [phone]  All other floors: [phone]  Weekend/holiday: [phone]  Office: [phone]

## 2021-01-17 NOTE — PROGRESS NOTES
Assessment and Plan:   YVONNE: HRS. Labs worse with rising Cr. K 5.4 and Na 121. HCO3 15. FENa < 0.1 and Luis Carlos <5. Wt stable last 3 days. No UO measured.   On IV alb, will increase to q 8h. Cont midodrine. Start IVF with bicarb. Add back octreotide. Nutrition consult for Low K and Low prot tube feeding formula.   Prognosis is poor. Not a dialysis candidate.             Interval History:   Liver failure: cirrhosis, ascites.  Check ammonia level in am.  Anemia and low plt count.   Alcoholism:         SBP: on rocephin.                  Review of Systems:   Somnolent.           Medications:       albumin human  25 g Intravenous Daily     folic acid  1 mg Oral Daily     insulin aspart  1-6 Units Subcutaneous Q4H     lactulose  20 g Oral or Feeding Tube 4x Daily     midodrine  10 mg Oral TID w/meals     multivitamin w/minerals  1 tablet Oral Daily     pantoprazole  40 mg Oral QAM AC     piperacillin-tazobactam  2.25 g Intravenous Q6H     rifaximin  550 mg Oral BID     sodium chloride (PF)  3 mL Intracatheter Q8H     sodium chloride (PF)  3 mL Intracatheter Q8H     thiamine  100 mg Oral Daily       dextrose       Current active medications and PTA medications reviewed, see medication list for details.            Physical Exam:   Vitals were reviewed  Patient Vitals for the past 24 hrs:   BP Temp Temp src Pulse Resp SpO2 Weight   21 0816 119/59 99  F (37.2  C) Oral 90 18 100 % --   21 0423 -- -- -- -- -- -- 91.3 kg (201 lb 3.2 oz)   21 0031 130/58 98.2  F (36.8  C) Oral 89 18 99 % --   21 1612 118/58 97  F (36.1  C) Oral 79 18 100 % --       Temp:  [97  F (36.1  C)-99  F (37.2  C)] 99  F (37.2  C)  Pulse:  [79-90] 90  Resp:  [18] 18  BP: (118-130)/(58-59) 119/59  SpO2:  [99 %-100 %] 100 %    Temperatures:  Current - Temp: 99  F (37.2  C); Max - Temp  Av.1  F (36.7  C)  Min: 97  F (36.1  C)  Max: 99  F (37.2  C)  Respiration range: Resp  Av  Min: 18  Max: 18  Pulse range: Pulse  Av   Min: 79  Max: 90  Blood pressure range: Systolic (24hrs), Av , Min:118 , Max:130   ; Diastolic (24hrs), Av, Min:58, Max:59    Pulse oximetry range: SpO2  Av.7 %  Min: 99 %  Max: 100 %    I/O last 3 completed shifts:  In:  [P.O.:1100; NG/GT:160]  Out: -       Intake/Output Summary (Last 24 hours) at 2021 1127  Last data filed at 2021 0607  Gross per 24 hour   Intake 934 ml   Output --   Net 934 ml       Somnolent, icteric  Lungs with clear BS  Cor RRR nl S1 S2 no M  Abd distended, + fluid wave, mildly tender  LE 1+ edema       Wt Readings from Last 4 Encounters:   21 91.3 kg (201 lb 3.2 oz)   19 91.2 kg (201 lb)   19 91.2 kg (201 lb)   19 90.8 kg (200 lb 3.2 oz)          Data:          Lab Results   Component Value Date     2021     2021     01/15/2021    Lab Results   Component Value Date    CHLORIDE 96 2021    CHLORIDE 97 2021    CHLORIDE 96 01/15/2021    Lab Results   Component Value Date     2021    BUN 90 2021    BUN 83 01/15/2021      Lab Results   Component Value Date    POTASSIUM 5.4 2021    POTASSIUM 4.1 2021    POTASSIUM 4.1 01/15/2021    Lab Results   Component Value Date    CO2 15 2021    CO2 19 2021    CO2 18 01/15/2021    Lab Results   Component Value Date    CR 2.36 2021    CR 2.03 2021    CR 1.92 01/15/2021        Recent Labs   Lab Test 21  0621 21  0645 01/15/21  1028   WBC 28.8* 24.3* 22.3*   HGB 9.2* 9.0* 9.1*   HCT 25.5* 25.5* 26.3*   * 108* 110*   PLT 53* 60* 64*     Recent Labs   Lab Test 01/15/21  1028 21  0809 21  0739 21  0655 21  0655 21  1142 21  1544 21  1544   * 121* 132*   < > 117*  --    < > Canceled, Test credited   ALT 78* 72* 81*   < > 56  --    < > 42   GGT  --   --   --   --   --   --   --  255*   ALKPHOS 113 112 116   < > 174*  --    < > 180*   BILITOTAL 23.1* 22.9*  24.9*   < > 19.6*  --    < > 21.8*   ARLETTE  --   --   --   --  43 82*  --  25    < > = values in this interval not displayed.       Recent Labs   Lab Test 01/16/21  0645 01/15/21  1028 01/12/21  0638   MAG 3.4* 3.4* 3.4*     Recent Labs   Lab Test 01/16/21  0645 01/15/21  1028 01/14/21  0809   PHOS 3.4 3.2 3.0     Recent Labs   Lab Test 01/17/21  0621 01/16/21  0645 01/15/21  1028   OVIDIO 8.2* 8.5 8.6       Lab Results   Component Value Date    OVIDIO 8.2 (L) 01/17/2021     Lab Results   Component Value Date    WBC 28.8 (H) 01/17/2021    HGB 9.2 (L) 01/17/2021    HCT 25.5 (L) 01/17/2021     (H) 01/17/2021    PLT 53 (L) 01/17/2021     Lab Results   Component Value Date     (L) 01/17/2021    POTASSIUM 5.4 (H) 01/17/2021    CHLORIDE 96 01/17/2021    CO2 15 (L) 01/17/2021     (H) 01/17/2021     Lab Results   Component Value Date     (H) 01/17/2021    CR 2.36 (H) 01/17/2021     Lab Results   Component Value Date    MAG 3.4 (H) 01/16/2021     Lab Results   Component Value Date    PHOS 3.4 01/16/2021       Creatinine   Date Value Ref Range Status   01/17/2021 2.36 (H) 0.66 - 1.25 mg/dL Final     Comment:     Reviewed, acceptable   01/16/2021 2.03 (H) 0.66 - 1.25 mg/dL Final   01/15/2021 1.92 (H) 0.66 - 1.25 mg/dL Final     Comment:     Results confirmed by repeat test   01/14/2021 1.96 (H) 0.66 - 1.25 mg/dL Final   01/13/2021 1.98 (H) 0.66 - 1.25 mg/dL Final   01/12/2021 2.55 (H) 0.66 - 1.25 mg/dL Final       Attestation:  I have reviewed today's vital signs, notes, medications, labs and imaging.     Michael Castaneda MD

## 2021-01-17 NOTE — PLAN OF CARE
VS stable, denied pain. Paracentesis done today, site leaking, dressing changed x2. Patient somnolent,  open eyes spontaneously. Incontinent of bladder, no BM for michael shift. TF increased to 60 ml/h. Tolerated well. Continue to monitor.

## 2021-01-17 NOTE — PLAN OF CARE
PT session attempted. Therapist unable to wake patient with noxious stimuli to sternum as well as loud voice. Pt not able to respond to questions when asked or follow commands. Per nursing patient has been like this as of the last couple days, was previously walking. Not appropriate to participate in meaningful therapy session today.

## 2021-01-17 NOTE — CONSULTS
NUTRITION BRIEF NOTE +  TF Assess and Order consult: Can we change pt to a low protein and low K tube feeding. He has renal failure.  See previous RD notes for full assessment details    New findings in last 24 hours:    Diet order: 2 gram sodium diet, Boost 10-2. Calorie counts ongoing, <500 kcal and 20 g protein consumed 1/16    FT placed 1/15 and goal rate reached 1/16 (standard formula)    Meds, Labs: reviewed    Electrolytes  Potassium (mmol/L)   Date Value   01/17/2021 5.4 (H)   01/16/2021 4.1   01/15/2021 4.1     Phosphorus (mg/dL)   Date Value   01/16/2021 3.4   01/15/2021 3.2   01/14/2021 3.0   01/12/2021 2.9   01/08/2021 3.5        Blood Glucose  Glucose (mg/dL)   Date Value   01/17/2021 161 (H)   01/16/2021 132 (H)   01/15/2021 126 (H)   01/14/2021 136 (H)   01/13/2021 130 (H)        Inflammatory Markers  WBC (10e9/L)   Date Value   01/17/2021 28.8 (H)   01/16/2021 24.3 (H)   01/15/2021 22.3 (H)     Albumin (g/dL)   Date Value   01/15/2021 3.4   01/14/2021 3.0 (L)   01/13/2021 2.8 (L)        Magnesium (mg/dL)   Date Value   01/16/2021 3.4 (H)   01/15/2021 3.4 (H)   01/12/2021 3.4 (H)     Sodium (mmol/L)   Date Value   01/17/2021 121 (L)   01/16/2021 123 (L)   01/15/2021 122 (L)        Renal  Urea Nitrogen (mg/dL)   Date Value   01/17/2021 102 (H)   01/16/2021 90 (H)   01/15/2021 83 (H)     Creatinine (mg/dL)   Date Value   01/17/2021 2.36 (H)   01/16/2021 2.03 (H)   01/15/2021 1.92 (H)         Additional  Ketones Urine (mg/dL)   Date Value   01/17/2021 Trace (A)            Assessed Nutrition Needs (DW: 87 kg):  Estimated Energy Needs: 9397-2919 kcal (25-30 kcal/kg)   Justification: minimum maintenance  Estimated Protein Needs: + grams protein (1.2+ g per kg)  Justification: preservation of lean body mass and liver disease (per GI) vs hepatorenal syndrome and renal failure  Estimated Fluid Needs: per MD    Interventions:    Diet per MD    Updated TF formula and rate per nephrology request:  Type of  Feeding Tube: ND (by IR)  Enteral Frequency:  Continuous  Enteral Regimen: [place] at 40 mL/hr  Total Enteral Provisions: 960 mL daily provides 1728 kcal, 78 g protein, 1018 mg K+ and 1018 mL free water. Certavite --> nephronex  Free Water Flush: 30 mL q4 hours for tube patency    Please page/consult as needed.      Kita Ramos MS, RDN-AP, LD, CNSC  Pager - 3rd floor/ICU: [phone]  Pager - All other floors: [phone]  Pager - Weekend/holiday: [phone]  Office: [phone]

## 2021-01-17 NOTE — PROGRESS NOTES
GI Staff    Large volume (5L) paracentesis yesterday.  Breathing appears less labored. Obtunded, disoriented. He pulled out his feeding tube.    AF, stable BP  ++++Jaundice  ABD: distended, less so than yesterday.  Neuro: encephalopathic.    Labs reviewed.  Chart notes reviewed.    End Stage Alcoholic Cirrhosis: It is extremely unlikely that he will recover, in fact he may not survive more than a few days.  I recommend a comfort care approach.

## 2021-01-17 NOTE — PLAN OF CARE
Vitals: VSS. Afebrile. Denies pain.   Labs:   Neuro: Oriented x self. Garbled speech. Calm and cooperative.   Respiratory: Lung sounds diminished. No cough or SOB.   Cardiac: Apical pulse regular. Denies chest pain. Edema present to LE, flanks, and generalized.   GI/: Incontinent. NG for tube feedings.   Skin: Bruising present. Puncture site to right abdomen with serous drainage. New dressing applied. Repositioning.   LDAs: PIV to right arm SL   Diet: Tube feedings increased to 90 ml/hr with 30 ml flushes q 4 hrs. 2 g Na   Activity: A2   Plan: Continue with POC.

## 2021-01-17 NOTE — PLAN OF CARE
Somulent and seems unaware of surroundings.  Mumbles occasionally.  I pad placed in room for Heather,Sig. other, to see and talk with him  Did use sling to have him sit in chair for 1 hr  Otherwize turning every 2 hrs with HOB elevated 40 degrees.  Cathed UA sent this am for UA UUC and fractional urine.  Incont. x2 this shift.  Amts seems smaller today.  No stools.  NG pulled out this afternoon.  Dr Hook informed of this and of no stools.  Orders to leave out and continue lactolose
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
## 2021-01-18 NOTE — PROGRESS NOTES
Renal Medicine Progress Note                                Pramod Harris MRN# [id number]   Age: 65 year old YOB: 1955   Date of Admission: 1/3/2021 Hospital LOS: 15                  Assessment/Plan:     Admitted with abdominal pain in setting of alcohol use disorder    Following for elevated creatinine consistent with HRS      1.  Presumed baseline normal renal function   -limited data available for review   2.  Dipstick proteinuria   -PCR 0.31 gm/gm  3.  ARF   -incontinent; oliguric   -Luis Carlos < 5   -limited response to colloid   -suggest HRS   4.  Hyponatremia  5.  Metabolic acidosis  6.  ESLD   -portal hypertension  7.  Hyperbilirubinuria      Continue 25% albumin as ordered  IVF as ordered   Not a dialysis candidate     Extremely poor prognosis  Recommend comfort care      Interval History:     Ill appearing  Lethargic but follows    No UO    Labs reviewed      ROS:     CONSTITUTIONAL:thirsty    Medications and Allergies:     Reviewed    Physical Exam:     Vitals were reviewed  Patient Vitals for the past 8 hrs:   BP Temp Temp src Pulse Resp SpO2   01/18/21 0734 116/56 95.1  F (35.1  C) Axillary 79 18 100 %     No intake/output data recorded.    Vitals:    01/13/21 0430 01/14/21 0642 01/15/21 0311 01/16/21 0657   Weight: 87.4 kg (192 lb 9.6 oz) 92.4 kg (203 lb 11.2 oz) 90.9 kg (200 lb 8 oz) 91.9 kg (202 lb 8 oz)    01/17/21 0423   Weight: 91.3 kg (201 lb 3.2 oz)         GENERAL: awake, alert, follows  HEENT: jaundice  RESP:  clear anteriorly  CV: RRR, normal S1 S2  ABDOMEN: distended  SKIN: jaundice  NEURO: speech normal and cranial nerves 2-12 intact  PSYCH: affect flat  EXT: warm    Data:     Recent Labs   Lab 01/18/21  0730 01/17/21  0621 01/16/21  0645 01/15/21  1028   * 121* 123* 122*   POTASSIUM 3.6 5.4* 4.1 4.1   CHLORIDE 98 96 97 96   CO2 18* 15* 19* 18*   ANIONGAP 9 10 7 8   * 161* 132* 126*   * 102* 90* 83*   CR 2.41* 2.36* 2.03* 1.92*   GFRESTIMATED 27* 28* 33*  36*   GFRESTBLACK 31* 32* 39* 41*   OVIDIO 8.3* 8.2* 8.5 8.6         Recent Labs   Lab 01/18/21  0730 01/17/21  0621 01/16/21  0645 01/15/21  1028 01/14/21  0809 01/13/21  0739 01/12/21  0638   CR 2.41* 2.36* 2.03* 1.92* 1.96* 1.98* 2.55*     Recent Labs   Lab 01/18/21  0730 01/17/21  0621 01/16/21  0645 01/15/21  1028 01/14/21  0809   * 121* 123* 122* 124*     Recent Labs   Lab 01/18/21  0730 01/15/21  1028 01/14/21  0809 01/13/21  0739   ALBUMIN 3.1* 3.4 3.0* 2.8*     Recent Labs   Lab 01/18/21  0730 01/17/21  0621 01/16/21  0645 01/15/21  1028 01/14/21  0809 01/12/21  0638 01/12/21  0638   PHOS  --   --  3.4 3.2 3.0  --  2.9   HGB 8.3* 9.2* 9.0* 9.1* 9.4*   < > 9.2*    < > = values in this interval not displayed.     Recent Labs   Lab 01/17/21  0850   UNAR <5  <5         G Terell Fajardo MD    [facility] - Nephrology  [phone]
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
## 2021-01-18 NOTE — PLAN OF CARE
Occupational Therapy Discharge Summary    Reason for therapy discharge:    Change in medical status.    Progress towards therapy goal(s). See goals on Care Plan in [facility] electronic health record for goal details.  Goals not met.  Barriers to achieving goals:   limited tolerance for therapy.    Therapy recommendation(s):    Will sign off on OT at this time due to patients current medical status. Please reconsult when pt medically stablizied and able to meaningfully participate in OT sessions.

## 2021-01-18 NOTE — PLAN OF CARE
Anxious and restless at beginning of shift, Seroquel PRN given. Patient somnolent, incontinent of bladder, no BM, lactulose given x2. Continue IV fluid, IV ABX and Albumin. Dressing on paracentesis site changed. Continue current POC.

## 2021-01-18 NOTE — PLAN OF CARE
16:55 Text page sent to MD to clarify if blood sugar checks should continue to be q 4 hrs v.s mealtimes/HS?    See new order to discontinue blood sugar checks and insulin orders.  MD stated, no longer necessary.    22:49 No c/o pain.  Pt is confused but talking to himself (and also to family members on I-pad). Turned and repositioned in bed. Lungs coarse, diminished. 99% RA. No tele.  2 gm sodium diet.  Has to be fed.  Family states he likes bananas, milk, banana bread, peaches/pears, tuna salad, peanut butter, egg salad.  He does not like chocolate pudding.  Pt had a snack at bedtime of ice cream, milk, peanut butter and crackers. Incont bowel and bladder.  PIV infusing per orders.  PT, WOC, palliative, nephrology, nutrition following. Continue lactulose, IV abx, albumin per orders.

## 2021-01-18 NOTE — PROGRESS NOTES
"CLINICAL NUTRITION SERVICES - REASSESSMENT NOTE    Recommendations Ordered by Registered Dietitian (RD):     Discontinue TF related orders due to lack of access   Future/Additional Recommendations:     Goals of care: replace FT?    Malnutrition:   % Weight Loss: unable to determine with edema masking loss  % Intake:  </= 50% for >/= 5 days (severe malnutrition)  Subcutaneous Fat Loss: Mild to moderate (orbital, upper and lower arms, thoracic region)  Muscle Loss: Moderate,(temporal, hands, clavicle, acromion, scapular, calf, patellar and upper thigh region)  Fluid Retention: Mild to moderate edema    Malnutrition Diagnosis: Severe malnutrition  In Context of:  Acute illness or injury with underlying Environmental or social circumstances     EVALUATION OF PROGRESS TOWARD GOALS/NEW FINDINGS   Progress towards goals will be monitored and evaluated per protocol and Practice Guidelines    Diet order: 2 gram Na, oral nutrition supplements Boost 10-2  Patient receiving enteral nutrition. Switched to renal formula  at request of nephrology, however patient pulled FT out  prior to transition (unable to bridle - see procedure note)    Total EN volume received: Unable to determine due to incomplete I/O tracking for nutrition support infusion. Initially started on Replete with fiber 1/15, goal  pm.      Per GI: \"End Stage Alcoholic Cirrhosis: It is extremely unlikely that he will recover, in fact he may not survive more than a few days.  I recommend a comfort care approach.\"    Last BM:     Paracentesis 5 L rmoeved    Wounds/WOCN: following for thora site    Skin: jaundiced, sclera yellow, bruising    Fluid: +2-3 dependent, generalized, BLE edema    Weight: 91 kg +/- with fluid shifts     Palliative: following for goals of care    Dagoberto nutrition score: 2; total score: 14    No intake/output data recorded.     Temp (24hrs), Av.4  F (36.3  C), Min:95.2  F (35.1  C), Max:99  F (37.2  C)    Labs:   "   Electrolytes  Potassium (mmol/L)   Date Value   01/17/2021 5.4 (H)   01/16/2021 4.1   01/15/2021 4.1     Phosphorus (mg/dL)   Date Value   01/16/2021 3.4   01/15/2021 3.2   01/14/2021 3.0   01/12/2021 2.9   01/08/2021 3.5    Blood Glucose  Glucose (mg/dL)   Date Value   01/17/2021 161 (H)   01/16/2021 132 (H)   01/15/2021 126 (H)   01/14/2021 136 (H)   01/13/2021 130 (H)    Inflammatory Markers  WBC (10e9/L)   Date Value   01/17/2021 28.8 (H)   01/16/2021 24.3 (H)   01/15/2021 22.3 (H)     Albumin (g/dL)   Date Value   01/15/2021 3.4   01/14/2021 3.0 (L)   01/13/2021 2.8 (L)      Magnesium (mg/dL)   Date Value   01/16/2021 3.4 (H)   01/15/2021 3.4 (H)   01/12/2021 3.4 (H)     Sodium (mmol/L)   Date Value   01/17/2021 121 (L)   01/16/2021 123 (L)   01/15/2021 122 (L)    Renal  Urea Nitrogen (mg/dL)   Date Value   01/17/2021 102 (H)   01/16/2021 90 (H)   01/15/2021 83 (H)     Creatinine (mg/dL)   Date Value   01/17/2021 2.36 (H)   01/16/2021 2.03 (H)   01/15/2021 1.92 (H)     Additional  Ketones Urine (mg/dL)   Date Value   01/17/2021 Trace (A)      Meds:       albumin human  25 g Intravenous Q8H     B and C vitamin Complex with folic acid  5 mL Oral Daily     folic acid  1 mg Oral Daily     insulin aspart  1-6 Units Subcutaneous Q4H     lactulose  20 g Oral or Feeding Tube 4x Daily     midodrine  10 mg Oral TID w/meals     octreotide  100 mcg Intravenous TID     pantoprazole  40 mg Oral QAM AC     piperacillin-tazobactam  2.25 g Intravenous Q6H     rifaximin  550 mg Oral BID     sodium chloride (PF)  3 mL Intracatheter Q8H     sodium chloride (PF)  3 mL Intracatheter Q8H     thiamine  100 mg Oral Daily        dextrose       IV infusion builder WITH additives 100 mL/hr at 01/18/21 0141      ASSESSED NUTRITION NEEDS (PER APPROVED PRACTICE GUIDELINES; DW: 87 kg):  Estimated Energy Needs: 0362-7321 kcal (25-30 kcal/kg)   Justification: minimum maintenance  Estimated Protein Needs: + grams protein (1.2+ g per  kg)  Justification: preservation of lean body mass and liver disease (per GI) vs hepatorenal syndrome and renal failure  Estimated Fluid Needs: per MD    Previous Goals:   Patient to consume >/= 50% of meals TID and >/=2 high protein supplements per day vs initiation of nutrition support   Evaluation: Not met     Previous Nutrition Diagnosis:   Inadequate nutrient intake (protein energy) related to ETOH withdrawal, encephalopathy and ascites as evidenced by intake of <50% of estimated needs for >5 days, ascites masking full degree of weight loss, fat and muscle wasting   Evaluation: Improving with FT placement, updated below     CURRENT NUTRITION DIAGNOSIS  Inadequate nutrient intake (protein energy) related to ETOH withdrawal, encephalopathy and ascites as evidenced by intake of <50% of estimated needs for >5 days, ascites masking full degree of weight loss, fat and muscle wasting     Inadequate enteral infusion related to loss of access as evidenced by patient pulled FT out after 2-3 days of slow infusion rate advancmeent    INTERVENTIONS  Recommendations / Nutrition Prescription  2 gram Na diet per MD, oral nutrition supplements as tolerated  Discontinue TF orders - no plans to replace FT at this time    Implementation  EN Composition, EN Schedule, Multivitamin/Minerals and Feeding Tube Flush: discontinued orders  Collaboration and Referral of care: Discussed patient during interdisciplinary care rounds this morning and with RN    Goals  Goals of care to address nutrition plan of care within 48 hours    MONITORING AND EVALUATION:  Progress towards goals will be monitored and evaluated per protocol and Practice Guidelines      Kita Ramos MS, RDN-AP, LD, CNSC  Pager - 3rd floor/ICU: [phone]  Pager - All other floors: [phone]  Pager - Weekend/holiday: [phone]  Office: [phone]

## 2021-01-18 NOTE — PLAN OF CARE
"Vitals: Temp: 95.2  F (35.1  C) Temp src: Axillary BP: 115/58 Pulse: 80   Resp: 18 SpO2: 100 % O2 Device: None (Room air)    Neuro: Disoriented x4. Unable to answer questions appropriately, or follow commands. Pt would say \"no\", groan, and said \"I dont want you to be my nurse,\" otherwise does not communicate anything else.   Respiratory: LS clr, breathing regular, snoring while sleeping. O2 stable on room air.  Cardiac/tele: WDL  GI/: Incontinent of bowel and bladder.   Skin: jaundice, bruised, generalized edema  LDAs: PIV to right Ivf  Labs: , 152  Diet: 2gm na, did not eat anything this shift.  Activity: A2  Plan: Discharge TBD. Will continue POC.     "

## 2021-01-18 NOTE — PLAN OF CARE
Started out this shift being very sommulent.  Eyes opening briefly when stimulated.  Continue with IVF and attempt po meds and oral fluids later if able.   About 1030 awake and although slow with responses, is appropriately saying 1-2 words to girl friend.  Drinking large amts of ice water and boast drinks.    Fed lunch. Ate a banana and peaches.  Incont.once of urine and twice of stool  Dr Murray informed of girlfriend wanting him to call with POC update

## 2021-01-19 NOTE — PROGRESS NOTES
"[facility]  Palliative Care Progress Note  Text Page    Patient mumbling and confused. In asking where he was he indicated \"[place]\". I phoned the patient's son, Fito at [phone]. He indicated \"the doctor called me yesterday and he was a little more alert.\" I acknowledged seeing Pramod prior to our phone call and in asking where he was Pramod indicated [place]. Fito did not think his father had ever been to [place] and laughed. Fito asked if his father had a second opinion, as we spoke Fito placed Pramod's significant other, Heather Dennis on our phone call. I acknowledged the Hospitalist, Nephrologist and Gastroenterologist who have been involved in Pramod's care during the past week and in brief each of their recommendations. Heather asked if Dr. Hui would be seeing Pramod as she appreciated him. I explained the Hospitalist rotate and although Dr. Hui was involved in Pramod's care January 4, 5, and 6, I would not have knowledge of which Hospitalist would see Pramod in the future. I explained my role is in assuring Pramod has the type of care he would want given his poor prognosis. I explained they do have the option to continue current plan of care, or to consider a plan for comfort. We discussed a comfort focused plan. Heather is quite focused on Pramod's nutrition and would like to speak with our dietitian regarding dietary modifications. We discussed the burden and benefit of tube feeding as Pramod had removed the feeding tube previously and his current platelets had dropped to 40. Heather asked about rPamod's current creatine (which is 2.35) and remarked \"it's better than yesterday, so I know he will get better.\" I asked Heather what she understood about Pramod's current health. She remarked \"it's too early to give up. I was told to give it a month when he came in the hospital\".  Heather needed to take a phone call, but I continued my discussion with Fito. He " "acknowledged \"I know my father had sad he would want to get better. He would want to try. I don't think we can give up yet.\"        Assessment & Plan   1.  Decisional Capacity -  Unreliable. Patient does not have an advance directive. Per  informed consent policy next of kin should be involved in all consent and decision making. The patient has three children Fito, Stuart and Maxine who are next of kin. He has been estranged from Stuart and Maxine, but Fito indicates they have been notified about the patient's hospitalization.      2.  Encephalopathy - Due to end stage liver disease. Continue GI recommendations. Patient does not currently demonstrate medical capacity. Family request restorative care.      3.  Dysphagia - Appreciate input of Speech Therapist Filiberto Paulson, SLP.  Family request restorative care.      4.  Severe malnutrition - Dietitian consulted as significant other would like information on dietary modifications as she is hopeful he will improve.     5.  Abdominal pain - Patient currently denies. Recent paracentesis 1/4/2021 of 5.4 liters;  1/11/2021 of 3.9 liters and 1/16/2021 of 5 liters.      6.  Generalized weakness - Therapy following and TCU recommended due to debilitated state. Doubtful patient will survive this hospitalization, yet family is not ready to have a comfort approach.       7.  Spiritual Care - Appreciate input of  Tyler Weathers MA.      8.  Care Planning - Appreciate input of  DEYVI Craig     Goals of Care: FULL CODE - Restorative care. Son and significant other would like to continue with all cares as significant other states \"it's too early to give up. I was told to give it a month when he came in the hospital\".       Ivory Jack MS, RN, CNS, APRN, ACHPN, FAACVPR  Pain and Palliative Care  Pager [phone]  Office [phone]     Time Spent on this Encounter   Total unit/floor time 10:10 AM until 11:25 AM, time consisted of the following, " examination of the patient, reviewing the record and completing documentation. >50% of time spent in counseling and coordination of care.  Time spend counseling with family consisted of the following topics, goals of care, care planning for discharge and symptom management.  Time spent in coordination of care with Bedside Nurse Lavinia Perez RN, Hospitalist Duy Hook MD and Dietician Kita Ramos, RD, LD.     Interval History   Chart reviewed - poor prognosis    Review of Systems        Unable as patient lethargic and mumbling.    Physical Exam   Temp:  [95.6  F (35.3  C)-96.5  F (35.8  C)] 96.1  F (35.6  C)  Pulse:  [80-92] 81  Resp:  [20-24] 20  BP: (109-129)/(55-63) 112/57  SpO2:  [99 %-100 %] 100 %  204 lbs 0 oz  GEN:  Lethargic jaundiced male, alert, seems to respond to name, but indicated he is [place], appears comfortable, no apparent distress.  HEENT:  Normocephalic/atraumatic, severe scleral icterus, no nasal discharge, mouth dry.  CV:  RRR, S1, S2; no murmurs or other irregularities noted.  +1 DP/PT pulses bilaterally; bilateral leg edema.  RESP:  Clear to auscultation bilaterally without rales/rhonchi/wheezing/retractions.  Symmetric chest rise on inhalation noted.  Normal respiratory effort.  ABD:  Rounded, firm, non-tender/distended.  +Distant bowel sounds  SKIN:  Jaundiced, warm and dry to touch.    Psych:  Calm, minimally conversant and confused    Medications     dextrose       IV infusion builder WITH additives 100 mL/hr at 01/19/21 0430       albumin human  25 g Intravenous Q8H     folic acid  1 mg Oral Daily     lactulose  20 g Oral or Feeding Tube 4x Daily     midodrine  10 mg Oral TID w/meals     octreotide  100 mcg Intravenous TID     pantoprazole  40 mg Oral QAM AC     piperacillin-tazobactam  2.25 g Intravenous Q6H     rifaximin  550 mg Oral BID     sodium chloride (PF)  3 mL Intracatheter Q8H     sodium chloride (PF)  3 mL Intracatheter Q8H     thiamine  100 mg Oral Daily       Data    Results for orders placed or performed during the hospital encounter of 01/03/21 (from the past 24 hour(s))   Glucose by meter   Result Value Ref Range    Glucose 160 (H) 70 - 99 mg/dL   Glucose by meter   Result Value Ref Range    Glucose 135 (H) 70 - 99 mg/dL   Lactic acid level STAT   Result Value Ref Range    Lactate for Sepsis Protocol 1.9 0.7 - 2.0 mmol/L   Comprehensive metabolic panel   Result Value Ref Range    Sodium 124 (L) 133 - 144 mmol/L    Potassium 3.3 (L) 3.4 - 5.3 mmol/L    Chloride 93 (L) 94 - 109 mmol/L    Carbon Dioxide 22 20 - 32 mmol/L    Anion Gap 9 3 - 14 mmol/L    Glucose 146 (H) 70 - 99 mg/dL    Urea Nitrogen 111 (H) 7 - 30 mg/dL    Creatinine 2.35 (H) 0.66 - 1.25 mg/dL    GFR Estimate 28 (L) >60 mL/min/[1.73_m2]    GFR Estimate If Black 32 (L) >60 mL/min/[1.73_m2]    Calcium 8.4 (L) 8.5 - 10.1 mg/dL    Bilirubin Total 21.6 (HH) 0.2 - 1.3 mg/dL    Albumin 3.2 (L) 3.4 - 5.0 g/dL    Protein Total 5.3 (L) 6.8 - 8.8 g/dL    Alkaline Phosphatase 98 40 - 150 U/L    ALT 54 0 - 70 U/L     (H) 0 - 45 U/L   CBC with platelets   Result Value Ref Range    WBC 19.3 (H) 4.0 - 11.0 10e9/L    RBC Count 2.04 (L) 4.4 - 5.9 10e12/L    Hemoglobin 7.9 (L) 13.3 - 17.7 g/dL    Hematocrit 21.7 (L) 40.0 - 53.0 %     (H) 78 - 100 fl    MCH 38.7 (H) 26.5 - 33.0 pg    MCHC 36.4 31.5 - 36.5 g/dL    RDW 13.9 10.0 - 15.0 %    Platelet Count 40 (LL) 150 - 450 10e9/L   INR   Result Value Ref Range    INR 4.60 (H) 0.86 - 1.14

## 2021-01-19 NOTE — PLAN OF CARE
Vitals: Temp: 95.8  F (35.4  C) Temp src: Oral BP: 109/55 Pulse: 85   Resp: 22 SpO2: 100 % O2 Device: None (Room air)    Neuro: Alert to self and place. Forgetful. Garbled speech.  Respiratory: Ls dim. Breathing pattern regular  Cardiac/tele: WDL  GI/: Incontinent of bowel and bladder. Loose stools  Skin: Jaundice, bruised. Dressing on right flank changed twice, yellow drainage.   LDAs: PIV to right Ivf  Labs: Triggered sepsis protocol, lactic 1.9  Diet: 2gm na, did not eat or drink anything this shift  Activity: A2, total cares  Plan: Palliative following. Will continue POC.

## 2021-01-19 NOTE — PROGRESS NOTES
GI Chart Check    No significant changes. Palliative to further address family today regarding goals of care, possibly consider comfort cares per hospitalist note. Agree with plan. Patient with poor prognosis from liver standpoint.     SEVEN Golden  [facility] ([facility])

## 2021-01-19 NOTE — CONSULTS
NUTRITION BRIEF NOTE + PROVIDER CONSULT: Chilo Heather Lopez [phone] would like information on diet restrictions  See RD note 1/18 for full reassessment details    New findings in last 24 hours:    Diet order: 2 gram Na, Boost 10-2    Labs, meds, chart: reviewed     Interventions:    Nutrition education with spouse, Heather on phone: push oral nutrition supplements, 2 gram Na until appetite and intake normalized. K/Phos borderline low or WNL  - restrict only as necessary; protein not restricted for the time being but requires ongoing monitoring; ongoing sodium restriction (inverse serum and dietary sodium trends, although in Pramod's case, hepatorenal over diet impacting ascites, hyponatremia more than diet). No FT replacement as risks outweigh benefits with current INR and platelet level. Spouse concerned about staff not consistently being able or willing to provide feeding assistance - reviewed waxing and waning mentation and criteria for safe oral intake. Heather verbalized appreciation of RD follow up, left contact info if questions arise, added room service with assist for meal ordering guidance (to ensure variety of foods offered).     Collaboration and Referral of care: Discussed patient during interdisciplinary care rounds this morning and palliative care re: consult    Please page/consult as needed.      Kita Ramos MS, RDN-AP, LD, CNSC  Pager - 3rd floor/ICU: [phone]  Pager - All other floors: [phone]  Pager - Weekend/holiday: [phone]  Office: [phone]

## 2021-01-19 NOTE — PROGRESS NOTES
Renal Medicine Progress Note                                Pramod Harris MRN# [id number]   Age: 65 year old YOB: 1955   Date of Admission: 1/3/2021 Hospital LOS: 16                  Assessment/Plan:     Admitted with abdominal pain in setting of alcohol use disorder    Following for elevated creatinine consistent with HRS      1.  Presumed baseline normal renal function   -limited data available for review   2.  Dipstick proteinuria   -PCR 0.31 gm/gm  3.  ARF   -incontinent; oliguric   -Luis Carlos < 5   -limited response to colloid   -suggest HRS   4.  Hyponatremia  5.  Metabolic acidosis  6.  ESLD   -portal hypertension  7.  Hyperbilirubinuria      Continues on 25% albumin/midodrine/octreotide  Will reduce IVF rate    Family wishes full restorative cares  Unfortunately little to offer in this setting    Initiation of dialysis will not change outcome in this setting  Survival rate of HRS started on dialysis is in 10% range at 6 months  without liver transplant    Perhaps he should be evaluated regarding potential liver transplant candidacy       Interval History:     Ill appearing  Lethargic but follows    No UO    Labs reviewed  Renal labs similar    No acute indication for dialysis     ROS:     CONSTITUTIONAL:thirsty    Medications and Allergies:     Reviewed    Physical Exam:     Vitals were reviewed  Patient Vitals for the past 8 hrs:   BP Temp Temp src Pulse Resp SpO2 Weight   01/19/21 1258 123/51 95.6  F (35.3  C) Oral 83 16 100 % --   01/19/21 0804 112/57 96.1  F (35.6  C) Axillary 81 20 100 % --   01/19/21 0629 -- -- -- -- -- -- 92.5 kg (204 lb)     I/O last 3 completed shifts:  In: 4023 [P.O.:2430; I.V.:1593]  Out: 1 [Urine:1]    Vitals:    01/14/21 0642 01/15/21 0311 01/16/21 0657 01/17/21 0423   Weight: 92.4 kg (203 lb 11.2 oz) 90.9 kg (200 lb 8 oz) 91.9 kg (202 lb 8 oz) 91.3 kg (201 lb 3.2 oz)    01/19/21 0629   Weight: 92.5 kg (204 lb)       GENERAL: awake, alert, follows  HEENT:  jaundice  RESP:  clear anteriorly  CV: RRR, normal S1 S2  ABDOMEN: distended  SKIN: jaundice  NEURO: speech normal and cranial nerves 2-12 intact  PSYCH: affect flat  EXT: warm    Data:     Recent Labs   Lab 01/19/21  0701 01/18/21  0730 01/17/21  0621 01/16/21  0645   * 125* 121* 123*   POTASSIUM 3.3* 3.6 5.4* 4.1   CHLORIDE 93* 98 96 97   CO2 22 18* 15* 19*   ANIONGAP 9 9 10 7   * 133* 161* 132*   * 109* 102* 90*   CR 2.35* 2.41* 2.36* 2.03*   GFRESTIMATED 28* 27* 28* 33*   GFRESTBLACK 32* 31* 32* 39*   OVIDIO 8.4* 8.3* 8.2* 8.5         Recent Labs   Lab 01/19/21  0701 01/18/21  0730 01/17/21  0621 01/16/21  0645 01/15/21  1028 01/14/21  0809 01/13/21  0739   CR 2.35* 2.41* 2.36* 2.03* 1.92* 1.96* 1.98*     Recent Labs   Lab 01/19/21  0701 01/18/21  0730 01/17/21  0621 01/16/21  0645 01/15/21  1028   * 125* 121* 123* 122*     Recent Labs   Lab 01/19/21  0701 01/18/21  0730 01/15/21  1028 01/14/21  0809   ALBUMIN 3.2* 3.1* 3.4 3.0*     Recent Labs   Lab 01/19/21  0701 01/18/21  0730 01/17/21  0621 01/16/21  0645 01/15/21  1028 01/14/21  0809   PHOS  --   --   --  3.4 3.2 3.0   HGB 7.9* 8.3* 9.2* 9.0* 9.1* 9.4*     Recent Labs   Lab 01/17/21  0850   UNAR <5  <5         G Terell Fajardo MD    [facility] - Nephrology  [phone]

## 2021-01-19 NOTE — PLAN OF CARE
Pt. Is alert/lethargic, confused. Unable to answer questions appropriately. Will follow directions at times. Incontinent of bowel and bladder. 2 loose BMs this shift. Bed bath given today. Total feed. Fair appetite this shift. IVF infusing at 50 ml/hr. New dressing placed to old paracentesis site. Using lift to transfer. Will continue with POC.
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
## 2021-01-20 NOTE — PROGRESS NOTES
NUTRITION BRIEF NOTE    Received call to office from wife Heather. Spouse inquiring about high dose antioxidant use such as cranberry, blueberry or cherry tart juice concentrate supplementation, as she read this can be helpful for kidney function from sources she found on the Internet.    Concentrates of these items are not available through [facility] kitchen. Also reviewed perceived benefits likely to offer any acute benefit, and also within context of patient's inability to consistently meet kcal and protein needs, pushing these items may further deter intake of solid food options.   Reviewed intake patterns as charted by nursing, ongoing oral nutrition supplements push for kcal/protein. She plans to be onsite as  [facility] relaxes it's visitor policy. Will continue to follow.     Kita Ramos MS, RDN-AP, LD, CNSC  Pager - 3rd floor/ICU: [phone]  Pager - All other floors: [phone]  Pager - Weekend/holiday: [phone]  Office: [phone]

## 2021-01-20 NOTE — PLAN OF CARE
Vitals: Temp: 97.5  F (36.4  C) Temp src: Oral BP: 123/59 Pulse: 84   Resp: 16 SpO2: 100 % O2 Device: None (Room air)    Neuro: Disoriented to time and situation. Rambling and garbled speech. Does not follow directions  Respiratory: LS dim, breathing unlabored  Cardiac/tele: WDL  GI/: Incontinent of bowel and bladder  Skin: Jaundice, bruised. Dressing to right side cdi  LDAs: PIV to right Ivf  Diet: 2gm Na, did not eat this shift  Activity: Total cares  Plan: Discharge TBD. Will continue POC.

## 2021-01-20 NOTE — PLAN OF CARE
"Patient oriented to self and knows he is in the hospital. PIV patent. Receiving IV antibiotics. Paracentesis site remains very weepy. Dressing saturated and changed x1. Patient tolerating q2h turns. Incontinent of bowel and bladder. Crushing some meds and giving with applesauce. Patient having minimal appetite, but excessive thirst. GI, nephrology, palliative and nutrition following. Will continue to monitor and follow plan of care.    /59 (BP Location: Right arm)   Pulse 84   Temp 97.5  F (36.4  C) (Oral)   Resp 16   Ht 1.676 m (5' 6\")   Wt 92.5 kg (204 lb)   SpO2 100%   BMI 32.93 kg/m      Maru Morgan RN on 1/20/2021 at 2:22 PM    "
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
## 2021-01-20 NOTE — PROGRESS NOTES
Renal Medicine Chart Check      Stop IVF  No additional recommendations    Following         Recent Labs   Lab 01/20/21  0626   *   POTASSIUM 3.5   CHLORIDE 93*   CO2 21   ANIONGAP 9   *   *   CR 2.58*   GFRESTIMATED 25*   GFRESTBLACK 29*   OVIDIO 8.2*           EDER Fajardo    [facility]  [phone]

## 2021-01-20 NOTE — PLAN OF CARE
No c/o pain.  Flat affect.Pt is confused but talking to himself (and also to family members on I-pad). Turned and repositioned in bed. Lungs coarse, diminished. 99% RA. No tele.  2 gm sodium diet.  Is fed by staff.  Incont bowel and bladder.  PIV infusing per orders.  PT, WOC, palliative, nephrology, nutrition, GI, spiritual care, following. Continue lactulose, IV abx, albumin per orders. Potassium replaced on day shift.  Dressing right abdomen changed.

## 2021-01-21 NOTE — PROGRESS NOTES
SPIRITUAL HEALTH SERVICES Progress Note  [facility] Med Surg 3    Spiritual Health visit per consult order for end of life care.  Pt is unresponsive. Partner, Izzy, is at the bedside and two sons are present via IPad from [place].  Pt has another son who visited remotely earlier in the day.     Izzy was tearful and shared that she and Pramod have been together for 15 years. She told the story of how they met in [place] and about his close relationship with his sons.  Pramod has one daughter with whom he had been estranged but they reconciled last year.  Izzy knew how much that meant to him.     Pramod identified a a Jehovah Witness and, according to Izzy, prayed frequently for intercession of angels.  Prayer requested and provided. Also presented prayer quilt.    Plan: Will continue to follow while on unit.  Spiritual Health Services remains available for additional emotional/spiritual support.    Tyler Weathers MA  Staff   Pager: [phone]  Phone: [phone]

## 2021-01-21 NOTE — PROGRESS NOTES
"[facility]  Palliative Care Progress Note  Text Page    Met with the patient's significant other, Heather Dennis at bedside who copiously thanked me for informing her of the change in visitor policy when we spoke on Tuesday. \"If it wasn't for you. I wouldn't have been here with him today. Every moment has ment so much to me.\" Heather introduced me to Pramod's sons Fito and Stuart who were joining our discussion via the computer tablet/[employer] meeting. Heather offered her distress regarding the pink frothy secretions coming from Pramod's nose. Heather was distressed by the fetid smell. I appreciate the nursing staff in getting Lavender patches and coffee grounds to help mask the smells. We discussed the physiology of fulminant pulmonary edema. Heather asked to have Pramod suctioned and we discussed his low platelets and risk of bleeding. Fito offered \"Whatever you can do to help him. We know he is going to die soon, but we don't want him to be in pain.\" We discussed options for comfort at length which Heather and his sons agreed. Appreciate assistance of bedside nurse Madison Gelelr RN in symptom management.            Assessment & Plan   1.  Decisional Capacity -  Unable as patient is comatose. Patient does not have an advance directive. Per  informed consent policy next of kin should be involved in all consent and decision making. The patient has three children Fito, Stuart and Maxine who are next of kin. He has been estranged from Stuart and Maxine, but Fito indicates they have been notified about the patient's hospitalization.      2.  Encephalopathy - Due to intraparenchymal hemorrhage and end stage liver disease. Family request comfort focused care.      3.  Dyspnea and/or pain  - As his Creatine Clearance is 26.1 mL/min would avoid the use of Morphine, as he would not likely clear Morphine metabolites. Reasonable to use IV Dilaudid for respiratory rate greater than 20.      4.  Death " rattle - Agree with the use of a Scope patch, but as it would likely not provide symptom relief until tomorrow, would try IV Robinul 0.4 mg every 4 hours for comfort.     5.  Dysphagia/severe malnutrition - Family aware of aspiration risk and request comfort focused eating.      6.  Spiritual Care - Appreciate input of ihsan Weathers MA in assisting the family in preparatory bereavement.      7.  Care Planning - Appreciate input of  DEYVI Craig     Goals of Care: DNR/DNI - Comfort focused care. Patient not likely to survive terminal event to consider hospice dismissal       Ivory Jack MS, RN, CNS, APRN, ACHPN, FAACVPR  Pain and Palliative Care  Pager [phone]  Office [phone]    Time Spent on this Encounter   Total unit/floor time 4:15 PM until 5:05 PM, time consisted of the following, examination of the patient, reviewing the record and completing documentation. >50% of time spent in counseling and coordination of care.  Time spend counseling with family consisted of the following topics, goals of care, education about prognosis and symptom management.  Time spent in coordination of care with Bedside Nurse Madison Geller RN and ihsan Weathers MA.     Interval History   Patient obtunded with head CT indicating intraparenchymal hemorrhage    Palliative Symptom Review - unable as patient is comatose    Physical Exam   Temp:  [95.2  F (35.1  C)-98.5  F (36.9  C)] 96.9  F (36.1  C)  Pulse:  [] 82  Resp:  [20-32] 22  BP: (109-124)/(48-66) 118/51  SpO2:  [92 %-99 %] 95 %  220 lbs 11.2 oz  GEN:  Jaundiced, comatose male, no response to painful stimuli.   HEENT:  Normocephalic/atraumatic, severe scleral icterus, pink frothy nasal discharge, mouth moist.  CV:  RRR, S1, S2; no murmurs or other irregularities noted.  +2 DP/PT pulses bilaterally; edema of arms and legs edema bilaterally  RESP:  Coarse bilaterally. Respirations tachypnic at 26.   ABD:  Rounded, firm, distended with  distant bowel sounds.   SKIN:  Jaundiced, bruising across right upper leg.     Medications     dextrose         folic acid  1 mg Oral Daily     lactulose  20 g Oral or Feeding Tube 4x Daily     midodrine  10 mg Oral TID w/meals     pantoprazole  40 mg Oral QAM AC     piperacillin-tazobactam  2.25 g Intravenous Q6H     rifaximin  550 mg Oral BID     scopolamine  1 patch Transdermal Q72H    And     scopolamine   Transdermal Q8H     sodium chloride (PF)  3 mL Intracatheter Q8H     thiamine  100 mg Oral Daily       Data   Results for orders placed or performed during the hospital encounter of 01/03/21 (from the past 24 hour(s))   Lactic acid level STAT   Result Value Ref Range    Lactate for Sepsis Protocol 2.7 (H) 0.7 - 2.0 mmol/L   Comprehensive metabolic panel   Result Value Ref Range    Sodium 119 (LL) 133 - 144 mmol/L    Potassium 4.4 3.4 - 5.3 mmol/L    Chloride 89 (L) 94 - 109 mmol/L    Carbon Dioxide 16 (L) 20 - 32 mmol/L    Anion Gap 14 3 - 14 mmol/L    Glucose 109 (H) 70 - 99 mg/dL    Urea Nitrogen 120 (H) 7 - 30 mg/dL    Creatinine 3.13 (H) 0.66 - 1.25 mg/dL    GFR Estimate 20 (L) >60 mL/min/[1.73_m2]    GFR Estimate If Black 23 (L) >60 mL/min/[1.73_m2]    Calcium 8.6 8.5 - 10.1 mg/dL    Bilirubin Total 22.1 (HH) 0.2 - 1.3 mg/dL    Albumin 3.6 3.4 - 5.0 g/dL    Protein Total 5.5 (L) 6.8 - 8.8 g/dL    Alkaline Phosphatase 91 40 - 150 U/L    ALT 53 0 - 70 U/L     (H) 0 - 45 U/L   CBC with platelets differential   Result Value Ref Range    WBC 31.3 (H) 4.0 - 11.0 10e9/L    RBC Count 1.87 (L) 4.4 - 5.9 10e12/L    Hemoglobin 7.1 (L) 13.3 - 17.7 g/dL    Hematocrit 20.1 (L) 40.0 - 53.0 %     (H) 78 - 100 fl    MCH 38.0 (H) 26.5 - 33.0 pg    MCHC 35.3 31.5 - 36.5 g/dL    RDW 14.3 10.0 - 15.0 %    Platelet Count 45 (LL) 150 - 450 10e9/L    Diff Method Automated Method     % Neutrophils 93.6 %    % Lymphocytes 2.3 %    % Monocytes 3.1 %    % Eosinophils 0.0 %    % Basophils 0.1 %    % Immature  Granulocytes 0.9 %    Nucleated RBCs 0 0 /100    Absolute Neutrophil 29.3 (H) 1.6 - 8.3 10e9/L    Absolute Lymphocytes 0.7 (L) 0.8 - 5.3 10e9/L    Absolute Monocytes 1.0 0.0 - 1.3 10e9/L    Absolute Eosinophils 0.0 0.0 - 0.7 10e9/L    Absolute Basophils 0.0 0.0 - 0.2 10e9/L    Abs Immature Granulocytes 0.3 0 - 0.4 10e9/L    Absolute Nucleated RBC 0.0    Ammonia   Result Value Ref Range    Ammonia 62 (H) 10 - 50 umol/L   CT Head w/o Contrast   Result Value Ref Range    Radiologist flags Intracranial hemorrhage (AA)     Narrative    CT SCAN OF THE HEAD WITHOUT CONTRAST   1/21/2021 8:26 AM     HISTORY: Mental status change, unknown cause.    TECHNIQUE:  Axial images of the head and coronal reformations without  IV contrast material. Radiation dose for this scan was reduced using  automated exposure control, adjustment of the mA and/or kV according  to patient size, or iterative reconstruction technique.    COMPARISON: 1/31/2021    FINDINGS:   Intracranial contents: There is an acute parenchymal hematoma centered  in the basal ganglia on the right side measuring 3.3 x 5.2 cm. There  is a small amount of surrounding vasogenic edema. This is new compared  to the prior study. There is a small chronic extra-axial subdural  hemorrhage about the left frontal convexity measuring 4.7 mm with no  significant mass effect, stable compared to the prior study. No other  hemorrhage is identified. Midline structures are positioned to the  left of midline approximately 1 mm. The basilar cisterns remain  patent. Patchy low attenuation in the white matter is nonspecific,  likely due to mild to moderate small vessel ischemic disease. No  evidence of mass or recent ischemic infarction.    Visualized orbits/sinuses/mastoids:  The visualized portions of the  sinuses and mastoids appear normal.    Osseous structures/soft tissues:  No fracture or soft tissue swelling.        Impression    IMPRESSION:   1. Acute 5.2 cm parenchymal hematoma  centered in the basal ganglia on  the right side. Location favors hypertension as an etiology. There is  a small amount of associated edema. Midline structures are positioned  to the left of midline approximately 1 mm. Basilar cisterns are  patent.  2. Small chronic subdural hematoma about the left frontal convexity  measuring less than 5 mm with no significant mass effect is stable  compared to the prior study.  3. Mild to moderate small vessel ischemic disease.     [Critical Result: Intracranial hemorrhage]    Finding was identified on 1/21/2021 8:30 AM.     Patient's nurse Raiza was contacted by Dr. Tang on 1/21/2021 8:36 AM  and verbalized understanding of the critical result.     AMANUEL TANG MD   Blood gas arterial with oxyhemoglobin   Result Value Ref Range    pH Arterial 7.47 (H) 7.35 - 7.45 pH    pCO2 Arterial 21 (L) 35 - 45 mm Hg    pO2 Arterial 82 80 - 105 mm Hg    Bicarbonate Arterial 15 (L) 21 - 28 mmol/L    FIO2 5     Oxyhemoglobin Arterial 97 92 - 100 %    Base Deficit Art 7.5 mmol/L

## 2021-01-21 NOTE — PROGRESS NOTES
Sepsis Evaluation Progress Note    I was called to see Pramod Harris due to abnormal vital signs triggering the Sepsis SIRS screening alert. He is known to have an infection.     Physical Exam   Vital Signs:  Temp: 98.5  F (36.9  C) Temp src: Axillary BP: 120/61 Pulse: 102   Resp: 20 SpO2: 97 % O2 Device: None (Room air)       General: chronically ill appearing  Mental Status: a bit sleepy.     Remainder of physical exam is significant for icterus, jaundice, and distended abdomen    Data   Lactic Acid   Date Value Ref Range Status   01/09/2021 2.3 (H) 0.7 - 2.0 mmol/L Final   01/09/2021 3.4 (H) 0.7 - 2.0 mmol/L Final     Lactate for Sepsis Protocol   Date Value Ref Range Status   01/21/2021 2.7 (H) 0.7 - 2.0 mmol/L Final     Comment:     Significant value called to and read back by  HANY RIOS ([id number]) ON 1.21.21 @ 0100 DS     01/19/2021 1.9 0.7 - 2.0 mmol/L Final       Assessment & Plan   NO EVIDENCE OF SEPSIS at this time.  Vital sign, physical exam, and lab findings are likely due to decompensated cirrhosis.   Spontaneous bacterial peritonitis    Disposition: The patient will remain on the current unit. We will continue to monitor this patient closely..  Darrick Rai MD    Sepsis Criteria   Sepsis: 2+ SIRS criteria due to infection  Severe Sepsis: Sepsis AND 1+ new sign of acute organ dysfunction (Note: lactate >2 or acute encephalopathy each qualify as organ dysfunction)  Septic Shock: Sepsis AND hypotension despite volume resuscitation with 30 ml/kg crystalloid or lactate >=4  Note: HYPOTENSION is defined as 2 BP readings measured 3 hrs apart that have a SBP <90, MAP <65, or decrease >40 mmHg, occurring 6 hrs before or after t-zero

## 2021-01-21 NOTE — PROVIDER NOTIFICATION
01/21/21 0100   Critical Test Results/Notification   Critical Lab Result (Lab Name and Value) Lactic  (2.7)   What Time Did The Lab Notify You? 0100   What Provider Did You Notify? Admitting pager   Admitting MD notified of the above results via web base paging.  Danyelle Dalal, AAKASHN, RN  Medical/Telemetry - 3

## 2021-01-21 NOTE — PROGRESS NOTES
An ABG was completed on the pt's right radial @ 0915 on 5L [place] with no complications noted during the procedure.    Sagrario Davidson RT, RT  1/21/2021 09:16 AM

## 2021-01-21 NOTE — PROVIDER NOTIFICATION
DATE:  1/21/2021   TIME OF RECEIPT FROM LAB:  0840  LAB TEST:  NA   LAB VALUE:  119  RESULTS GIVEN WITH READ-BACK TO (PROVIDER):  Dr. Sorto  TIME LAB VALUE REPORTED TO PROVIDER:   0854 MD present at bedside. Aware of na level. Not change at this time. MD waiting to hear back from neurosurgery
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
## 2021-01-21 NOTE — PROGRESS NOTES
Renal Medicine Chart Check      Events of early am noted  Intraparenchymal hemorrhage by CT    Probable terminal event in this setting    No new recommendations  Call with questions           EDER Fajardo    [facility]  [phone]

## 2021-01-21 NOTE — PLAN OF CARE
Vss Sats 93% on RA. Gasps for air with head flat or turn. Oxymask placed as needed but is off right now. Morphine for comfort with some decrease. Sig other at bedside. Pt is now DNR DNI. New band placed. Responsive to touch/pain only. Opens eyes occasionally does not follow commands. Sons are visiting on ipad. Small to moderate nita bloody secretions from nose and mouth yankeur at bedside with frequent suctioning. Sig other requesting deep suction but was edu on the discomfort it would give him and declined. Npo pt is not able to swallow own secretions. Incont. And q2h turn. Copious amt of yellow serous fluid leaking from thoracenthesis site on right abd. Dressing was changed. Pt jaundice.

## 2021-01-21 NOTE — PROGRESS NOTES
Called this am to see patient for lethargy.  Completely obtunded on exam, would not arouse to painful stimuli.     Initiated w/u with [facility] and updated son.     [facility]CT returned with intraparenchymal hemorrhage, discussed with NSG who have to interventions available given location of bleed, elevated INR.     Discussed with son that this will be a terminal event for his father, likely sometime today.  Discussed that intubation and resuscitation would be futile and likely hasten his decline.      He has agreed to DNR/I    Significant Other Heather is on her way in.  Discussed with RN to notify info desk to facilitate her getting up here as soon as possible

## 2021-01-21 NOTE — PROGRESS NOTES
A&OX2. Lethargic, slow to respond, hallucinates. LS expiratory wheezing and coarse, crackles. Had BMX1. Incontinent  of B&B. Ascites to abdomen. +2 edema to BLE. copious yellow drainage noted to R abd site for paracentesis. Dressing changed. Reg diet and total feed. Turn and repo in bed q2 hrs. Had BM X4. Lactulose held for stool greater than 3. Continue monitoring.

## 2021-01-21 NOTE — PROGRESS NOTES
CLINICAL NUTRITION SERVICES - REASSESSMENT NOTE    Future/Additional Recommendations:     Goals of care: resumption of nutrition support (FT placement very high risk, TPN also high risk witih fluid status, compromised renal + liver function)   Malnutrition:   % Weight Loss: unable to determine with edema masking loss  % Intake:  </= 50% for >/= 5 days (severe malnutrition)  Subcutaneous Fat Loss: Mild to moderate, as outlined previously  Muscle Loss: Moderate, as outlined previously  Fluid Retention: Mild edema    Malnutrition Diagnosis: Severe malnutrition  In Context of:  Acute illness or injury with underlying Environmental or social circumstances     EVALUATION OF PROGRESS TOWARD GOALS/NEW FINDINGS   Progress towards goals will be monitored and evaluated per protocol and Practice Guidelines    Diet order: 2 gram Na, oral nutrition supplements Boost 10-2  Mentation impacting oral intake   Wife on site today - see previous notes about ongoing concerns about PO intake, preferred foods.   Of note, intraparenchymal hemorrhage noted, now DNR/I      Last BM:     Wounds/WOCN: following for thora site    Skin: jaundiced, sclera yellow, bruising    Fluid: +2 dependent, generalized, BLE edema    Weight: 100 kg  -- up (if accurate) from admit of ~90 kg    Palliative: following for goals of care    Dagoberto nutrition score: 2; total score: 14  Temp (24hrs), Av  F (36.1  C), Min:95.2  F (35.1  C), Max:98.5  F (36.9  C)    Labs: INR 4.6 (), platelets 31 (21)  Electrolytes  Potassium (mmol/L)   Date Value   2021 3.5   2021 3.3 (L)   2021 3.6     Phosphorus (mg/dL)   Date Value   2021 3.4   01/15/2021 3.2   2021 3.0   2021 2.9   2021 3.5    Blood Glucose  Glucose (mg/dL)   Date Value   2021 129 (H)   2021 146 (H)   2021 133 (H)   2021 161 (H)   2021 132 (H)    Inflammatory Markers  WBC (10e9/L)   Date Value   2021 18.0 (H)   2021  19.3 (H)   01/18/2021 23.2 (H)     Albumin (g/dL)   Date Value   01/20/2021 3.3 (L)   01/19/2021 3.2 (L)   01/18/2021 3.1 (L)      Magnesium (mg/dL)   Date Value   01/16/2021 3.4 (H)   01/15/2021 3.4 (H)   01/12/2021 3.4 (H)     Sodium (mmol/L)   Date Value   01/20/2021 123 (L)   01/19/2021 124 (L)   01/18/2021 125 (L)    Renal  Urea Nitrogen (mg/dL)   Date Value   01/20/2021 110 (H)   01/19/2021 111 (H)   01/18/2021 109 (H)     Creatinine (mg/dL)   Date Value   01/20/2021 2.58 (H)   01/19/2021 2.35 (H)   01/18/2021 2.41 (H)     Additional  Ketones Urine (mg/dL)   Date Value   01/17/2021 Trace (A)      Meds:       albumin human  25 g Intravenous Q8H     folic acid  1 mg Oral Daily     lactulose  20 g Oral or Feeding Tube 4x Daily     midodrine  10 mg Oral TID w/meals     octreotide  100 mcg Intravenous TID     pantoprazole  40 mg Oral QAM AC     piperacillin-tazobactam  2.25 g Intravenous Q6H     rifaximin  550 mg Oral BID     sodium chloride (PF)  3 mL Intracatheter Q8H     thiamine  100 mg Oral Daily      ASSESSED NUTRITION NEEDS (PER APPROVED PRACTICE GUIDELINES; DW: 87 kg):  Estimated Energy Needs: 5355-8231 kcal (25-30 kcal/kg)   Justification: minimum maintenance  Estimated Protein Needs: + grams protein (1.2+ g per kg)  Justification: preservation of lean body mass and liver disease (per GI) vs hepatorenal syndrome and renal failure  Estimated Fluid Needs: per MD    Previous Goals:   Goals of care to address nutrition plan of care within 48 hours  Evaluation: Not met     Previous Nutrition Diagnosis:   Inadequate nutrient intake (protein energy) related to ETOH withdrawal, encephalopathy and ascites as evidenced by intake of <50% of estimated needs for >5 days, ascites masking full degree of weight loss, fat and muscle wasting   Evaluation: Ongoing  Inadequate enteral infusion related to loss of access as evidenced by patient pulled FT out after 2-3 days of slow infusion rate advancmeent  Evaluation: No  longer applicable with lack of access    CURRENT NUTRITION DIAGNOSIS  Inadequate nutrient intake (protein energy) related to ETOH withdrawal, encephalopathy and ascites as evidenced by intake of <50% of estimated needs for >5 days, ascites masking full degree of weight loss, fat and muscle wasting     INTERVENTIONS  Recommendations / Nutrition Prescription  2 gram Na diet per MD, oral nutrition supplements as tolerated  Nutrition support resumption per MD discretion    Implementation  Medical food supplement: Boost BID and prn as tolerated  Collaboration and Referral of care: Discussed patient during interdisciplinary care rounds this morning and Dr. Sorto    Goals  Deferred given updated patient condition today    MONITORING AND EVALUATION:  Progress towards goals will be monitored and evaluated per protocol and Practice Guidelines      Kita Ramos MS, RDN-AP, LD, CNSC  Pager - 3rd floor/ICU: [phone]  Pager - All other floors: [phone]  Pager - Weekend/holiday: [phone]  Office: [phone]

## 2021-01-21 NOTE — PROVIDER NOTIFICATION
Pt responsive to pain only. Lethargic. Audible gurggling sound. vss sats 91% on RA. 4L oxymask applied sats now 100%. Dr. Sorto present at bedside around 0730 this am. STAT CT and labs order. Pt brought to CT on monitoring. Dr. Sorto paged with Results and resource nurse present.

## 2021-01-22 ENCOUNTER — HOSPITAL (OUTPATIENT)
Facility: CLINIC | Age: 66
End: 2021-01-22

## 2021-01-22 ASSESSMENT — ACTIVITIES OF DAILY LIVING (ADL): ADLS_ACUITY_SCORE: 25

## 2021-01-22 NOTE — PLAN OF CARE
Donor Referral line contacted, Pt. Deferred from donation due to infection. Heather James, at bedside at time of death, Family on ipad. Son, Fito Marinelli aware of passing.  Heather removed 2 rings from left hand and was not able to remove ring on right hand. Heather states that she did speak with Son, Fito Marinelli, and they have agreed to do C[facility]. They have decided on [facility] [place] but have not contacted at them this time. They will call in the morning. But also stated that they would also look at other C[facility]ation centers as well. [facility] phone number given to Heather and informed her to contact [facility] once they have decided. All belongings sent with Heather.

## 2021-01-22 NOTE — PROGRESS NOTES
Provider Notified : Pt is unresponsive, it looks like he has . family in the room. Please come and assess pt.

## 2021-01-22 NOTE — PROGRESS NOTES
Dr Rai saw patient and pronounced dead at 0009 1/21/21. Pt washed and put into body bag with 1 ring on R. Hand. Was verbally told by Charge RN that family agreed to not remove ring on R. Hand because of not being physically able to remove it. Tag put on toe and white bag enclosing pt. PIV removed. Sent down to cooler accompanied by staff and security. Heather verbalized taking all of pt's belongings out of room.
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
## 2021-01-22 NOTE — PROGRESS NOTES
Eyes remains open, but not responding to signals. Bites down on oral swab with each mouth swabbing. Biotene every hour to keep his mouth moist. Red, nita and foamy secretion on the nostrils. IV ROBINUL to control secretion. Drainage to right abd at the thoracenteses site. Dressing changed. Incontinent of Bladder. No  Bm during t his shift.  Skin yellow and jaundiced. Turned and repositioned every two hours. Family at bed side. Patient became unresponsive at 2245. MD meredith.
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
## 2021-01-22 NOTE — PROGRESS NOTES
I was paged to see patient to pronounce death. I examined patient and he had no pulse, heart sounds, or respirations. His significant other was present and I answered several questions for her.     Time of death:  23:45    I will defer death summary to Dr. Sorto who has been rounding on patient for the last few days.

## 2021-01-23 LAB
BACTERIA SPEC CULT: NO GROWTH
BACTERIA SPEC CULT: NO GROWTH
BACTERIA SPEC CULT: NORMAL
Lab: NORMAL
SPECIMEN SOURCE: NORMAL

## (undated) DEVICE — KIT ENDO TURNOVER/PROCEDURE W/CLEAN A SCOPE LINERS 103888

## (undated) DEVICE — ENDO SNARE EXACTO COLD 9MM LOOP 2.4MMX230CM 00711115

## (undated) RX ORDER — FENTANYL CITRATE 50 UG/ML
INJECTION, SOLUTION INTRAMUSCULAR; INTRAVENOUS
Status: DISPENSED
Start: 2017-11-06